# Patient Record
Sex: FEMALE | Race: WHITE | Employment: PART TIME | URBAN - METROPOLITAN AREA
[De-identification: names, ages, dates, MRNs, and addresses within clinical notes are randomized per-mention and may not be internally consistent; named-entity substitution may affect disease eponyms.]

---

## 2017-04-26 ENCOUNTER — HOSPITAL ENCOUNTER (OUTPATIENT)
Dept: MAMMOGRAPHY | Age: 62
Discharge: HOME | End: 2017-04-26
Attending: INTERNAL MEDICINE
Payer: MEDICARE

## 2017-04-26 DIAGNOSIS — Z12.31 VISIT FOR SCREENING MAMMOGRAM: ICD-10-CM

## 2017-04-26 PROCEDURE — 77067 SCR MAMMO BI INCL CAD: CPT

## 2018-05-09 ENCOUNTER — HOSPITAL ENCOUNTER (OUTPATIENT)
Dept: MAMMOGRAPHY | Age: 63
Discharge: HOME | End: 2018-05-09
Attending: INTERNAL MEDICINE
Payer: MEDICARE

## 2018-05-09 DIAGNOSIS — Z12.31 ENCOUNTER FOR SCREENING MAMMOGRAM FOR MALIGNANT NEOPLASM OF BREAST: ICD-10-CM

## 2018-05-09 PROCEDURE — 77067 SCR MAMMO BI INCL CAD: CPT

## 2018-08-15 ENCOUNTER — HOSPITAL ENCOUNTER (OUTPATIENT)
Dept: SURGERY | Age: 63
Discharge: HOME | End: 2018-08-15

## 2018-08-16 VITALS — BODY MASS INDEX: 24.25 KG/M2 | HEIGHT: 68 IN | WEIGHT: 160 LBS

## 2018-08-16 NOTE — PERIOP NOTES
Patient verified name, , and procedure. Type: 1a; abbreviated assessment per anesthesia guidelines  Labs per surgeon: none  Labs per anesthesia: poc glucose    Instructed pt that they will be notified via preop for time of arrival to GI lab. Follow diet and prep instructions per Dr Douglas Shetty instructions as follows: You will be on a clear liquid diet the day before your procedure and you may have any of the following clear liquids:   Water.  Strained fruit juices, without pulp, including apple, orange, white grape, or white cranberry.  Limeade or lemonade.  Coffee or tea (do not use any non-dairy creamer.)   Chicken broth.  Gelatin desserts, without added fruit or toppings (no red or purple gelatin.)  You should NOT:   Do NOT drink any milk products or use any milk products in coffee or tea.  Do NOT drink anything with red or purple dye.  Do NOT drink any alcoholic beverages. Bath or shower the night before and the am of surgery with non-moisturizing soap. No lotions, oils, powders, cologne on skin. No make up, eye make up or jewelry. Wear loose fitting comfortable, clean clothing. Must have adult present in building the entire time and MUST bring someone with you or arrange for someone to drive you home after the test.      You may take medications up to 3 hours prior to your procedure with sips of water only. Medications to take norco prn, synthroid, protonix, patient to hold (ASA). Pt will take 80% (24 units) of PM dose of Lantus night before colonoscopy per anesthesia protocols. Pt states she is a fragile diabetic - given direct number to preop and pt instructed to call AM DOS if any concerns re: glucose.     The following discharge instructions reviewed with patient: medication given during procedure may cause drowsiness for several hours, therefore, do not drive or operate machinery for remainder of the day, no alcohol on the day of your procedure, resume regular diet and activity unless otherwise directed, you may experience abdominal distention for several hours that is relieved by the passage of gas. Contact your physician if you have any of the following: fever or chills, severe abdominal pain or excessive amount of bleeding or a large amount when having a bowel movement.  Occasional specks of blood with bowel movement would not be unusual.

## 2018-08-20 ENCOUNTER — ANESTHESIA EVENT (OUTPATIENT)
Dept: ENDOSCOPY | Age: 63
End: 2018-08-20
Payer: MEDICARE

## 2018-08-20 RX ORDER — SODIUM CHLORIDE, SODIUM LACTATE, POTASSIUM CHLORIDE, CALCIUM CHLORIDE 600; 310; 30; 20 MG/100ML; MG/100ML; MG/100ML; MG/100ML
100 INJECTION, SOLUTION INTRAVENOUS CONTINUOUS
Status: CANCELLED | OUTPATIENT
Start: 2018-08-20

## 2018-08-20 RX ORDER — SODIUM CHLORIDE 0.9 % (FLUSH) 0.9 %
5-10 SYRINGE (ML) INJECTION AS NEEDED
Status: CANCELLED | OUTPATIENT
Start: 2018-08-20

## 2018-08-21 ENCOUNTER — HOSPITAL ENCOUNTER (OUTPATIENT)
Age: 63
Setting detail: OUTPATIENT SURGERY
Discharge: HOME | End: 2018-08-21
Attending: SURGERY | Admitting: SURGERY
Payer: MEDICARE

## 2018-08-21 ENCOUNTER — ANESTHESIA (OUTPATIENT)
Dept: ENDOSCOPY | Age: 63
End: 2018-08-21
Payer: MEDICARE

## 2018-08-21 VITALS
OXYGEN SATURATION: 98 % | HEART RATE: 72 BPM | BODY MASS INDEX: 24.24 KG/M2 | WEIGHT: 159.4 LBS | TEMPERATURE: 98.6 F | SYSTOLIC BLOOD PRESSURE: 133 MMHG | RESPIRATION RATE: 16 BRPM | DIASTOLIC BLOOD PRESSURE: 62 MMHG

## 2018-08-21 LAB
GLUCOSE BLD STRIP.AUTO-MCNC: 179 MG/DL (ref 65–100)
GLUCOSE BLD STRIP.AUTO-MCNC: 233 MG/DL (ref 65–100)

## 2018-08-21 PROCEDURE — 76040000025: Performed by: SURGERY

## 2018-08-21 PROCEDURE — 74011250636 HC RX REV CODE- 250/636

## 2018-08-21 PROCEDURE — 74011250636 HC RX REV CODE- 250/636: Performed by: ANESTHESIOLOGY

## 2018-08-21 PROCEDURE — 82962 GLUCOSE BLOOD TEST: CPT

## 2018-08-21 PROCEDURE — 76060000031 HC ANESTHESIA FIRST 0.5 HR: Performed by: SURGERY

## 2018-08-21 RX ORDER — SODIUM CHLORIDE, SODIUM LACTATE, POTASSIUM CHLORIDE, CALCIUM CHLORIDE 600; 310; 30; 20 MG/100ML; MG/100ML; MG/100ML; MG/100ML
100 INJECTION, SOLUTION INTRAVENOUS CONTINUOUS
Status: DISCONTINUED | OUTPATIENT
Start: 2018-08-21 | End: 2018-08-21 | Stop reason: HOSPADM

## 2018-08-21 RX ORDER — PROPOFOL 10 MG/ML
INJECTION, EMULSION INTRAVENOUS
Status: DISCONTINUED | OUTPATIENT
Start: 2018-08-21 | End: 2018-08-21 | Stop reason: HOSPADM

## 2018-08-21 RX ADMIN — PROPOFOL 160 MCG/KG/MIN: 10 INJECTION, EMULSION INTRAVENOUS at 07:35

## 2018-08-21 RX ADMIN — SODIUM CHLORIDE, SODIUM LACTATE, POTASSIUM CHLORIDE, AND CALCIUM CHLORIDE: 600; 310; 30; 20 INJECTION, SOLUTION INTRAVENOUS at 07:32

## 2018-08-21 NOTE — IP AVS SNAPSHOT
Blain Kehr 
 
 
 Formerly [facility], [facility] [address] 
[phone] Patient: Heather Jaffe MRN: [id number] About your hospitalization You were admitted on:  August 21, 2018 You last received care in the:  [facility] PREOP You were discharged on:  August 21, 2018 Why you were hospitalized Your primary diagnosis was:  Not on File Follow-up Information None Your Scheduled Appointments Tuesday August 21, 2018 COLONOSCOPY with Gabino Jerez, DO  
E ENDOSCOPY ([facility]) Formerly [facility], [facility] [address]  
[phone] Discharge Orders None A check adrienne indicates which time of day the medication should be taken. My Medications ASK your doctor about these medications Instructions Each Dose to Equal  
 Morning Noon Evening Bedtime  
 aliskiren 300 mg tablet Commonly known as:  [employer] Your last dose was: Your next dose is: Take 1 Tab by mouth daily. 300 mg  
    
   
   
   
  
 aspirin delayed-release 81 mg tablet Your last dose was: Your next dose is: Take 81 mg by mouth daily. 81 mg  
    
   
   
   
  
 B.infantis-B.ani-B.long-B.bifi 10-15 mg Tbec Commonly known as:  PROBIOTIC 4X Your last dose was: Your next dose is: Take one tablet daily Blood-Glucose Meter Misc Commonly known as:  RELION PRIME METER Your last dose was: Your next dose is:    
   
   
 DX E10.9  NPI [id number] Checks blood sugars seven times daily and prn CALCIUM 600 + D 600-125 mg-unit Tab Generic drug:  calcium-cholecalciferol (d3) Your last dose was: Your next dose is: Take  by mouth daily. flash glucose scanning reader Misc Your last dose was: Your next dose is:    
   
   
 Replace sensor once every ten days Dx E 10.9, E 10.42  
     
   
   
   
  
 glucose blood VI test strips strip Commonly known as:  RELION PRIME TEST STRIPS Your last dose was: Your next dose is:    
   
   
 Checks blood sugars seven times per day and prn. DX E10.9 NPI [id number]  
     
   
   
   
  
 * HYDROcodone-acetaminophen  mg tablet Commonly known as:  Birda [place] Your last dose was: Your next dose is: Take 1 Tab by mouth every six (6) hours as needed for Pain. Max Daily Amount: 4 Tabs. 1 Tab  
    
   
   
   
  
 * HYDROcodone-acetaminophen  mg tablet Commonly known as:  Birda [place] Start taking on:  8/31/2018 Your last dose was: Your next dose is: Take 1 Tab by mouth every six (6) hours as needed. Max Daily Amount: 4 Tabs. 1 Tab  
    
   
   
   
  
 * HYDROcodone-acetaminophen  mg tablet Commonly known as:  Birda [place] Start taking on:  9/30/2018 Your last dose was: Your next dose is: Take 1 Tab by mouth every six (6) hours as needed for Pain. Max Daily Amount: 4 Tabs. 1 Tab  
    
   
   
   
  
 ibuprofen 400 mg tablet Commonly known as:  MOTRIN Your last dose was: Your next dose is: Take 1 Tab by mouth every six (6) hours as needed for Pain. 400 mg  
    
   
   
   
  
 * Insulin Needles (Disposable) 29 gauge x 1/2\" Ndle Commonly known as:  BD ULTRA-FINE ORIG PEN NEEDLE Your last dose was: Your next dose is:    
   
   
 1 Units by Does Not Apply route nightly. Injects q hs  
 1 Units * Insulin Needles (Disposable) 30 gauge x 1/3\" Commonly known as:  NOVOFINE 30 Your last dose was: Your next dose is:    
   
   
 E10.9 NPI [id number] Inject QID * Insulin Needles (Disposable) 29 gauge x 1/2\" Ndle Your last dose was: Your next dose is:    
   
   
 Injects once daily  DX E10.9  [id number]  
     
   
   
   
  
 lancets 23 gauge Misc Commonly known as:  ACCU-CHEK SAFE-T-PRO PLUS Your last dose was: Your next dose is:    
   
   
 1 Lancet by Does Not Apply route Before meals, after meals and at bedtime. Checks BS seven times daily   DX E10.9  NPI [id number] 1 Lancet LANTUS SOLOSTAR U-100 INSULIN 100 unit/mL (3 mL) Inpn Generic drug:  insulin glargine Your last dose was: Your next dose is:    
   
   
 30 Units by SubCUTAneous route nightly. 30 Units  
    
   
   
   
  
 levothyroxine 100 mcg tablet Commonly known as:  SYNTHROID Your last dose was: Your next dose is: Take 1 Tab by mouth Daily (before breakfast). 100 mcg NovoLOG Flexpen U-100 Insulin 100 unit/mL Inpn Generic drug:  insulin aspart U-100 Your last dose was: Your next dose is:    
   
   
 8 Units by SubCUTAneous route Before breakfast, lunch, and dinner. Sliding scale 8 Units  
    
   
   
   
  
 nystatin powder Commonly known as:  NYSTOP Your last dose was: Your next dose is:    
   
   
 Apply  to affected area four (4) times daily. pantoprazole 40 mg tablet Commonly known as:  PROTONIX Your last dose was: Your next dose is: Take 1 Tab by mouth daily. 40 mg  
    
   
   
   
  
 sodium-potassium-mag sulfate 17.5-3.13-1.6 gram Solr oral solution Commonly known as:  SUPREP BOWEL PREP KIT Your last dose was: Your next dose is: Take 177 mL by mouth See Admin Instructions. 177 mL * Notice: This list has 6 medication(s) that are the same as other medications prescribed for you. Read the directions carefully, and ask your doctor or other care provider to review them with you. Opioid Education Prescription Opioids: What You Need to Know: 
 
Prescription opioids can be used to help relieve moderate-to-severe pain and are often prescribed following a surgery or injury, or for certain health conditions. These medications can be an important part of treatment but also come with serious risks. Opioids are strong pain medicines. Examples include hydrocodone, oxycodone, fentanyl, and morphine. Heroin is an example of an illegal opioid. It is important to work with your health care provider to make sure you are getting the safest, most effective care. WHAT ARE THE RISKS AND SIDE EFFECTS OF OPIOID USE? Prescription opioids carry serious risks of addiction and overdose, especially with prolonged use. An opioid overdose, often marked by slow breathing, can cause sudden death. The use of prescription opioids can have a number of side effects as well, even when taken as directed. · Tolerance-meaning you might need to take more of a medication for the same pain relief · Physical dependence-meaning you have symptoms of withdrawal when the medication is stopped. Withdrawal symptoms can include nausea, sweating, chills, diarrhea, stomach cramps, and muscle aches. Withdrawal can last up to several weeks, depending on which drug you took and how long you took it. · Increased sensitivity to pain · Constipation · Nausea, vomiting, and dry mouth · Sleepiness and dizziness · Confusion · Depression · Low levels of testosterone that can result in lower sex drive, energy, and strength · Itching and sweating RISKS ARE GREATER WITH:      
· History of drug misuse, substance use disorder, or overdose · Mental health conditions (such as depression or anxiety) · Sleep apnea · Older age (72 years or older) · Pregnancy Avoid alcohol while taking prescription opioids. Also, unless specifically advised by your health care provider, medications to avoid include: · Benzodiazepines (such as Xanax or Valium) · Muscle relaxants (such as Soma or Flexeril) · Hypnotics (such as Ambien or Lunesta) · Other prescription opioids KNOW YOUR OPTIONS Talk to your health care provider about ways to manage your pain that don't involve prescription opioids. Some of these options may actually work better and have fewer risks and side effects. Options may include: 
· Pain relievers such as acetaminophen, ibuprofen, and naproxen · Some medications that are also used for depression or seizures · Physical therapy and exercise · Counseling to help patients learn how to cope better with triggers of pain and stress. · Application of heat or cold compress · Massage therapy · Relaxation techniques Be Informed Make sure you know the name of your medication, how much and how often to take it, and its potential risks & side effects. IF YOU ARE PRESCRIBED OPIOIDS FOR PAIN: 
· Never take opioids in greater amounts or more often than prescribed. Remember the goal is not to be pain-free but to manage your pain at a tolerable level. · Follow up with your primary care provider to: · Work together to create a plan on how to manage your pain. · Talk about ways to help manage your pain that don't involve prescription opioids. · Talk about any and all concerns and side effects. · Help prevent misuse and abuse. · Never sell or share prescription opioids · Help prevent misuse and abuse. · Store prescription opioids in a secure place and out of reach of others (this may include visitors, children, friends, and family). · Safely dispose of unused/unwanted prescription opioids: Find your community drug take-back program or your pharmacy mail-back program, or flush them down the toilet, following guidance from the Food and Drug Administration (www.fda.gov/Drugs/ResourcesForYou). · Visit www.cdc.gov/drugoverdose to learn about the risks of opioid abuse and overdose. · If you believe you may be struggling with addiction, tell your health care provider and ask for guidance or call Daphne Flanagan at 8-197-378-HELP. Discharge Instructions Gastrointestinal Colonoscopy/Flexible Sigmoidoscopy - Lower Exam Discharge Instructions 1. Call Dr. Shakeel Qureshi at office for any problems or questions. 2. Contact the doctors office for follow up appointment as directed 3. Medication may cause drowsiness for several hours, therefore, do not drive or operate machinery for remainder of the day. 4. No alcohol today. 5. Ordinarily, you may resume regular diet and activity after exam unless otherwise specified by your physician. 6. Because of air put into your colon during exam, you may experience some abdominal distension, relieved by the passage of gas, for several hours. 7. Contact your physician if you have any of the following: 
a. Excessive amount of bleeding  large amount when having a bowel movement. Occasional specks of blood with bowel movement would not be unusual. 
b. Severe abdominal pain 
c. Fever or Chills 8. Polyp Removal  follow these additional instructions 
a. Clear liquid diet for the next meal (jell-o, broth, clear drinks) b. Soft diet for 24 hours, then resume regular diet  
c. Take Metamucil  1 tablespoon in juice every morning for 3 days 
d. No Aspirin, Advil, Aleve, Nuprin, Ibuprofen, or medications that contain these drugs for 2 weeks. Instructions given to Laure Magana and other family members. ACO Transitions of Care Introducing [employer] [address] Jose offers a voluntary care coordination program to provide high quality service and care to [facility] fee-for-service beneficiaries. Miguel Caldera was designed to help you enhance your health and well-being through the following services: ? Transitions of Care  support for individuals who are transitioning from one care setting to another (example: Hospital to home). ? Chronic and Complex Care Coordination  support for individuals and caregivers of those with serious or chronic illnesses or with more than one chronic (ongoing) condition and those who take a number of different medications. If you meet specific medical criteria, a [facility] may call you directly to coordinate your care with your primary care physician and your other care providers. For questions about the [facility] programs, please, contact your physicians office. For general questions or additional information about Accountable Care Organizations: 
Please visit www.medicare.gov/acos. html or call 1-800-MEDICARE (8-155.574.7558) TTY users should call 4-338.239.4425. Introducing \[facility] [place] [facility]\"" & HEALTH SERVICES! Dear Meenakshi Dominguez: Thank you for requesting a [employer] account. Our records indicate that you already have an active [employer] account. You can access your account anytime at [email or web address] Did you know that you can access your hospital and ER discharge instructions at any time in [employer]? You can also review all of your test results from your hospital stay or ER visit. Additional Information If you have questions, please visit the Frequently Asked Questions section of the [employer] website at [email or web address] Remember, [employer] is NOT to be used for urgent needs. For medical emergencies, dial 911. Now available from your iPhone and Android! Introducing Tristan Dennis As a [employer] patient, I wanted to make you aware of our electronic visit tool called Tristan Dennis. [employer] 24/7 allows you to connect within minutes with a medical provider 24 hours a day, seven days a week via a mobile device or tablet or logging into a secure website from your computer. You can access zoomsquare from anywhere in the [place]. A virtual visit might be right for you when you have a simple condition and feel like you just dont want to get out of bed, or cant get away from work for an appointment, when your regular [address] [place] [address] provider is not available (evenings, weekends or holidays), or when youre out of town and need minor care. Electronic visits cost only $49 and if the [address] [place] [address] 24/7 provider determines a prescription is needed to treat your condition, one can be electronically transmitted to a nearby pharmacy*. Please take a moment to enroll today if you have not already done so. The enrollment process is free and takes just a few minutes. To enroll, please download the [employer] [facility] 24/7 kimberly to your tablet or phone, or visit [email or web address] to enroll on your computer. And, as an [address] [place] [address] patient with a [employer] account, the results of your visits will be scanned into your electronic medical record and your primary care provider will be able to view the scanned results. We urge you to continue to see your regular [address] [place] [address] provider for your ongoing medical care. And while your primary care provider may not be the one available when you seek a [employer] virtual visit, the peace of mind you get from getting a real diagnosis real time can be priceless. For more information on [employer]quare, view our Frequently Asked Questions (FAQs) at [email or web address]. Sincerely, 
 
Mer Kolb MD 
Chief Medical Officer [employer] [facility] Garcia *:  certain medications cannot be prescribed via [employer] Providers Seen During Your Hospitalization Provider Specialty Primary office phone Trino Russell, [address] [facility] [phone] Your Primary Care Physician (PCP) Primary Care Physician Office Phone Office Fax Casey Palma [phone] You are allergic to the following Allergen Reactions Buspar (Buspirone) Palpitations Myrbetriq (Mirabegron) Hives Recent Documentation OB Status Smoking Status Hysterectomy Never Smoker Emergency Contacts Name Discharge Info Relation Home Work Mobile Unique Manning DISCHARGE CAREGIVER [3] Other Relative [6] [phone] Patient Belongings The following personal items are in your possession at time of discharge: 
                             
 
  
  
 Please provide this summary of care documentation to your next provider. Signatures-by signing, you are acknowledging that this After Visit Summary has been reviewed with you and you have received a copy. Patient Signature:  ____________________________________________________________ Date:  ____________________________________________________________  
  
[facility] Provider Signature:  ____________________________________________________________ Date:  ____________________________________________________________

## 2018-08-21 NOTE — ANESTHESIA PREPROCEDURE EVALUATION
Anesthetic History   No history of anesthetic complications            Review of Systems / Medical History  Patient summary reviewed and pertinent labs reviewed    Pulmonary  Within defined limits                 Neuro/Psych   Within defined limits           Cardiovascular    Hypertension: well controlled              Exercise tolerance: >4 METS  Comments: Occasional PAC's. Denies CP, SOB, Syncope, or changes in functional status   GI/Hepatic/Renal     GERD           Endo/Other    Diabetes (Brittle IDDM.  Glucose 189 - prone to drop fast per Pt): well controlled, type 1    Cancer (Breast)     Other Findings   Comments: Gastroparesis    Neuropathy           Physical Exam    Airway  Mallampati: II  TM Distance: 4 - 6 cm  Neck ROM: normal range of motion   Mouth opening: Normal     Cardiovascular    Rhythm: regular  Rate: normal         Dental  No notable dental hx       Pulmonary  Breath sounds clear to auscultation               Abdominal  GI exam deferred       Other Findings            Anesthetic Plan    ASA: 3  Anesthesia type: total IV anesthesia          Induction: Intravenous  Anesthetic plan and risks discussed with: Patient

## 2018-08-21 NOTE — DISCHARGE INSTRUCTIONS
Gastrointestinal Colonoscopy/Flexible Sigmoidoscopy - Lower Exam Discharge Instructions  1. Call Dr. Liset Vanessa at office for any problems or questions. 2. Contact the doctors office for follow up appointment as directed  3. Medication may cause drowsiness for several hours, therefore, do not drive or operate machinery for remainder of the day. 4. No alcohol today. 5. Ordinarily, you may resume regular diet and activity after exam unless otherwise specified by your physician. 6. Because of air put into your colon during exam, you may experience some abdominal distension, relieved by the passage of gas, for several hours. 7. Contact your physician if you have any of the following:  a. Excessive amount of bleeding - large amount when having a bowel movement. Occasional specks of blood with bowel movement would not be unusual.  b. Severe abdominal pain  c. Fever or Chills  8. Polyp Removal - follow these additional instructions  a. Clear liquid diet for the next meal (jell-o, broth, clear drinks)  b. Soft diet for 24 hours, then resume regular diet   c. Take Metamucil - 1 tablespoon in juice every morning for 3 days  d. No Aspirin, Advil, Aleve, Nuprin, Ibuprofen, or medications that contain these drugs for 2 weeks. Instructions given to Nhung Alcocerner and other family members.

## 2018-08-21 NOTE — PROCEDURES
Procedure in Detail:  Informed consent was obtained for the procedure. The patient was placed in the left lateral decubitus position and sedation was induced by anesthesia. The [id number] was inserted into the rectum and advanced under direct vision to the cecum, which was identified by the ileocecal valve and appendiceal orifice. The quality of the colonic preparation was excellent. A careful inspection was made as the colonoscope was withdrawn, including a retroflexed view of the rectum; findings and interventions are described below. Appropriate photodocumentation was obtained. Findings:   ANUS: Anal exam reveals no masses or hemorrhoids, sphincter tone is normal.   RECTUM: Rectal exam reveals no masses or hemorrhoids. SIGMOID COLON: The mucosa is normal with good vascular pattern and without ulcers, diverticula, and polyps. DESCENDING COLON: The mucosa is normal with good vascular pattern and without ulcers, diverticula, and polyps. SPLENIC FLEXURE: The splenic flexure is normal.   TRANSVERSE COLON: The mucosa is normal with good vascular pattern and without ulcers, diverticula, and polyps. HEPATIC FLEXURE: The hepatic flexure is normal.   ASCENDING COLON: The mucosa is normal with good vascular pattern and without ulcers, diverticula, and polyps. CECUM: The appendiceal orifice appears normal. The ileocecal valve appears normal.   TERMINAL ILEUM: The terminal ileum was not entered. Specimens: No specimens were collected. Complications: None; patient tolerated the procedure well. \    EBL - none    Recommendations:   - Repeat colonoscopy in 5 years.      Signed By: Maria Victoria Carr DO                        August 21, 2018

## 2018-08-21 NOTE — ANESTHESIA POSTPROCEDURE EVALUATION
Post-Anesthesia Evaluation and Assessment    Patient: Flavia Houser MRN: [id number]  SSN: [id number]    YOB: 1955  Age: 60 y.o. Sex: female       Cardiovascular Function/Vital Signs  Visit Vitals    /62    Pulse 72    Temp 37 °C (98.6 °F)    Resp 16    Wt 72.3 kg (159 lb 6.4 oz)    SpO2 98%    BMI 24.24 kg/m2       Patient is status post total IV anesthesia anesthesia for Procedure(s):  COLONOSCOPY  BMI 25. Nausea/Vomiting: None    Postoperative hydration reviewed and adequate. Pain:  Pain Scale 1: Visual (08/21/18 0808)  Pain Intensity 1: 0 (08/21/18 7823)   Managed    Neurological Status: At baseline    Mental Status and Level of Consciousness: Alert and oriented     Pulmonary Status:   O2 Device: Room air (08/21/18 0813)   Adequate oxygenation and airway patent    Complications related to anesthesia: None    Post-anesthesia assessment completed.  No concerns    Signed By: Irineo Jackson MD     August 21, 2018

## 2018-08-22 LAB — GLUCOSE BLD STRIP.AUTO-MCNC: 175 MG/DL (ref 65–100)

## 2019-05-16 ENCOUNTER — HOSPITAL ENCOUNTER (OUTPATIENT)
Dept: MAMMOGRAPHY | Age: 64
Discharge: HOME | End: 2019-05-16
Attending: INTERNAL MEDICINE
Payer: MEDICARE

## 2019-05-16 DIAGNOSIS — Z12.31 VISIT FOR SCREENING MAMMOGRAM: ICD-10-CM

## 2019-05-16 PROCEDURE — 77063 BREAST TOMOSYNTHESIS BI: CPT

## 2019-08-02 PROBLEM — E13.319 RETINOPATHY DUE TO SECONDARY DIABETES (HCC): Status: ACTIVE | Noted: 2019-08-02

## 2019-08-02 PROBLEM — Z86.39 HISTORY OF GRAVES' DISEASE: Status: ACTIVE | Noted: 2019-08-02

## 2019-09-24 ENCOUNTER — TELEPHONE (OUTPATIENT)
Dept: DIABETES SERVICES | Age: 64
End: 2019-09-24

## 2019-09-24 NOTE — TELEPHONE ENCOUNTER
Called and left message to schedule Dexcom G6  Continuous Glucose Monitor start. Provided phone number for call back.

## 2019-09-24 NOTE — TELEPHONE ENCOUNTER
Patient called back and wants appointment mid October per patient request for Dexcom G6 Continuous Glucose Monitor start.

## 2019-10-14 ENCOUNTER — HOSPITAL ENCOUNTER (OUTPATIENT)
Dept: DIABETES SERVICES | Age: 64
Discharge: HOME | End: 2019-10-14
Attending: PHYSICIAN ASSISTANT
Payer: MEDICARE

## 2019-10-14 DIAGNOSIS — E10.40 TYPE 1 DIABETES MELLITUS WITH DIABETIC NEUROPATHY (HCC): ICD-10-CM

## 2019-10-14 PROCEDURE — 95249 CONT GLUC MNTR PT PROV EQP: CPT

## 2019-10-14 NOTE — PROGRESS NOTES
Pt seen today for initial insertion of Dexcom G6 system. Pt instructed on overview of continuous glucose monitoring (CGM) device and use. Instructed on sensor, transmitter and .  used. Instructed in charging procedures. Time and date and transmitter ID verified. Instructed on troubleshooting, alerts, alarms, calibration, communication between sensor and , insertion of sensor and transmitter, starting sensor session, start up calibration, ending sensor session, removing sensor pod and transmitter and site rotation. Reviewed CGM guidelines and restrictions on use including no MRI, CT scans, bug spray and sun tanning lotions. All questions and concerns addressed. Provided Dexcom technical support phone number.

## 2020-01-13 ENCOUNTER — HOME HEALTH ADMISSION (OUTPATIENT)
Dept: HOME HEALTH SERVICES | Facility: HOME HEALTH | Age: 65
End: 2020-01-13
Payer: MEDICARE

## 2020-01-13 PROBLEM — E10.42 DIABETIC POLYNEUROPATHY ASSOCIATED WITH TYPE 1 DIABETES MELLITUS (HCC): Status: ACTIVE | Noted: 2020-01-13

## 2020-01-15 ENCOUNTER — HOME CARE VISIT (OUTPATIENT)
Dept: SCHEDULING | Facility: HOME HEALTH | Age: 65
End: 2020-01-15
Payer: MEDICARE

## 2020-01-15 VITALS
HEART RATE: 66 BPM | RESPIRATION RATE: 18 BRPM | TEMPERATURE: 98 F | DIASTOLIC BLOOD PRESSURE: 86 MMHG | SYSTOLIC BLOOD PRESSURE: 162 MMHG

## 2020-01-15 PROCEDURE — 400013 HH SOC

## 2020-01-15 PROCEDURE — 3331090002 HH PPS REVENUE DEBIT

## 2020-01-15 PROCEDURE — G0151 HHCP-SERV OF PT,EA 15 MIN: HCPCS

## 2020-01-15 PROCEDURE — 3331090001 HH PPS REVENUE CREDIT

## 2020-01-16 PROCEDURE — 3331090001 HH PPS REVENUE CREDIT

## 2020-01-16 PROCEDURE — 3331090002 HH PPS REVENUE DEBIT

## 2020-01-17 PROCEDURE — 3331090001 HH PPS REVENUE CREDIT

## 2020-01-17 PROCEDURE — 3331090002 HH PPS REVENUE DEBIT

## 2020-01-18 PROCEDURE — 3331090001 HH PPS REVENUE CREDIT

## 2020-01-18 PROCEDURE — 3331090002 HH PPS REVENUE DEBIT

## 2020-01-19 PROCEDURE — 3331090001 HH PPS REVENUE CREDIT

## 2020-01-19 PROCEDURE — 3331090002 HH PPS REVENUE DEBIT

## 2020-01-20 ENCOUNTER — HOME CARE VISIT (OUTPATIENT)
Dept: HOME HEALTH SERVICES | Facility: HOME HEALTH | Age: 65
End: 2020-01-20
Payer: MEDICARE

## 2020-01-20 ENCOUNTER — HOME CARE VISIT (OUTPATIENT)
Dept: SCHEDULING | Facility: HOME HEALTH | Age: 65
End: 2020-01-20
Payer: MEDICARE

## 2020-01-20 VITALS
SYSTOLIC BLOOD PRESSURE: 120 MMHG | HEART RATE: 72 BPM | TEMPERATURE: 98.1 F | DIASTOLIC BLOOD PRESSURE: 78 MMHG | RESPIRATION RATE: 17 BRPM

## 2020-01-20 PROCEDURE — G0157 HHC PT ASSISTANT EA 15: HCPCS

## 2020-01-20 PROCEDURE — 3331090001 HH PPS REVENUE CREDIT

## 2020-01-20 PROCEDURE — 3331090002 HH PPS REVENUE DEBIT

## 2020-01-21 PROCEDURE — 3331090001 HH PPS REVENUE CREDIT

## 2020-01-21 PROCEDURE — 3331090002 HH PPS REVENUE DEBIT

## 2020-01-22 ENCOUNTER — HOME CARE VISIT (OUTPATIENT)
Dept: SCHEDULING | Facility: HOME HEALTH | Age: 65
End: 2020-01-22
Payer: MEDICARE

## 2020-01-22 PROCEDURE — 3331090002 HH PPS REVENUE DEBIT

## 2020-01-22 PROCEDURE — G0155 HHCP-SVS OF CSW,EA 15 MIN: HCPCS

## 2020-01-22 PROCEDURE — 3331090001 HH PPS REVENUE CREDIT

## 2020-01-23 ENCOUNTER — HOME CARE VISIT (OUTPATIENT)
Dept: SCHEDULING | Facility: HOME HEALTH | Age: 65
End: 2020-01-23
Payer: MEDICARE

## 2020-01-23 VITALS
RESPIRATION RATE: 20 BRPM | SYSTOLIC BLOOD PRESSURE: 112 MMHG | HEART RATE: 68 BPM | DIASTOLIC BLOOD PRESSURE: 68 MMHG | TEMPERATURE: 98.4 F

## 2020-01-23 PROCEDURE — G0157 HHC PT ASSISTANT EA 15: HCPCS

## 2020-01-23 PROCEDURE — 3331090001 HH PPS REVENUE CREDIT

## 2020-01-23 PROCEDURE — 3331090002 HH PPS REVENUE DEBIT

## 2020-01-24 PROCEDURE — 3331090002 HH PPS REVENUE DEBIT

## 2020-01-24 PROCEDURE — 3331090001 HH PPS REVENUE CREDIT

## 2020-01-25 PROCEDURE — 3331090002 HH PPS REVENUE DEBIT

## 2020-01-25 PROCEDURE — 3331090001 HH PPS REVENUE CREDIT

## 2020-01-26 PROCEDURE — 3331090001 HH PPS REVENUE CREDIT

## 2020-01-26 PROCEDURE — 3331090002 HH PPS REVENUE DEBIT

## 2020-01-27 ENCOUNTER — HOME CARE VISIT (OUTPATIENT)
Dept: SCHEDULING | Facility: HOME HEALTH | Age: 65
End: 2020-01-27
Payer: MEDICARE

## 2020-01-27 VITALS
DIASTOLIC BLOOD PRESSURE: 66 MMHG | TEMPERATURE: 97.3 F | HEART RATE: 72 BPM | SYSTOLIC BLOOD PRESSURE: 144 MMHG | RESPIRATION RATE: 20 BRPM

## 2020-01-27 PROCEDURE — 3331090002 HH PPS REVENUE DEBIT

## 2020-01-27 PROCEDURE — G0157 HHC PT ASSISTANT EA 15: HCPCS

## 2020-01-27 PROCEDURE — 3331090001 HH PPS REVENUE CREDIT

## 2020-01-28 PROCEDURE — 3331090002 HH PPS REVENUE DEBIT

## 2020-01-28 PROCEDURE — 3331090001 HH PPS REVENUE CREDIT

## 2020-01-29 PROCEDURE — 3331090002 HH PPS REVENUE DEBIT

## 2020-01-29 PROCEDURE — 3331090001 HH PPS REVENUE CREDIT

## 2020-01-30 ENCOUNTER — HOME CARE VISIT (OUTPATIENT)
Dept: SCHEDULING | Facility: HOME HEALTH | Age: 65
End: 2020-01-30
Payer: MEDICARE

## 2020-01-30 VITALS
SYSTOLIC BLOOD PRESSURE: 130 MMHG | HEART RATE: 88 BPM | TEMPERATURE: 98.4 F | DIASTOLIC BLOOD PRESSURE: 60 MMHG | RESPIRATION RATE: 19 BRPM

## 2020-01-30 PROCEDURE — 3331090001 HH PPS REVENUE CREDIT

## 2020-01-30 PROCEDURE — 3331090002 HH PPS REVENUE DEBIT

## 2020-01-30 PROCEDURE — G0157 HHC PT ASSISTANT EA 15: HCPCS

## 2020-01-31 PROCEDURE — 3331090001 HH PPS REVENUE CREDIT

## 2020-01-31 PROCEDURE — 3331090002 HH PPS REVENUE DEBIT

## 2020-02-01 PROCEDURE — 3331090001 HH PPS REVENUE CREDIT

## 2020-02-01 PROCEDURE — 3331090002 HH PPS REVENUE DEBIT

## 2020-02-02 PROCEDURE — 3331090001 HH PPS REVENUE CREDIT

## 2020-02-02 PROCEDURE — 3331090002 HH PPS REVENUE DEBIT

## 2020-02-03 ENCOUNTER — HOME CARE VISIT (OUTPATIENT)
Dept: SCHEDULING | Facility: HOME HEALTH | Age: 65
End: 2020-02-03
Payer: MEDICARE

## 2020-02-03 VITALS
TEMPERATURE: 98.1 F | HEART RATE: 80 BPM | RESPIRATION RATE: 19 BRPM | DIASTOLIC BLOOD PRESSURE: 60 MMHG | SYSTOLIC BLOOD PRESSURE: 142 MMHG

## 2020-02-03 PROCEDURE — G0157 HHC PT ASSISTANT EA 15: HCPCS

## 2020-02-03 PROCEDURE — 3331090002 HH PPS REVENUE DEBIT

## 2020-02-03 PROCEDURE — 3331090001 HH PPS REVENUE CREDIT

## 2020-02-04 ENCOUNTER — HOME CARE VISIT (OUTPATIENT)
Dept: HOME HEALTH SERVICES | Facility: HOME HEALTH | Age: 65
End: 2020-02-04
Payer: MEDICARE

## 2020-02-04 PROCEDURE — 3331090002 HH PPS REVENUE DEBIT

## 2020-02-04 PROCEDURE — 3331090001 HH PPS REVENUE CREDIT

## 2020-02-05 PROCEDURE — 3331090001 HH PPS REVENUE CREDIT

## 2020-02-05 PROCEDURE — 3331090002 HH PPS REVENUE DEBIT

## 2020-02-06 PROCEDURE — 3331090001 HH PPS REVENUE CREDIT

## 2020-02-06 PROCEDURE — 3331090002 HH PPS REVENUE DEBIT

## 2020-02-07 ENCOUNTER — HOME CARE VISIT (OUTPATIENT)
Dept: SCHEDULING | Facility: HOME HEALTH | Age: 65
End: 2020-02-07
Payer: MEDICARE

## 2020-02-07 VITALS
TEMPERATURE: 98 F | DIASTOLIC BLOOD PRESSURE: 52 MMHG | HEART RATE: 84 BPM | RESPIRATION RATE: 18 BRPM | SYSTOLIC BLOOD PRESSURE: 108 MMHG

## 2020-02-07 PROCEDURE — 3331090002 HH PPS REVENUE DEBIT

## 2020-02-07 PROCEDURE — G0157 HHC PT ASSISTANT EA 15: HCPCS

## 2020-02-07 PROCEDURE — 3331090001 HH PPS REVENUE CREDIT

## 2020-02-08 PROCEDURE — 3331090002 HH PPS REVENUE DEBIT

## 2020-02-08 PROCEDURE — 3331090001 HH PPS REVENUE CREDIT

## 2020-02-09 PROCEDURE — 3331090001 HH PPS REVENUE CREDIT

## 2020-02-09 PROCEDURE — 3331090002 HH PPS REVENUE DEBIT

## 2020-02-10 ENCOUNTER — HOME CARE VISIT (OUTPATIENT)
Dept: SCHEDULING | Facility: HOME HEALTH | Age: 65
End: 2020-02-10
Payer: MEDICARE

## 2020-02-10 VITALS
DIASTOLIC BLOOD PRESSURE: 68 MMHG | HEART RATE: 78 BPM | RESPIRATION RATE: 18 BRPM | SYSTOLIC BLOOD PRESSURE: 110 MMHG | TEMPERATURE: 97.8 F

## 2020-02-10 PROCEDURE — 3331090001 HH PPS REVENUE CREDIT

## 2020-02-10 PROCEDURE — 3331090002 HH PPS REVENUE DEBIT

## 2020-02-10 PROCEDURE — 3331090003 HH PPS REVENUE ADJ

## 2020-02-10 PROCEDURE — G0151 HHCP-SERV OF PT,EA 15 MIN: HCPCS

## 2020-07-14 ENCOUNTER — HOSPITAL ENCOUNTER (OUTPATIENT)
Dept: MAMMOGRAPHY | Age: 65
Discharge: HOME | End: 2020-07-14
Attending: INTERNAL MEDICINE
Payer: MEDICARE

## 2020-07-14 DIAGNOSIS — Z12.31 SCREENING MAMMOGRAM, ENCOUNTER FOR: ICD-10-CM

## 2020-07-14 PROCEDURE — 77063 BREAST TOMOSYNTHESIS BI: CPT

## 2020-10-07 PROBLEM — E11.21 TYPE 2 DIABETES WITH NEPHROPATHY (HCC): Status: ACTIVE | Noted: 2020-10-07

## 2021-05-24 ENCOUNTER — TRANSCRIBE ORDER (OUTPATIENT)
Dept: SCHEDULING | Age: 66
End: 2021-05-24

## 2021-05-24 DIAGNOSIS — Z12.31 VISIT FOR SCREENING MAMMOGRAM: Primary | ICD-10-CM

## 2021-07-15 ENCOUNTER — HOSPITAL ENCOUNTER (OUTPATIENT)
Dept: MAMMOGRAPHY | Age: 66
Discharge: HOME | End: 2021-07-15
Attending: INTERNAL MEDICINE
Payer: MEDICARE

## 2021-07-15 DIAGNOSIS — Z12.31 VISIT FOR SCREENING MAMMOGRAM: ICD-10-CM

## 2021-07-15 PROCEDURE — 77063 BREAST TOMOSYNTHESIS BI: CPT

## 2022-03-01 ENCOUNTER — TRANSCRIBE ORDER (OUTPATIENT)
Dept: SCHEDULING | Age: 67
End: 2022-03-01

## 2022-03-01 DIAGNOSIS — Z12.31 VISIT FOR SCREENING MAMMOGRAM: Primary | ICD-10-CM

## 2022-03-01 DIAGNOSIS — Z12.31 SCREENING MAMMOGRAM FOR HIGH-RISK PATIENT: Primary | ICD-10-CM

## 2022-03-18 PROBLEM — E13.319 RETINOPATHY DUE TO SECONDARY DIABETES (HCC): Status: ACTIVE | Noted: 2019-08-02

## 2022-03-19 PROBLEM — E11.21 TYPE 2 DIABETES WITH NEPHROPATHY (HCC): Status: ACTIVE | Noted: 2020-10-07

## 2022-03-19 PROBLEM — Z86.39 HISTORY OF GRAVES' DISEASE: Status: ACTIVE | Noted: 2019-08-02

## 2022-03-20 PROBLEM — E10.42 DIABETIC POLYNEUROPATHY ASSOCIATED WITH TYPE 1 DIABETES MELLITUS (HCC): Status: ACTIVE | Noted: 2020-01-13

## 2022-06-22 ENCOUNTER — NURSE ONLY (OUTPATIENT)
Dept: INTERNAL MEDICINE CLINIC | Facility: CLINIC | Age: 67
End: 2022-06-22

## 2022-06-22 DIAGNOSIS — I10 ESSENTIAL HYPERTENSION: Primary | ICD-10-CM

## 2022-06-22 DIAGNOSIS — E11.21 TYPE 2 DIABETES WITH NEPHROPATHY (HCC): ICD-10-CM

## 2022-06-22 DIAGNOSIS — K21.9 GASTROESOPHAGEAL REFLUX DISEASE WITHOUT ESOPHAGITIS: ICD-10-CM

## 2022-06-24 LAB
ALBUMIN SERPL-MCNC: 3.5 G/DL (ref 3.2–4.6)
ALBUMIN/GLOB SERPL: 0.9 {RATIO} (ref 1.2–3.5)
ALP SERPL-CCNC: 82 U/L (ref 50–136)
ALT SERPL-CCNC: 21 U/L (ref 12–65)
ANION GAP SERPL CALC-SCNC: 9 MMOL/L (ref 7–16)
AST SERPL-CCNC: 17 U/L (ref 15–37)
BASOPHILS # BLD: 0.1 K/UL (ref 0–0.2)
BASOPHILS NFR BLD: 1 % (ref 0–2)
BILIRUB SERPL-MCNC: 0.2 MG/DL (ref 0.2–1.1)
BUN SERPL-MCNC: 15 MG/DL (ref 8–23)
CALCIUM SERPL-MCNC: 9.4 MG/DL (ref 8.3–10.4)
CHLORIDE SERPL-SCNC: 107 MMOL/L (ref 98–107)
CO2 SERPL-SCNC: 25 MMOL/L (ref 21–32)
CREAT SERPL-MCNC: 1.2 MG/DL (ref 0.6–1)
DIFFERENTIAL METHOD BLD: ABNORMAL
EOSINOPHIL # BLD: 0.2 K/UL (ref 0–0.8)
EOSINOPHIL NFR BLD: 3 % (ref 0.5–7.8)
ERYTHROCYTE [DISTWIDTH] IN BLOOD BY AUTOMATED COUNT: 14.7 % (ref 11.9–14.6)
GLOBULIN SER CALC-MCNC: 3.8 G/DL (ref 2.3–3.5)
GLUCOSE SERPL-MCNC: 92 MG/DL (ref 65–100)
HCT VFR BLD AUTO: 50.5 % (ref 35.8–46.3)
HGB BLD-MCNC: 14.6 G/DL (ref 11.7–15.4)
IMM GRANULOCYTES # BLD AUTO: 0 K/UL (ref 0–0.5)
IMM GRANULOCYTES NFR BLD AUTO: 0 % (ref 0–5)
LYMPHOCYTES # BLD: 3.9 K/UL (ref 0.5–4.6)
LYMPHOCYTES NFR BLD: 46 % (ref 13–44)
MCH RBC QN AUTO: 30.8 PG (ref 26.1–32.9)
MCHC RBC AUTO-ENTMCNC: 28.9 G/DL (ref 31.4–35)
MCV RBC AUTO: 106.5 FL (ref 79.6–97.8)
MONOCYTES # BLD: 0.4 K/UL (ref 0.1–1.3)
MONOCYTES NFR BLD: 5 % (ref 4–12)
NEUTS SEG # BLD: 3.7 K/UL (ref 1.7–8.2)
NEUTS SEG NFR BLD: 45 % (ref 43–78)
NRBC # BLD: 0 K/UL (ref 0–0.2)
PLATELET # BLD AUTO: 338 K/UL (ref 150–450)
PMV BLD AUTO: 10.8 FL (ref 9.4–12.3)
POTASSIUM SERPL-SCNC: 5.8 MMOL/L (ref 3.5–5.1)
PROT SERPL-MCNC: 7.3 G/DL (ref 6.3–8.2)
RBC # BLD AUTO: 4.74 M/UL (ref 4.05–5.2)
SODIUM SERPL-SCNC: 141 MMOL/L (ref 136–145)
TSH, 3RD GENERATION: 4.38 UIU/ML (ref 0.36–3.74)
WBC # BLD AUTO: 8.3 K/UL (ref 4.3–11.1)

## 2022-06-25 LAB
EST. AVERAGE GLUCOSE BLD GHB EST-MCNC: 131 MG/DL
HBA1C MFR BLD: 6.2 % (ref 4.2–6.3)

## 2022-06-29 ENCOUNTER — OFFICE VISIT (OUTPATIENT)
Dept: INTERNAL MEDICINE CLINIC | Facility: CLINIC | Age: 67
End: 2022-06-29
Payer: MEDICARE

## 2022-06-29 VITALS
DIASTOLIC BLOOD PRESSURE: 62 MMHG | HEART RATE: 96 BPM | HEIGHT: 68 IN | OXYGEN SATURATION: 96 % | SYSTOLIC BLOOD PRESSURE: 130 MMHG | WEIGHT: 166 LBS | BODY MASS INDEX: 25.16 KG/M2

## 2022-06-29 DIAGNOSIS — E10.42 DIABETIC POLYNEUROPATHY ASSOCIATED WITH TYPE 1 DIABETES MELLITUS (HCC): ICD-10-CM

## 2022-06-29 DIAGNOSIS — N18.31 STAGE 3A CHRONIC KIDNEY DISEASE (HCC): ICD-10-CM

## 2022-06-29 DIAGNOSIS — R26.89 BALANCE PROBLEM: ICD-10-CM

## 2022-06-29 DIAGNOSIS — R30.0 DYSURIA: Primary | ICD-10-CM

## 2022-06-29 DIAGNOSIS — E10.42 TYPE 1 DIABETES MELLITUS WITH DIABETIC POLYNEUROPATHY (HCC): ICD-10-CM

## 2022-06-29 DIAGNOSIS — Z00.00 MEDICARE ANNUAL WELLNESS VISIT, SUBSEQUENT: ICD-10-CM

## 2022-06-29 DIAGNOSIS — Z91.81 RISK FOR FALLS: ICD-10-CM

## 2022-06-29 PROBLEM — N18.30 CHRONIC RENAL DISEASE, STAGE III (HCC): Status: ACTIVE | Noted: 2022-06-29

## 2022-06-29 LAB
BACTERIA URINE, POC: NEGATIVE
BILIRUBIN, URINE, POC: NEGATIVE
BLOOD URINE, POC: NEGATIVE
CASTS URINE, POC: NORMAL
EPI CELLS URINE, POC: NORMAL
GLUCOSE URINE, POC: NEGATIVE
KETONES, URINE, POC: NEGATIVE
LEUKOCYTE ESTERASE, URINE, POC: NEGATIVE
NITRITE, URINE, POC: NEGATIVE
PH, URINE, POC: 7 (ref 4.6–8)
PROTEIN,URINE, POC: NORMAL
RBC, URINE, POC: NORMAL
SPECIFIC GRAVITY, URINE, POC: 1.01 (ref 1–1.03)
TRICHOMONAS URINE, POC: NORMAL
URINALYSIS CLARITY, POC: CLEAR
URINALYSIS COLOR, POC: YELLOW
UROBILINOGEN, POC: NORMAL
WBC, URINE, POC: NORMAL
YEAST, URINE, POC: NORMAL

## 2022-06-29 PROCEDURE — 99214 OFFICE O/P EST MOD 30 MIN: CPT | Performed by: INTERNAL MEDICINE

## 2022-06-29 PROCEDURE — 1123F ACP DISCUSS/DSCN MKR DOCD: CPT | Performed by: INTERNAL MEDICINE

## 2022-06-29 PROCEDURE — G0439 PPPS, SUBSEQ VISIT: HCPCS | Performed by: INTERNAL MEDICINE

## 2022-06-29 PROCEDURE — 3044F HG A1C LEVEL LT 7.0%: CPT | Performed by: INTERNAL MEDICINE

## 2022-06-29 PROCEDURE — 81000 URINALYSIS NONAUTO W/SCOPE: CPT | Performed by: INTERNAL MEDICINE

## 2022-06-29 RX ORDER — HYDROCODONE BITARTRATE AND ACETAMINOPHEN 10; 325 MG/1; MG/1
TABLET ORAL
Qty: 120 TABLET | Refills: 0 | Status: SHIPPED | OUTPATIENT
Start: 2022-06-29 | End: 2022-07-29

## 2022-06-29 RX ORDER — HYDROCODONE BITARTRATE AND ACETAMINOPHEN 10; 325 MG/1; MG/1
TABLET ORAL
COMMUNITY
Start: 2022-06-06 | End: 2022-06-29 | Stop reason: SDUPTHER

## 2022-06-29 RX ORDER — HYDROCODONE BITARTRATE AND ACETAMINOPHEN 10; 325 MG/1; MG/1
1 TABLET ORAL EVERY 6 HOURS PRN
Qty: 120 TABLET | Refills: 0 | Status: SHIPPED | OUTPATIENT
Start: 2022-08-29 | End: 2022-09-28

## 2022-06-29 RX ORDER — PEN NEEDLE, DIABETIC 31 GX5/16"
NEEDLE, DISPOSABLE MISCELLANEOUS
COMMUNITY
Start: 2022-06-02

## 2022-06-29 RX ORDER — KETOCONAZOLE 20 MG/ML
SHAMPOO TOPICAL
COMMUNITY
Start: 2022-05-12

## 2022-06-29 RX ORDER — OLOPATADINE HCL 0.2 %
DROPS OPHTHALMIC (EYE)
COMMUNITY
Start: 2022-05-10

## 2022-06-29 RX ORDER — PEN NEEDLE, DIABETIC 31 GX5/16"
NEEDLE, DISPOSABLE MISCELLANEOUS
COMMUNITY
Start: 2022-03-29 | End: 2022-06-29 | Stop reason: SDUPTHER

## 2022-06-29 RX ORDER — HYDROCODONE BITARTRATE AND ACETAMINOPHEN 10; 325 MG/1; MG/1
1 TABLET ORAL EVERY 6 HOURS PRN
Qty: 120 TABLET | Refills: 0 | Status: SHIPPED | OUTPATIENT
Start: 2022-07-29 | End: 2022-08-28

## 2022-06-29 ASSESSMENT — PATIENT HEALTH QUESTIONNAIRE - PHQ9
SUM OF ALL RESPONSES TO PHQ QUESTIONS 1-9: 7
2. FEELING DOWN, DEPRESSED OR HOPELESS: 0
9. THOUGHTS THAT YOU WOULD BE BETTER OFF DEAD, OR OF HURTING YOURSELF: 0
6. FEELING BAD ABOUT YOURSELF - OR THAT YOU ARE A FAILURE OR HAVE LET YOURSELF OR YOUR FAMILY DOWN: 0
SUM OF ALL RESPONSES TO PHQ QUESTIONS 1-9: 7
5. POOR APPETITE OR OVEREATING: 0
SUM OF ALL RESPONSES TO PHQ QUESTIONS 1-9: 7
8. MOVING OR SPEAKING SO SLOWLY THAT OTHER PEOPLE COULD HAVE NOTICED. OR THE OPPOSITE, BEING SO FIGETY OR RESTLESS THAT YOU HAVE BEEN MOVING AROUND A LOT MORE THAN USUAL: 0
SUM OF ALL RESPONSES TO PHQ QUESTIONS 1-9: 7
SUM OF ALL RESPONSES TO PHQ9 QUESTIONS 1 & 2: 1
3. TROUBLE FALLING OR STAYING ASLEEP: 3
7. TROUBLE CONCENTRATING ON THINGS, SUCH AS READING THE NEWSPAPER OR WATCHING TELEVISION: 0
4. FEELING TIRED OR HAVING LITTLE ENERGY: 3
1. LITTLE INTEREST OR PLEASURE IN DOING THINGS: 1
10. IF YOU CHECKED OFF ANY PROBLEMS, HOW DIFFICULT HAVE THESE PROBLEMS MADE IT FOR YOU TO DO YOUR WORK, TAKE CARE OF THINGS AT HOME, OR GET ALONG WITH OTHER PEOPLE: 0

## 2022-06-29 ASSESSMENT — LIFESTYLE VARIABLES
HOW OFTEN DO YOU HAVE A DRINK CONTAINING ALCOHOL: MONTHLY OR LESS
HOW MANY STANDARD DRINKS CONTAINING ALCOHOL DO YOU HAVE ON A TYPICAL DAY: 1 OR 2

## 2022-06-29 NOTE — PROGRESS NOTES
Iona Mckeon was seen today for follow-up, diabetes, medicare awv and dysuria. Diagnoses and all orders for this visit:    Dysuria  -     AMB POC URINALYSIS DIP STICK MANUAL W/ MICRO BSSC  -     Culture, Urine    Stage 3a chronic kidney disease ([facility]    Diabetic polyneuropathy associated with type 1 diabetes mellitus (HCC)  -     HYDROcodone-acetaminophen (NORCO)  MG per tablet; TAKE 1 TABLET BY MOUTH EVERY 6 HOURS AS NEEDED FOR PAIN (MAX DAILY AMOUNT 4 TABLETS)  -     HYDROcodone-acetaminophen (NORCO)  MG per tablet; Take 1 tablet by mouth every 6 hours as needed for Pain for up to 30 days. Max amount 4 tabs per day Intended supply: 30 days  -     HYDROcodone-acetaminophen (NORCO)  MG per tablet; Take 1 tablet by mouth every 6 hours as needed for Pain for up to 30 days.  Max 4 tabs per day Intended supply: 30 days    Type 1 diabetes mellitus with diabetic polyneuropathy ([facility] [address]    Balance problem  -     [address] for falls  -     [address] [facility] Medicare annual wellness visit, subsequent        Russell Salguero is a 78 y.o. female    Chief Complaint   Patient presents with    Follow-up     3 month check    Diabetes     DM foot exam for shoes    Medicare AWV    Dysuria     Lab Results   Component Value Date    LABA1C 6.2 06/24/2022    LABA1C 6.6 (H) 12/21/2021    LABA1C 5.7 (H) 01/15/2021     Lab Results   Component Value Date    LDLCALC 149 (H) 12/21/2021    CREATININE 1.20 (H) 06/24/2022       Good control t1 dm  Low abd discomfort 7 d   Feels like uti  Results for orders placed or performed in visit on 06/29/22   Culture, Urine    Specimen: URINE-CLEAN CATCH   Result Value Ref Range    Special Requests NO SPECIAL REQUESTS      Culture >100,000 COLONIES/mL ENTEROCOCCUS FAECALIS GROUP D (A)         Susceptibility    Enterococcus faecalis group d - BACTERIAL SUSCEPTIBILITY PANEL CASEY     Penicillin G 4 Sensitive ug/mL     vancomycin 2 Sensitive ug/mL     ampicillin 1 Sensitive ug/mL     nitrofurantoin <=16 Sensitive ug/mL   AMB POC URINALYSIS DIP STICK MANUAL W/ MICRO BSSC   Result Value Ref Range    Color (UA POC) Yellow     Clarity (UA POC) Clear     Glucose, Urine, POC Negative Negative    Bilirubin, Urine, POC Negative Negative    Ketones, Urine, POC Negative Negative    Specific Gravity, Urine, POC 1.010 1.001 - 1.035    Blood (UA POC) Negative Negative    pH, Urine, POC 7.0 4.6 - 8.0    Protein, Urine, POC Trace Negative    Urobilinogen, POC 0.2 mg/dL     Nitrite, Urine, POC Negative Negative    Leukocyte Esterase, Urine, POC Negative Negative    RBC, Urine, POC      WBC, Urine, POC      Epi Cells Urine, POC      Bacteria Urine, POC Negative Negative    Casts Urine, POC      Yeast, Urine, POC      Trichomonas Urine, POC         Past Medical History:   Diagnosis Date    Benign paroxysmal positional vertigo     Breast cancer ([facility] 12/2010    Cancer ([facility] 2010    lt breast    Cancer ([facility] 1988    colon    Chronic pain     Chronic renal disease, stage III [facility]) [[id number]] 6/29/2022    Diabetic polyneuropathy associated with type 1 diabetes mellitus ([facility] 1/13/2020    Gastroparesis     GERD (gastroesophageal reflux disease)     medicaton controlled    Glaucoma     Hiatal hernia     HLD (hyperlipidemia)     Hypertension     medication controlled    Neuropathy, diabetic (HCC)     Osteoporosis, unspecified     Personal history of malignant neoplasm of breast     Retinopathy due to secondary diabetes ([facility]     Thyroid disease     medication controlled    Type 1 diabetes ([facility] 5years of age    insulin, Hemoglobin A1c 6.0 on 7/25/18.      Unspecified hereditary and idiopathic peripheral neuropathy     Unspecified hypothyroidism        Family History   Problem Relation Age of Onset    Heart Disease Father     Diabetes Father         Type II    Heart Attack Father 48    Atrial Fibrillation Mother     No Known Problems Sister     Thyroid Disease Sister         multinodular goiter    Gout Brother     Colon Cancer Paternal Grandmother     Breast Cancer Mother         Social History     Socioeconomic History    Marital status:      Spouse name: Not on file    Number of children: Not on file    Years of education: Not on file    Highest education level: Not on file   Occupational History    Not on file   Tobacco Use    Smoking status: Never    Smokeless tobacco: Never   Substance and Sexual Activity    Alcohol use: No    Drug use: Yes     Types: OTC, Prescription    Sexual activity: Not on file   Other Topics Concern    Not on file   Social History Narrative    Not on file     Social Determinants of Health     Financial Resource Strain: Not on file   Food Insecurity: Not on file   Transportation Needs: Not on file   Physical Activity: Inactive    Days of Exercise per Week: 0 days    Minutes of Exercise per Session: 0 min   Stress: Not on file   Social Connections: Not on file   Intimate Partner Violence: Not on file   Housing Stability: Not on file         Current Outpatient Medications:     Contact [employer] [facility], Take 1 tablet by mouth 3 times daily, Disp: , Rfl:     HYDROcodone-acetaminophen (NORCO)  MG per tablet, Take 1 tablet by mouth every 6 hours as needed for Pain for up to 30 days. Max 4 tabs per day Intended supply: 30 days, Disp: 120 tablet, Rfl: 0    HUMALOG KWIKPEN 100 UNIT/ML SOPN, Inject into the skin 2 units before meals plus correction scale 2/50>200, max daily dose 30 units, Disp: 30 mL, Rfl: 3    Methylcobalamin (METHYL B-12 PO), Take 1,000 mcg by mouth daily. , Disp: , Rfl:     B-D ULTRAFINE III SHORT PEN 31G X 8 MM MISC, USE 1 PEN NEEDLE TO INJECT INSULIN FIVE TIMES DAILY, Disp: , Rfl:     ketoconazole (NIZORAL) 2 % shampoo, USE SHAMPOO TO WASH SCALP 2-3 TIMES A WEEK, LET SIT 15 MINUTES PRIOR TO RINSING, Disp: , Rfl:     hydrocortisone 2.5 % cream, APPLY TOPICALLY TO FACE TWICE DAILY AS NEEDED FOR ITCHING, Disp: , Rfl:     PATADAY 0.2 % SOLN ophthalmic solution, INSTILL 1 DROP EVERY DAY INTO BOTH EYES, Disp: , Rfl:     TURMERIC PO, Take 700 mg by mouth daily, Disp: , Rfl:     Alpha-Lipoic Acid 600 MG CAPS, Take 1 tablet by mouth 2 times daily, Disp: , Rfl:     Calcium Carbonate-Vitamin D (CALCIUM-VITAMIN D) 600-125 MG-UNIT TABS, Take 1 tablet by mouth 2 times daily, Disp: , Rfl:     Carboxymethylcellulose Sodium 0.25 % SOLN, Apply 1 drop to eye daily as needed (glaucoma), Disp: , Rfl:     Glucagon (Venora Fluke 2-PACK) 1 MG/0.2ML SOAJ, Inject 1 mg into the skin as needed, Disp: , Rfl:     insulin aspart (NOVOLOG FLEXPEN) 100 UNIT/ML injection pen, Inject into the skin 2 units before meals plus correction scale 2/50>200, max daily dose 30 units, Disp: , Rfl:     insulin glargine (LANTUS SOLOSTAR) 100 UNIT/ML injection pen, Inject 30 Units into the skin nightly, Disp: , Rfl:     levothyroxine (SYNTHROID) 112 MCG tablet, Take 112 mcg by mouth every morning (before breakfast), Disp: , Rfl:     loratadine (CLARITIN) 10 MG tablet, Take 10 mg by mouth daily as needed, Disp: , Rfl:     losartan (COZAAR) 100 MG tablet, Take 100 mg by mouth daily, Disp: , Rfl:     Lutein-Zeaxanthin 20-1 MG CAPS, Take 25 mg by mouth daily, Disp: , Rfl:     nystatin (MYCOSTATIN) 165911 UNIT/GM powder, Apply topically 4 times daily, Disp: , Rfl:     ondansetron (ZOFRAN-ODT) 8 MG TBDP disintegrating tablet, Take 8 mg by mouth every 8 hours as needed for Nausea or Vomiting, Disp: , Rfl:     pantoprazole (PROTONIX) 40 MG tablet, Take 40 mg by mouth daily, Disp: , Rfl:     pilocarpine (PILOCAR) 1 % ophthalmic solution, INSTILL 1 DROP INTO THE LEFT EYE TWICE DAILY, Disp: , Rfl:     senna-docusate (PERICOLACE) 8.6-50 MG per tablet, Take 1 tablet by mouth daily as needed, Disp: , Rfl:     Travoprost, BAK Free, (TRAVATAN Z) 0.004 % SOLN ophthalmic solution, Place 1 drop into both eyes nightly, Disp: , Rfl:     Allergies   Allergen Reactions    Lisinopril Other (See Comments)    Mirabegron Hives    Buspirone Palpitations         Review of Systems   Constitutional:  Positive for fatigue. Genitourinary:  Positive for dysuria. Neurological:  Positive for numbness. Vitals:    06/29/22 1052 06/29/22 1124   BP: (!) 144/60 130/62   Pulse: 96    SpO2: 96%    Weight: 166 lb (75.3 kg)    Height: 5' 8\" (1.727 m)            Physical Exam  Cardiovascular:      Pulses:           Dorsalis pedis pulses are 2+ on the right side and 2+ on the left side. Musculoskeletal:        Legs:         Feet:    Feet:      Right foot:      Skin integrity: Callus present. Toenail Condition: Right toenails are abnormally thick. Left foot:      Skin integrity: Callus present. Toenail Condition: Left toenails are abnormally thick. Comments: Pre ulcerative callous deformity         Leslye Cruz was seen today for follow-up, diabetes, medicare awv and dysuria. Diagnoses and all orders for this visit:    Dysuria  -     AMB POC URINALYSIS DIP STICK MANUAL W/ MICRO BSSC  -     Culture, Urine    Stage 3a chronic kidney disease ([facility] [address]    Diabetic polyneuropathy associated with type 1 diabetes mellitus (HCC)  -     HYDROcodone-acetaminophen (NORCO)  MG per tablet; TAKE 1 TABLET BY MOUTH EVERY 6 HOURS AS NEEDED FOR PAIN (MAX DAILY AMOUNT 4 TABLETS)  -     HYDROcodone-acetaminophen (NORCO)  MG per tablet; Take 1 tablet by mouth every 6 hours as needed for Pain for up to 30 days. Max amount 4 tabs per day Intended supply: 30 days  -     HYDROcodone-acetaminophen (NORCO)  MG per tablet; Take 1 tablet by mouth every 6 hours as needed for Pain for up to 30 days.  Max 4 tabs per day Intended supply: 30 days    Type 1 diabetes mellitus with diabetic polyneuropathy ([facility] [address]    Balance problem  -     [address] for falls  -     [facility] annual wellness visit, subsequent               [facility] Carrier, DO    Medicare Annual Wellness Visit    Chaka Glez is here for Follow-up (3 month check), Diabetes (DM foot exam for shoes), Medicare AWV, and Dysuria    Assessment & Plan   Dysuria  -     AMB POC URINALYSIS DIP STICK MANUAL W/ MICRO BSSC  -     Culture, Urine  Stage 3a chronic kidney disease (HCC)  Diabetic polyneuropathy associated with type 1 diabetes mellitus (HCC)  -     HYDROcodone-acetaminophen (NORCO)  MG per tablet; TAKE 1 TABLET BY MOUTH EVERY 6 HOURS AS NEEDED FOR PAIN (MAX DAILY AMOUNT 4 TABLETS), Disp-120 tablet, R-0Normal  -     HYDROcodone-acetaminophen (NORCO)  MG per tablet; Take 1 tablet by mouth every 6 hours as needed for Pain for up to 30 days. Max amount 4 tabs per day Intended supply: 30 days, Disp-120 tablet, R-0Normal  -     HYDROcodone-acetaminophen (NORCO)  MG per tablet; Take 1 tablet by mouth every 6 hours as needed for Pain for up to 30 days. Max 4 tabs per day Intended supply: 30 days, Disp-120 tablet, R-0Normal  Type 1 diabetes mellitus with diabetic polyneuropathy ([facility] [address]  Balance problem  -     [address] for falls  -     [address] [facility] Medicare annual wellness visit, subsequent      Recommendations for Preventive Services Due: see orders and patient instructions/AVS.  Recommended screening schedule for the next 5-10 years is provided to the patient in written form: see Patient Instructions/AVS.     Return in 3 months (on 9/29/2022) for Medicare Annual Wellness Visit in 1 year. Subjective   The following acute and/or chronic problems were also addressed today:  Dysuria diabets newuropathy etc.    Patient's complete Health Risk Assessment and screening values have been reviewed and are found in Flowsheets. The following problems were reviewed today and where indicated follow up appointments were made and/or referrals ordered.     Positive Risk Factor Screenings with Interventions:    Fall Risk:  Do you feel unsteady or are you worried about falling? : (!) yes (walks with cane)  2 or more falls in past year?: no  Fall with injury in past year?: no     Fall Risk Interventions:    home health        Depression Interventions:  Patient advised to follow-up in this office for further evaluation and treatment within 1 week      Drug Use Screening:    DAST-10 Score Interpretation:  1-2: Low level - Monitor, re-assess at a later date; 3-5: Moderate level - Further Investigation; 6-8: Substantial level - Intensive Assessment; 9-10: Severe level - Intensive Assessment    Substance Use - Drug Use Interventions:  none         Opioid Risk: (Low risk score <55) Opioid risk score: 8    Patient is low risk for opioid use disorder or overdose.   Last PDMP [place] as Reviewed:  Review User Review Instant Review Result             General Health and ACP:  General  In general, how would you say your health is?: Fair  In the past 7 days, have you experienced any of the following: New or Increased Pain, New or Increased Fatigue, Loneliness, Social Isolation, Stress or Anger?: (!) Yes  Select all that apply: (!) New or Increased Fatigue, New or Increased Pain  Do you get the social and emotional support that you need?: Yes  Do you have a Living Will?: Yes    Advance Directives       Power of  Living Will ACP-Advance Directive ACP-Power of     Not on File Not on File Not on File Not on File        General Health Risk Interventions:  Pain issues: home exercises provided    Health Habits/Nutrition:  Physical Activity: Inactive    Days of Exercise per Week: 0 days    Minutes of Exercise per Session: 0 min     Have you lost any weight without trying in the past 3 months?: No  Body mass index: (!) 25.24     Health Habits/Nutrition Interventions:  ok     Safety:  Do you have working smoke detectors?: Yes  Do you have any tripping hazards - loose or unsecured carpets or rugs?: No  Do you have any tripping hazards - clutter in doorways, halls, or stairs?: No  Do you have either shower bars, grab bars, non-slip mats or non-slip surfaces in your shower or bathtub?: Yes  Do all of your stairways have a railing or banister?: (!) No  Do you always fasten your seatbelt when you are in a car?: Yes    Safety Interventions:  Home safety tips provided           Objective   Vitals:    06/29/22 1052 06/29/22 1124   BP: (!) 144/60 130/62   Pulse: 96    SpO2: 96%    Weight: 166 lb (75.3 kg)    Height: 5' 8\" (1.727 m)       Body mass index is 25.24 kg/m². General Appearance: alert and oriented to person, place and time, well-developed and well-nourished, in no acute distress       Allergies   Allergen Reactions    Lisinopril Other (See Comments)    Mirabegron Hives    Buspirone Palpitations     Prior to Visit Medications    Medication Sig Taking? Authorizing Provider   Contact [employer] [facility] MISC Take 1 tablet by mouth 3 times daily Yes Historical Provider, MD   HYDROcodone-acetaminophen (NORCO)  MG per tablet Take 1 tablet by mouth every 6 hours as needed for Pain for up to 30 days. Max 4 tabs per day Intended supply: 30 days Yes Douglas Eckert, DO   HUMALOG KWIKPEN 100 UNIT/ML SOPN Inject into the skin 2 units before meals plus correction scale 2/50>200, max daily dose 30 units  Елена Valdez PA-C   Methylcobalamin (METHYL B-12 PO) Take 1,000 mcg by mouth daily.   Historical Provider, MD STEVENS ULTRAFINE III SHORT PEN 31G X 8 MM MISC USE 1 PEN NEEDLE TO INJECT INSULIN FIVE TIMES DAILY  Historical Provider, MD   ketoconazole (NIZORAL) 2 % shampoo USE SHAMPOO TO [address] SCALP 2-3 TIMES A WEEK, LET SIT 15 MINUTES PRIOR TO RINSING  Historical Provider, MD   hydrocortisone 2.5 % cream APPLY TOPICALLY TO FACE TWICE DAILY AS NEEDED FOR ITCHING  Historical Provider, MD   PATADAY 0.2 % SOLN ophthalmic solution INSTILL 1 DROP EVERY DAY INTO BOTH EYES  Historical Provider, MD   TURMERIC PO Take 700 mg by mouth daily  Ar Automatic Reconciliation   Alpha-Lipoic Acid 600 MG CAPS Take 1 tablet by mouth 2 times daily  Ar Automatic Reconciliation   Calcium Carbonate-Vitamin D (CALCIUM-VITAMIN D) 600-125 MG-UNIT TABS Take 1 tablet by mouth 2 times daily  Ar Automatic Reconciliation   Carboxymethylcellulose Sodium 0.25 % SOLN Apply 1 drop to eye daily as needed (glaucoma)  Ar Automatic Reconciliation   Glucagon (GVOKE HYPOPEN 2-PACK) 1 MG/0.2ML SOAJ Inject 1 mg into the skin as needed  Ar Automatic Reconciliation   insulin aspart (NOVOLOG FLEXPEN) 100 UNIT/ML injection pen Inject into the skin 2 units before meals plus correction scale 2/50>200, max daily dose 30 units  Ar Automatic Reconciliation   insulin glargine (LANTUS SOLOSTAR) 100 UNIT/ML injection pen Inject 30 Units into the skin nightly  Ar Automatic Reconciliation   levothyroxine (SYNTHROID) 112 MCG tablet Take 112 mcg by mouth every morning (before breakfast)  Ar Automatic Reconciliation   loratadine (CLARITIN) 10 MG tablet Take 10 mg by mouth daily as needed  Ar Automatic Reconciliation   losartan (COZAAR) 100 MG tablet Take 100 mg by mouth daily  Ar Automatic Reconciliation   Lutein-Zeaxanthin 20-1 MG CAPS Take 25 mg by mouth daily  Ar Automatic Reconciliation   nystatin (MYCOSTATIN) 767068 UNIT/GM powder Apply topically 4 times daily  Ar Automatic Reconciliation   ondansetron (ZOFRAN-ODT) 8 MG TBDP disintegrating tablet Take 8 mg by mouth every 8 hours as needed for Nausea or Vomiting  Ar Automatic Reconciliation   pantoprazole (PROTONIX) 40 MG tablet Take 40 mg by mouth daily  Ar Automatic Reconciliation   pilocarpine (PILOCAR) 1 % ophthalmic solution INSTILL 1 DROP INTO THE LEFT EYE TWICE DAILY  Ar Automatic Reconciliation   senna-docusate (PERICOLACE) 8.6-50 MG per tablet Take 1 tablet by mouth daily as needed  Ar Automatic Reconciliation   Travoprost, BAK Free, (TRAVATAN Z) 0.004 % SOLN ophthalmic solution Place 1 drop into both eyes nightly  Ar Automatic Reconciliation       CareTeam (Including outside providers/suppliers regularly involved in providing care): Patient Care Team:  Wilfred Nichols DO as PCP - General  Wilfred Nichols DO as PCP - [facility] Empaneled Provider  Wilfred Nichols DO as Referring Physician  Yessica Yoo MD as Physician     Reviewed and updated this visit:  Allergies

## 2022-06-29 NOTE — PATIENT INSTRUCTIONS
Personalized Preventive Plan for Phan Brown - 6/29/2022  Medicare offers a range of preventive health benefits. Some of the tests and screenings are paid in full while other may be subject to a deductible, co-insurance, and/or copay. Some of these benefits include a comprehensive review of your medical history including lifestyle, illnesses that may run in your family, and various assessments and screenings as appropriate. After reviewing your medical record and screening and assessments performed today your provider may have ordered immunizations, labs, imaging, and/or referrals for you. A list of these orders (if applicable) as well as your Preventive Care list are included within your After Visit Summary for your review. Other Preventive Recommendations:    · A preventive eye exam performed by an eye specialist is recommended every 1-2 years to screen for glaucoma; cataracts, macular degeneration, and other eye disorders. · A preventive dental visit is recommended every 6 months. · Try to get at least 150 minutes of exercise per week or 10,000 steps per day on a pedometer . · Order or download the FREE \"Exercise & Physical Activity: Your Everyday Guide\" from [employer]. Call 1-532.193.6017 or search [employer] online. · You need 3513-7418 mg of calcium and 6840-9659 IU of vitamin D per day. It is possible to meet your calcium requirement with diet alone, but a vitamin D supplement is usually necessary to meet this goal.  · When exposed to the sun, use a sunscreen that protects against both UVA and UVB radiation with an SPF of 30 or greater. Reapply every 2 to 3 hours or after sweating, drying off with a towel, or swimming. · Always wear a seat belt when traveling in a car. Always wear a helmet when riding a bicycle or motorcycle.

## 2022-06-30 ENCOUNTER — HOME CARE VISIT (OUTPATIENT)
Dept: SCHEDULING | Facility: HOME HEALTH | Age: 67
End: 2022-06-30
Payer: MEDICARE

## 2022-06-30 ENCOUNTER — HOME HEALTH ADMISSION (OUTPATIENT)
Dept: HOME HEALTH SERVICES | Facility: HOME HEALTH | Age: 67
End: 2022-06-30
Payer: MEDICARE

## 2022-06-30 VITALS
OXYGEN SATURATION: 97 % | HEART RATE: 78 BPM | RESPIRATION RATE: 17 BRPM | DIASTOLIC BLOOD PRESSURE: 78 MMHG | TEMPERATURE: 97.3 F | SYSTOLIC BLOOD PRESSURE: 142 MMHG

## 2022-06-30 PROCEDURE — 400013 HH SOC

## 2022-06-30 PROCEDURE — G0151 HHCP-SERV OF PT,EA 15 MIN: HCPCS

## 2022-07-01 ASSESSMENT — ENCOUNTER SYMPTOMS
PAIN LOCATION - PAIN QUALITY: SHARP, STABBING
DYSPNEA ACTIVITY LEVEL: AFTER AMBULATING MORE THAN 20 FT

## 2022-07-02 ENCOUNTER — TELEPHONE (OUTPATIENT)
Dept: INTERNAL MEDICINE CLINIC | Facility: CLINIC | Age: 67
End: 2022-07-02

## 2022-07-02 DIAGNOSIS — B95.2 ENTEROCOCCUS UTI: Primary | ICD-10-CM

## 2022-07-02 DIAGNOSIS — N39.0 ENTEROCOCCUS UTI: Primary | ICD-10-CM

## 2022-07-02 LAB
BACTERIA SPEC CULT: ABNORMAL
SERVICE CMNT-IMP: ABNORMAL

## 2022-07-02 RX ORDER — AMOXICILLIN 500 MG/1
500 CAPSULE ORAL 3 TIMES DAILY
Qty: 15 CAPSULE | Refills: 0 | Status: SHIPPED | OUTPATIENT
Start: 2022-07-02 | End: 2022-07-07 | Stop reason: SDUPTHER

## 2022-07-04 ENCOUNTER — HOME CARE VISIT (OUTPATIENT)
Dept: HOME HEALTH SERVICES | Facility: HOME HEALTH | Age: 67
End: 2022-07-04
Payer: MEDICARE

## 2022-07-05 ENCOUNTER — HOME CARE VISIT (OUTPATIENT)
Dept: SCHEDULING | Facility: HOME HEALTH | Age: 67
End: 2022-07-05
Payer: MEDICARE

## 2022-07-05 VITALS
OXYGEN SATURATION: 97 % | DIASTOLIC BLOOD PRESSURE: 62 MMHG | TEMPERATURE: 98.5 F | RESPIRATION RATE: 17 BRPM | HEART RATE: 78 BPM | SYSTOLIC BLOOD PRESSURE: 126 MMHG

## 2022-07-05 PROCEDURE — G0157 HHC PT ASSISTANT EA 15: HCPCS

## 2022-07-05 PROCEDURE — G0152 HHCP-SERV OF OT,EA 15 MIN: HCPCS

## 2022-07-06 VITALS
HEART RATE: 68 BPM | TEMPERATURE: 97.7 F | SYSTOLIC BLOOD PRESSURE: 138 MMHG | DIASTOLIC BLOOD PRESSURE: 58 MMHG | RESPIRATION RATE: 16 BRPM | OXYGEN SATURATION: 98 %

## 2022-07-07 ENCOUNTER — HOME CARE VISIT (OUTPATIENT)
Dept: SCHEDULING | Facility: HOME HEALTH | Age: 67
End: 2022-07-07
Payer: MEDICARE

## 2022-07-07 ENCOUNTER — TELEPHONE (OUTPATIENT)
Dept: INTERNAL MEDICINE CLINIC | Facility: CLINIC | Age: 67
End: 2022-07-07

## 2022-07-07 VITALS
RESPIRATION RATE: 17 BRPM | OXYGEN SATURATION: 98 % | DIASTOLIC BLOOD PRESSURE: 70 MMHG | TEMPERATURE: 98.1 F | SYSTOLIC BLOOD PRESSURE: 132 MMHG | HEART RATE: 74 BPM

## 2022-07-07 DIAGNOSIS — N39.0 ENTEROCOCCUS UTI: ICD-10-CM

## 2022-07-07 DIAGNOSIS — B95.2 ENTEROCOCCUS UTI: ICD-10-CM

## 2022-07-07 PROCEDURE — G0155 HHCP-SVS OF CSW,EA 15 MIN: HCPCS

## 2022-07-07 PROCEDURE — G0157 HHC PT ASSISTANT EA 15: HCPCS

## 2022-07-07 RX ORDER — AMOXICILLIN 500 MG/1
500 CAPSULE ORAL 3 TIMES DAILY
Qty: 9 CAPSULE | Refills: 0 | Status: SHIPPED | OUTPATIENT
Start: 2022-07-07 | End: 2022-07-10

## 2022-07-12 ENCOUNTER — HOME CARE VISIT (OUTPATIENT)
Dept: SCHEDULING | Facility: HOME HEALTH | Age: 67
End: 2022-07-12
Payer: MEDICARE

## 2022-07-12 VITALS
HEART RATE: 71 BPM | RESPIRATION RATE: 17 BRPM | OXYGEN SATURATION: 98 % | TEMPERATURE: 97.5 F | DIASTOLIC BLOOD PRESSURE: 64 MMHG | SYSTOLIC BLOOD PRESSURE: 132 MMHG

## 2022-07-12 PROCEDURE — G0157 HHC PT ASSISTANT EA 15: HCPCS

## 2022-07-14 ENCOUNTER — HOME CARE VISIT (OUTPATIENT)
Dept: SCHEDULING | Facility: HOME HEALTH | Age: 67
End: 2022-07-14
Payer: MEDICARE

## 2022-07-14 ENCOUNTER — TELEPHONE (OUTPATIENT)
Dept: INTERNAL MEDICINE CLINIC | Facility: CLINIC | Age: 67
End: 2022-07-14

## 2022-07-14 VITALS
RESPIRATION RATE: 17 BRPM | HEART RATE: 78 BPM | TEMPERATURE: 98.2 F | DIASTOLIC BLOOD PRESSURE: 62 MMHG | SYSTOLIC BLOOD PRESSURE: 132 MMHG | OXYGEN SATURATION: 100 %

## 2022-07-14 PROCEDURE — G0157 HHC PT ASSISTANT EA 15: HCPCS

## 2022-07-19 ENCOUNTER — HOME CARE VISIT (OUTPATIENT)
Dept: SCHEDULING | Facility: HOME HEALTH | Age: 67
End: 2022-07-19
Payer: MEDICARE

## 2022-07-19 ENCOUNTER — TELEPHONE (OUTPATIENT)
Dept: ENDOCRINOLOGY | Age: 67
End: 2022-07-19

## 2022-07-19 VITALS
RESPIRATION RATE: 17 BRPM | DIASTOLIC BLOOD PRESSURE: 70 MMHG | HEART RATE: 76 BPM | SYSTOLIC BLOOD PRESSURE: 116 MMHG | OXYGEN SATURATION: 98 % | TEMPERATURE: 98.2 F

## 2022-07-19 PROCEDURE — G0157 HHC PT ASSISTANT EA 15: HCPCS

## 2022-07-19 RX ORDER — INSULIN LISPRO 100 [IU]/ML
INJECTION, SOLUTION INTRAVENOUS; SUBCUTANEOUS
Qty: 30 ML | Refills: 3 | Status: SHIPPED | OUTPATIENT
Start: 2022-07-19

## 2022-07-19 NOTE — TELEPHONE ENCOUNTER
Coreyalog sent to [facility] on file. Please have pt advise if any issues after changing insulin from novolog to humalog and verify this is correct pharmacy for her insulin please.

## 2022-07-19 NOTE — TELEPHONE ENCOUNTER
Called patient and she stated that she uses [facility]. She reached back out to them and they stated that she can get her Novolog she just needs updated paperwork. Patient states that Linda Dietrich will send paperwork to be filled out to renew patient's Lantus and Novolog. Patient advised that we will reach out once paperwork has been filled out and faxed. Patient does have enough medication at this time.

## 2022-07-19 NOTE — TELEPHONE ENCOUNTER
Patient called and stated that her prescription company [employer] will no longer be able to supply her with Novolog. Patient states that they will be able to give her Humalog. Patient wants to know if provider can send in prescription for Humalog. Patient currently on a 2/50>200 correction scale.

## 2022-07-21 ENCOUNTER — HOSPITAL ENCOUNTER (OUTPATIENT)
Dept: MAMMOGRAPHY | Age: 67
Discharge: HOME | End: 2022-07-24
Payer: MEDICARE

## 2022-07-21 DIAGNOSIS — Z12.31 ENCOUNTER FOR SCREENING MAMMOGRAM FOR HIGH-RISK PATIENT: ICD-10-CM

## 2022-07-21 PROCEDURE — 77063 BREAST TOMOSYNTHESIS BI: CPT

## 2022-07-22 ENCOUNTER — HOME CARE VISIT (OUTPATIENT)
Dept: SCHEDULING | Facility: HOME HEALTH | Age: 67
End: 2022-07-22
Payer: MEDICARE

## 2022-07-22 VITALS
HEART RATE: 84 BPM | SYSTOLIC BLOOD PRESSURE: 115 MMHG | OXYGEN SATURATION: 97 % | DIASTOLIC BLOOD PRESSURE: 70 MMHG | RESPIRATION RATE: 17 BRPM | TEMPERATURE: 98.2 F

## 2022-07-22 PROCEDURE — G0157 HHC PT ASSISTANT EA 15: HCPCS

## 2022-07-22 ASSESSMENT — ENCOUNTER SYMPTOMS: PAIN LOCATION - PAIN QUALITY: ACHING

## 2022-07-27 ENCOUNTER — HOME CARE VISIT (OUTPATIENT)
Dept: SCHEDULING | Facility: HOME HEALTH | Age: 67
End: 2022-07-27
Payer: MEDICARE

## 2022-07-27 VITALS
HEART RATE: 72 BPM | TEMPERATURE: 98.4 F | SYSTOLIC BLOOD PRESSURE: 122 MMHG | RESPIRATION RATE: 18 BRPM | OXYGEN SATURATION: 98 % | DIASTOLIC BLOOD PRESSURE: 76 MMHG

## 2022-07-27 PROCEDURE — G0151 HHCP-SERV OF PT,EA 15 MIN: HCPCS

## 2022-07-27 ASSESSMENT — ENCOUNTER SYMPTOMS: PAIN LOCATION - PAIN QUALITY: SORE

## 2022-10-01 ENCOUNTER — APPOINTMENT (OUTPATIENT)
Dept: GENERAL RADIOLOGY | Age: 67
DRG: 494 | End: 2022-10-01
Payer: MEDICARE

## 2022-10-01 ENCOUNTER — APPOINTMENT (OUTPATIENT)
Dept: CT IMAGING | Age: 67
DRG: 494 | End: 2022-10-01
Payer: MEDICARE

## 2022-10-01 ENCOUNTER — HOSPITAL ENCOUNTER (INPATIENT)
Age: 67
LOS: 5 days | Discharge: NURSING, REHAB OR LONG-TERM CARE | DRG: 494 | End: 2022-10-06
Attending: STUDENT IN AN ORGANIZED HEALTH CARE EDUCATION/TRAINING PROGRAM | Admitting: FAMILY MEDICINE
Payer: MEDICARE

## 2022-10-01 DIAGNOSIS — S82.845B: Primary | ICD-10-CM

## 2022-10-01 DIAGNOSIS — S82.842A CLOSED BIMALLEOLAR FRACTURE OF LEFT ANKLE, INITIAL ENCOUNTER: ICD-10-CM

## 2022-10-01 LAB
ALBUMIN SERPL-MCNC: 3.7 G/DL (ref 3.2–4.6)
ALBUMIN/GLOB SERPL: 1.2 {RATIO} (ref 1.2–3.5)
ALP SERPL-CCNC: 65 U/L (ref 50–136)
ALT SERPL-CCNC: 22 U/L (ref 12–65)
AMMONIA PLAS-SCNC: 35 UMOL/L (ref 11–32)
ANION GAP SERPL CALC-SCNC: 4 MMOL/L (ref 4–13)
AST SERPL-CCNC: 27 U/L (ref 15–37)
BASOPHILS # BLD: 0 K/UL (ref 0–0.2)
BASOPHILS NFR BLD: 0 % (ref 0–2)
BILIRUB SERPL-MCNC: 1.1 MG/DL (ref 0.2–1.1)
BUN SERPL-MCNC: 20 MG/DL (ref 8–23)
CALCIUM SERPL-MCNC: 8.9 MG/DL (ref 8.3–10.4)
CHLORIDE SERPL-SCNC: 101 MMOL/L (ref 101–110)
CO2 SERPL-SCNC: 28 MMOL/L (ref 21–32)
CREAT SERPL-MCNC: 0.8 MG/DL (ref 0.6–1)
DIFFERENTIAL METHOD BLD: ABNORMAL
EKG ATRIAL RATE: 86 BPM
EKG DIAGNOSIS: NORMAL
EKG P AXIS: 60 DEGREES
EKG P-R INTERVAL: 142 MS
EKG Q-T INTERVAL: 382 MS
EKG QRS DURATION: 82 MS
EKG QTC CALCULATION (BAZETT): 457 MS
EKG R AXIS: 60 DEGREES
EKG T AXIS: 83 DEGREES
EKG VENTRICULAR RATE: 86 BPM
EOSINOPHIL # BLD: 0 K/UL (ref 0–0.8)
EOSINOPHIL NFR BLD: 0 % (ref 0.5–7.8)
ERYTHROCYTE [DISTWIDTH] IN BLOOD BY AUTOMATED COUNT: 13.2 % (ref 11.9–14.6)
ERYTHROCYTE [SEDIMENTATION RATE] IN BLOOD: 6 MM/HR (ref 0–30)
FOLATE SERPL-MCNC: 10.6 NG/ML (ref 3.1–17.5)
GLOBULIN SER CALC-MCNC: 3 G/DL (ref 2.3–3.5)
GLUCOSE SERPL-MCNC: 149 MG/DL (ref 65–100)
HCT VFR BLD AUTO: 34.5 % (ref 35.8–46.3)
HGB BLD-MCNC: 11.7 G/DL (ref 11.7–15.4)
IMM GRANULOCYTES # BLD AUTO: 0 K/UL (ref 0–0.5)
IMM GRANULOCYTES NFR BLD AUTO: 0 % (ref 0–5)
LYMPHOCYTES # BLD: 0.8 K/UL (ref 0.5–4.6)
LYMPHOCYTES NFR BLD: 9 % (ref 13–44)
MCH RBC QN AUTO: 33.1 PG (ref 26.1–32.9)
MCHC RBC AUTO-ENTMCNC: 33.9 G/DL (ref 31.4–35)
MCV RBC AUTO: 97.5 FL (ref 79.6–97.8)
MONOCYTES # BLD: 1.1 K/UL (ref 0.1–1.3)
MONOCYTES NFR BLD: 11 % (ref 4–12)
NEUTS SEG # BLD: 7.5 K/UL (ref 1.7–8.2)
NEUTS SEG NFR BLD: 80 % (ref 43–78)
NRBC # BLD: 0 K/UL (ref 0–0.2)
PLATELET # BLD AUTO: 306 K/UL (ref 150–450)
PMV BLD AUTO: 9.7 FL (ref 9.4–12.3)
POTASSIUM SERPL-SCNC: 4.1 MMOL/L (ref 3.5–5.1)
PROT SERPL-MCNC: 6.7 G/DL (ref 6.3–8.2)
RBC # BLD AUTO: 3.54 M/UL (ref 4.05–5.2)
SODIUM SERPL-SCNC: 133 MMOL/L (ref 136–145)
TROPONIN I SERPL HS-MCNC: 18 PG/ML (ref 0–14)
VIT B12 SERPL-MCNC: 629 PG/ML (ref 193–986)
WBC # BLD AUTO: 9.4 K/UL (ref 4.3–11.1)

## 2022-10-01 PROCEDURE — 82746 ASSAY OF FOLIC ACID SERUM: CPT

## 2022-10-01 PROCEDURE — 6370000000 HC RX 637 (ALT 250 FOR IP): Performed by: INTERNAL MEDICINE

## 2022-10-01 PROCEDURE — 29515 APPLICATION SHORT LEG SPLINT: CPT

## 2022-10-01 PROCEDURE — 2580000003 HC RX 258: Performed by: INTERNAL MEDICINE

## 2022-10-01 PROCEDURE — 6360000002 HC RX W HCPCS: Performed by: STUDENT IN AN ORGANIZED HEALTH CARE EDUCATION/TRAINING PROGRAM

## 2022-10-01 PROCEDURE — 82140 ASSAY OF AMMONIA: CPT

## 2022-10-01 PROCEDURE — 99285 EMERGENCY DEPT VISIT HI MDM: CPT

## 2022-10-01 PROCEDURE — 80053 COMPREHEN METABOLIC PANEL: CPT

## 2022-10-01 PROCEDURE — 1100000000 HC RM PRIVATE

## 2022-10-01 PROCEDURE — 93005 ELECTROCARDIOGRAM TRACING: CPT | Performed by: INTERNAL MEDICINE

## 2022-10-01 PROCEDURE — 85025 COMPLETE CBC W/AUTO DIFF WBC: CPT

## 2022-10-01 PROCEDURE — 82607 VITAMIN B-12: CPT

## 2022-10-01 PROCEDURE — 96375 TX/PRO/DX INJ NEW DRUG ADDON: CPT

## 2022-10-01 PROCEDURE — 96372 THER/PROPH/DIAG INJ SC/IM: CPT

## 2022-10-01 PROCEDURE — 85652 RBC SED RATE AUTOMATED: CPT

## 2022-10-01 PROCEDURE — 84484 ASSAY OF TROPONIN QUANT: CPT

## 2022-10-01 PROCEDURE — 86592 SYPHILIS TEST NON-TREP QUAL: CPT

## 2022-10-01 PROCEDURE — 96374 THER/PROPH/DIAG INJ IV PUSH: CPT

## 2022-10-01 PROCEDURE — 70450 CT HEAD/BRAIN W/O DYE: CPT

## 2022-10-01 PROCEDURE — 36415 COLL VENOUS BLD VENIPUNCTURE: CPT

## 2022-10-01 PROCEDURE — 73610 X-RAY EXAM OF ANKLE: CPT

## 2022-10-01 PROCEDURE — 2500000003 HC RX 250 WO HCPCS: Performed by: INTERNAL MEDICINE

## 2022-10-01 RX ORDER — ONDANSETRON 4 MG/1
8 TABLET, ORALLY DISINTEGRATING ORAL EVERY 8 HOURS PRN
Status: DISCONTINUED | OUTPATIENT
Start: 2022-10-01 | End: 2022-10-06 | Stop reason: HOSPADM

## 2022-10-01 RX ORDER — KETOTIFEN FUMARATE 0.35 MG/ML
1 SOLUTION/ DROPS OPHTHALMIC 2 TIMES DAILY
Status: DISCONTINUED | OUTPATIENT
Start: 2022-10-01 | End: 2022-10-06 | Stop reason: HOSPADM

## 2022-10-01 RX ORDER — INSULIN LISPRO 100 [IU]/ML
0-4 INJECTION, SOLUTION INTRAVENOUS; SUBCUTANEOUS NIGHTLY
Status: DISCONTINUED | OUTPATIENT
Start: 2022-10-01 | End: 2022-10-06 | Stop reason: HOSPADM

## 2022-10-01 RX ORDER — DEXTROSE MONOHYDRATE 100 MG/ML
INJECTION, SOLUTION INTRAVENOUS CONTINUOUS PRN
Status: DISCONTINUED | OUTPATIENT
Start: 2022-10-01 | End: 2022-10-06 | Stop reason: HOSPADM

## 2022-10-01 RX ORDER — HYDROCODONE BITARTRATE AND ACETAMINOPHEN 7.5; 325 MG/1; MG/1
1 TABLET ORAL EVERY 6 HOURS PRN
Status: DISCONTINUED | OUTPATIENT
Start: 2022-10-01 | End: 2022-10-06 | Stop reason: HOSPADM

## 2022-10-01 RX ORDER — LOSARTAN POTASSIUM 50 MG/1
100 TABLET ORAL DAILY
Status: DISCONTINUED | OUTPATIENT
Start: 2022-10-01 | End: 2022-10-06 | Stop reason: HOSPADM

## 2022-10-01 RX ORDER — CETIRIZINE HYDROCHLORIDE 10 MG/1
10 TABLET ORAL DAILY PRN
Status: DISCONTINUED | OUTPATIENT
Start: 2022-10-01 | End: 2022-10-06 | Stop reason: HOSPADM

## 2022-10-01 RX ORDER — ALPRAZOLAM 0.5 MG/1
0.25 TABLET ORAL 2 TIMES DAILY PRN
Status: DISCONTINUED | OUTPATIENT
Start: 2022-10-01 | End: 2022-10-03 | Stop reason: HOSPADM

## 2022-10-01 RX ORDER — HYDROMORPHONE HYDROCHLORIDE 1 MG/ML
1 INJECTION, SOLUTION INTRAMUSCULAR; INTRAVENOUS; SUBCUTANEOUS
Status: COMPLETED | OUTPATIENT
Start: 2022-10-01 | End: 2022-10-01

## 2022-10-01 RX ORDER — MORPHINE SULFATE 4 MG/ML
4 INJECTION INTRAVENOUS EVERY 4 HOURS PRN
Status: DISCONTINUED | OUTPATIENT
Start: 2022-10-01 | End: 2022-10-03

## 2022-10-01 RX ORDER — ONDANSETRON 2 MG/ML
4 INJECTION INTRAMUSCULAR; INTRAVENOUS
Status: COMPLETED | OUTPATIENT
Start: 2022-10-01 | End: 2022-10-01

## 2022-10-01 RX ORDER — LEVOTHYROXINE SODIUM 112 UG/1
112 TABLET ORAL
Status: DISCONTINUED | OUTPATIENT
Start: 2022-10-02 | End: 2022-10-06 | Stop reason: HOSPADM

## 2022-10-01 RX ORDER — SODIUM CHLORIDE 0.9 % (FLUSH) 0.9 %
5-40 SYRINGE (ML) INJECTION EVERY 12 HOURS SCHEDULED
Status: DISCONTINUED | OUTPATIENT
Start: 2022-10-01 | End: 2022-10-06 | Stop reason: HOSPADM

## 2022-10-01 RX ORDER — ACETAMINOPHEN 650 MG/1
650 SUPPOSITORY RECTAL EVERY 6 HOURS PRN
Status: DISCONTINUED | OUTPATIENT
Start: 2022-10-01 | End: 2022-10-06 | Stop reason: HOSPADM

## 2022-10-01 RX ORDER — PANTOPRAZOLE SODIUM 40 MG/1
40 TABLET, DELAYED RELEASE ORAL DAILY
Status: DISCONTINUED | OUTPATIENT
Start: 2022-10-01 | End: 2022-10-06 | Stop reason: HOSPADM

## 2022-10-01 RX ORDER — PILOCARPINE HYDROCHLORIDE 10 MG/ML
1 SOLUTION/ DROPS OPHTHALMIC 2 TIMES DAILY
Status: DISCONTINUED | OUTPATIENT
Start: 2022-10-01 | End: 2022-10-06 | Stop reason: HOSPADM

## 2022-10-01 RX ORDER — ACETAMINOPHEN 325 MG/1
650 TABLET ORAL EVERY 6 HOURS PRN
Status: DISCONTINUED | OUTPATIENT
Start: 2022-10-01 | End: 2022-10-06 | Stop reason: HOSPADM

## 2022-10-01 RX ORDER — SODIUM CHLORIDE 0.9 % (FLUSH) 0.9 %
5-40 SYRINGE (ML) INJECTION PRN
Status: DISCONTINUED | OUTPATIENT
Start: 2022-10-01 | End: 2022-10-06 | Stop reason: HOSPADM

## 2022-10-01 RX ORDER — PERPHENAZINE 16 MG
1 TABLET ORAL 2 TIMES DAILY
Status: DISCONTINUED | OUTPATIENT
Start: 2022-10-01 | End: 2022-10-01 | Stop reason: RX

## 2022-10-01 RX ORDER — ENOXAPARIN SODIUM 100 MG/ML
40 INJECTION SUBCUTANEOUS DAILY
Status: DISCONTINUED | OUTPATIENT
Start: 2022-10-02 | End: 2022-10-03

## 2022-10-01 RX ORDER — INSULIN LISPRO 100 [IU]/ML
0-8 INJECTION, SOLUTION INTRAVENOUS; SUBCUTANEOUS
Status: DISCONTINUED | OUTPATIENT
Start: 2022-10-02 | End: 2022-10-06 | Stop reason: HOSPADM

## 2022-10-01 RX ORDER — KETOROLAC TROMETHAMINE 30 MG/ML
30 INJECTION, SOLUTION INTRAMUSCULAR; INTRAVENOUS ONCE
Status: COMPLETED | OUTPATIENT
Start: 2022-10-01 | End: 2022-10-01

## 2022-10-01 RX ORDER — INSULIN LISPRO 100 [IU]/ML
2 INJECTION, SOLUTION INTRAVENOUS; SUBCUTANEOUS
Status: DISCONTINUED | OUTPATIENT
Start: 2022-10-01 | End: 2022-10-06 | Stop reason: HOSPADM

## 2022-10-01 RX ORDER — POLYETHYLENE GLYCOL 3350 17 G/17G
17 POWDER, FOR SOLUTION ORAL DAILY PRN
Status: DISCONTINUED | OUTPATIENT
Start: 2022-10-01 | End: 2022-10-06 | Stop reason: HOSPADM

## 2022-10-01 RX ORDER — INSULIN GLARGINE 100 [IU]/ML
30 INJECTION, SOLUTION SUBCUTANEOUS NIGHTLY
Status: DISCONTINUED | OUTPATIENT
Start: 2022-10-01 | End: 2022-10-06 | Stop reason: HOSPADM

## 2022-10-01 RX ORDER — LATANOPROST 50 UG/ML
1 SOLUTION/ DROPS OPHTHALMIC NIGHTLY
Status: DISCONTINUED | OUTPATIENT
Start: 2022-10-01 | End: 2022-10-06 | Stop reason: HOSPADM

## 2022-10-01 RX ORDER — SENNA AND DOCUSATE SODIUM 50; 8.6 MG/1; MG/1
1 TABLET, FILM COATED ORAL DAILY PRN
Status: DISCONTINUED | OUTPATIENT
Start: 2022-10-01 | End: 2022-10-06 | Stop reason: HOSPADM

## 2022-10-01 RX ORDER — LUTEIN 20 MG
25 CAPSULE ORAL DAILY
Status: DISCONTINUED | OUTPATIENT
Start: 2022-10-01 | End: 2022-10-01 | Stop reason: RX

## 2022-10-01 RX ORDER — CARBOXYMETHYLCELLULOSE SODIUM 10 MG/ML
1 GEL OPHTHALMIC DAILY PRN
Status: DISCONTINUED | OUTPATIENT
Start: 2022-10-01 | End: 2022-10-06 | Stop reason: HOSPADM

## 2022-10-01 RX ORDER — SODIUM CHLORIDE 9 MG/ML
INJECTION, SOLUTION INTRAVENOUS PRN
Status: DISCONTINUED | OUTPATIENT
Start: 2022-10-01 | End: 2022-10-06 | Stop reason: HOSPADM

## 2022-10-01 RX ADMIN — ONDANSETRON 4 MG: 2 INJECTION INTRAMUSCULAR; INTRAVENOUS at 15:00

## 2022-10-01 RX ADMIN — HYDROCODONE BITARTRATE AND ACETAMINOPHEN 1 TABLET: 7.5; 325 TABLET ORAL at 20:13

## 2022-10-01 RX ADMIN — KETOROLAC TROMETHAMINE 30 MG: 30 INJECTION, SOLUTION INTRAMUSCULAR at 14:10

## 2022-10-01 RX ADMIN — ONDANSETRON 8 MG: 4 TABLET, ORALLY DISINTEGRATING ORAL at 20:32

## 2022-10-01 RX ADMIN — LOSARTAN POTASSIUM 100 MG: 50 TABLET, FILM COATED ORAL at 20:13

## 2022-10-01 RX ADMIN — SODIUM CHLORIDE, PRESERVATIVE FREE 10 ML: 5 INJECTION INTRAVENOUS at 20:17

## 2022-10-01 RX ADMIN — INSULIN GLARGINE 30 UNITS: 100 INJECTION, SOLUTION SUBCUTANEOUS at 20:11

## 2022-10-01 RX ADMIN — ALPRAZOLAM 0.25 MG: 0.5 TABLET ORAL at 20:09

## 2022-10-01 RX ADMIN — ANTI-FUNGAL POWDER MICONAZOLE NITRATE TALC FREE: 1.42 POWDER TOPICAL at 20:16

## 2022-10-01 RX ADMIN — PANTOPRAZOLE SODIUM 40 MG: 40 TABLET, DELAYED RELEASE ORAL at 20:14

## 2022-10-01 RX ADMIN — Medication 1 TABLET: at 20:14

## 2022-10-01 RX ADMIN — HYDROMORPHONE HYDROCHLORIDE 1 MG: 1 INJECTION, SOLUTION INTRAMUSCULAR; INTRAVENOUS; SUBCUTANEOUS at 15:01

## 2022-10-01 ASSESSMENT — ENCOUNTER SYMPTOMS: COLOR CHANGE: 1

## 2022-10-01 ASSESSMENT — PAIN DESCRIPTION - ORIENTATION
ORIENTATION: LEFT

## 2022-10-01 ASSESSMENT — PAIN SCALES - GENERAL
PAINLEVEL_OUTOF10: 9
PAINLEVEL_OUTOF10: 8
PAINLEVEL_OUTOF10: 4

## 2022-10-01 ASSESSMENT — PAIN DESCRIPTION - DESCRIPTORS
DESCRIPTORS: THROBBING
DESCRIPTORS: THROBBING

## 2022-10-01 ASSESSMENT — PAIN DESCRIPTION - FREQUENCY: FREQUENCY: CONTINUOUS

## 2022-10-01 ASSESSMENT — PAIN DESCRIPTION - LOCATION
LOCATION: ANKLE

## 2022-10-01 ASSESSMENT — PAIN - FUNCTIONAL ASSESSMENT: PAIN_FUNCTIONAL_ASSESSMENT: 0-10

## 2022-10-01 NOTE — PROGRESS NOTES
Patient attempted to self administer insulin after being educated that staff would be administering insulin during hospitalization. Again educated patient on safety concerns, and removed insulin to be stored in fridge.

## 2022-10-01 NOTE — ED TRIAGE NOTES
Pt arrives via EMS coming from home with c/o fall and pain to left ankle. States she fell from standing last night getting in bed but had minimal pain after the fall. States when she woke she went to stand and fell again r/t the pain. She then noticed her left ankle was red, swollen, and painful.  VS: /63, , ,

## 2022-10-01 NOTE — ED PROVIDER NOTES
Emergency Department Provider Note                   PCP:                Diogenes Alamo DO               Age: 78 y.o. Sex: female     No diagnosis found. DISPOSITION          MDM  Number of Diagnoses or Management Options  Closed bimalleolar fracture of left ankle, initial encounter  Diagnosis management comments: Her imaging shows evidence of bimalleolar fracture. Wants to avoid any medication that makes her \"loopy\". We will start with Toradol, she will need posterior short leg splint with stirrup splint, discussed patient case with Ortho who agrees with this plan and outpatient follow-up on Monday. Amount and/or Complexity of Data Reviewed  Tests in the radiology section of CPT®: ordered and reviewed  Tests in the medicine section of CPT®: reviewed  Discuss the patient with other providers: yes  Independent visualization of images, tracings, or specimens: yes    Risk of Complications, Morbidity, and/or Mortality  Presenting problems: moderate  Diagnostic procedures: low  Management options: moderate         ED Course as of 10/01/22 1601   Sat Oct 01, 2022   1548 Patient family now at bedside, voices concern for patient returning home and she was struggling significantly to utilize a walker prior to arrival.  Patient voices the same concern, lives alone currently. Will speak to the hospitalist regarding admission. I will update orthopedic providers as well.  [BR]      ED Course User Index  [BR] Kimberly Sinclair DO        Orders Placed This Encounter   Procedures    APPLY STIRRUP SPLINT    Short Leg Splint    XR ANKLE LEFT (MIN 3 VIEWS)    Ice to affected area        Medications   ketorolac (TORADOL) injection 30 mg (has no administration in time range)       New Prescriptions    No medications on file        Isabel Piña is a 78 y.o. female who presents to the Emergency Department with chief complaint of    Chief Complaint   Patient presents with    Ankle Pain      79-year-old female patient presenting to this department with reports of severe left ankle pain after a fall last evening. Patient states she was ambulating to her bed, had a misstep and subsequently fell to the ground. She reports feeling some discomfort in the ankle but was able to sleep last evening. She attempted to bear weight on the ankle this morning, fell again subsequently. She denies any other injury including head strikes or loss of consciousness. She reports no headache, nausea or vomiting. She denies associated fever or chills. Patient arrives via EMS with ice in place to the affected ankle. Patient reports a history of juvenile diabetes for many years which has resulted in significant peripheral neuropathy, limiting her sensation in the lower extremities. The history is provided by the patient and the EMS personnel. No  was used. Review of Systems   Constitutional:  Negative for fever. Musculoskeletal:  Positive for arthralgias and joint swelling. Skin:  Positive for color change and wound. Neurological:  Negative for dizziness, light-headedness and headaches. All other systems reviewed and are negative.     Past Medical History:   Diagnosis Date    Benign paroxysmal positional vertigo     Breast cancer ([address] 12/2010    Cancer ([address] 2010    lt breast    Cancer ([address] 1988    colon    Chronic pain     Chronic renal disease, stage III [facility]) [519889] 6/29/2022    Diabetic polyneuropathy associated with type 1 diabetes mellitus ([address] 1/13/2020    Gastroparesis     GERD (gastroesophageal reflux disease)     medicaton controlled    Glaucoma     Hiatal hernia     HLD (hyperlipidemia)     Hypertension     medication controlled    Neuropathy, diabetic ([address]     Osteoporosis, unspecified     Personal history of malignant neoplasm of breast     Retinopathy due to secondary diabetes ([address]     Thyroid disease     medication controlled    Type 1 diabetes ([address] 5years of age    insulin, Hemoglobin A1c 6.0 on 7/25/18. Unspecified hereditary and idiopathic peripheral neuropathy     Unspecified hypothyroidism         Past Surgical History:   Procedure Laterality Date    COLONOSCOPY  2009    COLONOSCOPY N/A 8/21/2018    COLONOSCOPY  BMI 25 performed by Oxana Romero DO at [address] Right     eye stent for gluacoma     HYSTERECTOMY, TOTAL ABDOMINAL (CERVIX REMOVED)  1990    vaginal for dysplasia    MASTECTOMY Left 12/02/2010    Radical mestectomy    THYROIDECTOMY  1986    goiter/graves disease    TOTAL COLECTOMY  1988    Colon ca    TUBAL LIGATION  1970\"s    due to diabetes    VITRECTOMY Right 2000    hemmorrhage        Family History   Problem Relation Age of Onset    Heart Disease Father     Diabetes Father         Type II    Heart Attack Father 48    Atrial Fibrillation Mother     No Known Problems Sister     Thyroid Disease Sister         multinodular goiter    Gout Brother     Colon Cancer Paternal Grandmother     Breast Cancer Mother         Social History     Socioeconomic History    Marital status:       Spouse name: None    Number of children: None    Years of education: None    Highest education level: None   Tobacco Use    Smoking status: Never    Smokeless tobacco: Never   Substance and Sexual Activity    Alcohol use: No    Drug use: Yes     Types: OTC, Prescription     Social Determinants of Health     Physical Activity: Inactive    Days of Exercise per Week: 0 days    Minutes of Exercise per Session: 0 min         Lisinopril, Mirabegron, and Buspirone     Previous Medications    ALPHA-LIPOIC ACID 600 MG CAPS    Take 1 tablet by mouth 2 times daily    B-D ULTRAFINE III SHORT PEN 31G X 8 MM MISC    USE 1 PEN NEEDLE TO INJECT INSULIN FIVE TIMES DAILY    CALCIUM CARBONATE-VITAMIN D (CALCIUM-VITAMIN D) 600-125 MG-UNIT TABS    Take 1 tablet by mouth 2 times daily    CARBOXYMETHYLCELLULOSE SODIUM 0.25 % SOLN    Apply 1 drop to eye daily as needed (glaucoma) CONTACT LENS CARE PRODUCTS (THERATEARS) MISC    Take 1 tablet by mouth 3 times daily    GLUCAGON (Almon Robin 2-PACK) 1 MG/0.2ML SOAJ    Inject 1 mg into the skin as needed    HUMALOG KWIKPEN 100 UNIT/ML SOPN    Inject into the skin 2 units before meals plus correction scale 2/50>200, max daily dose 30 units    HYDROCORTISONE 2.5 % CREAM    APPLY TOPICALLY TO FACE TWICE DAILY AS NEEDED FOR ITCHING    INSULIN ASPART (NOVOLOG FLEXPEN) 100 UNIT/ML INJECTION PEN    Inject into the skin 2 units before meals plus correction scale 2/50>200, max daily dose 30 units    INSULIN GLARGINE (LANTUS SOLOSTAR) 100 UNIT/ML INJECTION PEN    Inject 30 Units into the skin nightly    KETOCONAZOLE (NIZORAL) 2 % SHAMPOO    USE SHAMPOO TO WASH SCALP 2-3 TIMES A WEEK, LET SIT 15 MINUTES PRIOR TO RINSING    LEVOTHYROXINE (SYNTHROID) 112 MCG TABLET    Take 112 mcg by mouth every morning (before breakfast)    LORATADINE (CLARITIN) 10 MG TABLET    Take 10 mg by mouth daily as needed    LOSARTAN (COZAAR) 100 MG TABLET    Take 100 mg by mouth daily    LUTEIN-ZEAXANTHIN 20-1 MG CAPS    Take 25 mg by mouth daily    METHYLCOBALAMIN (METHYL B-12 PO)    Take 1,000 mcg by mouth daily. NYSTATIN (MYCOSTATIN) 075472 UNIT/GM POWDER    Apply topically 4 times daily    ONDANSETRON (ZOFRAN-ODT) 8 MG TBDP DISINTEGRATING TABLET    Take 8 mg by mouth every 8 hours as needed for Nausea or Vomiting    PANTOPRAZOLE (PROTONIX) 40 MG TABLET    Take 40 mg by mouth daily    PATADAY 0.2 % SOLN OPHTHALMIC SOLUTION    INSTILL 1 DROP EVERY DAY INTO BOTH EYES    PILOCARPINE (PILOCAR) 1 % OPHTHALMIC SOLUTION    INSTILL 1 DROP INTO THE LEFT EYE TWICE DAILY    SENNA-DOCUSATE (PERICOLACE) 8.6-50 MG PER TABLET    Take 1 tablet by mouth daily as needed    TRAVOPROST, BAK FREE, (TRAVATAN Z) 0.004 % SOLN OPHTHALMIC SOLUTION    Place 1 drop into both eyes nightly    TURMERIC PO    Take 700 mg by mouth daily        Vitals signs and nursing note reviewed.    Patient Vitals for the past 4 hrs:   Temp Pulse Resp BP SpO2   10/01/22 1346 98.2 °F (36.8 °C) (!) 105 18 (!) 188/83 100 %          Physical Exam  Vitals and nursing note reviewed. Constitutional:       General: She is not in acute distress. Appearance: Normal appearance. She is normal weight. She is not ill-appearing or toxic-appearing. Comments: Generally well-appearing, alert and oriented x4. No acute distress, speaks in clear, fluid sentences. HENT:      Head: Normocephalic and atraumatic. Right Ear: External ear normal.      Left Ear: External ear normal.      Nose: Nose normal.      Mouth/Throat:      Mouth: Mucous membranes are moist.   Eyes:      General: No scleral icterus. Right eye: No discharge. Left eye: No discharge. Extraocular Movements: Extraocular movements intact. Cardiovascular:      Rate and Rhythm: Normal rate and regular rhythm. Pulses: Normal pulses. Heart sounds: Normal heart sounds. Pulmonary:      Effort: Pulmonary effort is normal. No tachypnea, bradypnea, accessory muscle usage, prolonged expiration or respiratory distress. Breath sounds: Normal breath sounds and air entry. No stridor. No decreased breath sounds, wheezing, rhonchi or rales. Abdominal:      General: Abdomen is flat. There is no distension. Palpations: There is no mass. Tenderness: There is no abdominal tenderness. There is no right CVA tenderness, left CVA tenderness, guarding or rebound. Negative signs include Roman's sign and McBurney's sign. Hernia: No hernia is present. Musculoskeletal:         General: No swelling, tenderness or deformity. Normal range of motion. Cervical back: Normal range of motion. Comments: Left lower extremity is evaluated on exam and reveals significant swelling to the ankle with tightness and ecchymosis noted on exam.  I cannot palpate dorsalis pedis pulses as well as anterior tibial pulses.   Sensation in the toes is intact and brisk capillary refill is present. Skin:     General: Skin is warm. Capillary Refill: Capillary refill takes less than 2 seconds. Neurological:      General: No focal deficit present. Mental Status: She is alert. Psychiatric:         Mood and Affect: Mood normal.        Ortho Injury    Date/Time: 10/1/2022 4:02 PM  Performed by: [address] [facility]  Authorized by: [address] [facility]   Consent: Verbal consent obtained. Consent given by: patient  Patient understanding: patient states understanding of the procedure being performed  Site marked: the operative site was marked  Imaging studies: imaging studies available  Patient identity confirmed: verbally with patient  Injury location: ankle  Location details: left ankle  Injury type: fracture  Pre-procedure distal perfusion: normal  Pre-procedure neurological function: normal  Pre-procedure range of motion: reduced    Anesthesia:  Local anesthesia used: no    Sedation:  Patient sedated: no    Immobilization: splint  Splint type: short leg (Short leg posterior with stirrup)  Supplies used: Ortho-Glass  Post-procedure distal perfusion: normal  Post-procedure neurological function: normal  Post-procedure range of motion: unchanged  Patient tolerance: patient tolerated the procedure well with no immediate complications        Results for orders placed or performed during the hospital encounter of 10/01/22   XR ANKLE LEFT (MIN 3 VIEWS)    Narrative    History: Fall with left ankle pain and swelling    EXAM: 3 views left ankle    FINDINGS: There are fractures of the medial and lateral malleoli. There is  medial and lateral subcutaneous soft tissue edema. Degenerative change of the  midfoot noted. Impression    Fractures of the medial and lateral malleoli. XR ANKLE LEFT (MIN 3 VIEWS)   Final Result   Fractures of the medial and lateral malleoli. Voice dictation software was used during the making of this note. This software is not perfect and grammatical and other typographical errors may be present. This note has not been completely proofread for errors.         [address] [facility]  10/01/22 4898

## 2022-10-01 NOTE — H&P
Hospitalist History and Physical   Admit Date:  10/1/2022  1:42 PM   Name:  Nadeem Cosby   Age:  78 y.o. Sex:  female  :  1955   MRN:  [id number]   Room:  ER30/30    Presenting Complaint: Ankle Pain     Reason(s) for Admission: Nondisp bimalleol fx l low leg, init for opn fx type I/2 [S82.845B]     History of Present Illness:   Nadeem Cosby is a 78 y.o. female with medical history of HTN, DM Type 1 since 48 + years, GERD, DM Polyneuropathy, CKD stage 3, Glaucoma, Hypothyroidism, Dry eyes and gait imbalance since sometime now. The pt states that she was in her USOH until she went to get out of bed last night to go to the bathroom. The pt states that she lost her balance and fell onto her Left leg. She didn't think much about it and went to sleep. The pt states that she awoke this am and noted an inability to bear weight on her L Leg. He sister who is also present at the [facility] notes that her gait has been problematic with a noted shuffling since the last few months. The pt states that she is not a vegan and has been on B12 replacement for some time. The pt came to the ED where she was seen and had xrays of the LLE which confirmed Bimalleolar fractures. The pt denies any fevers, chills, nausea, vomiting, SOB, cough, CP or HA. The pt denies any prodromal sx and specifically denies any dizziness or LOC. The pt is now seen for further eval at the [facility] request.      Review of Systems:  10 systems reviewed and negative except as noted in HPI. Assessment & Plan:     Principal Problem:    Nondisp bimalleol fx l low leg, init for opn fx type I/2          IMP:    1.) S/P Fall with LLE Bimalleolar Fx - ? Etiol, + wide based gait with ? PN contributing v Severe B12 def. ? CVA v Cerebellar path. Ortho eval. Ck met panel with B12 and folate. 2.) Gait ataxia - See above, ? Cerebellar v PN v B12 def. Ck CT head, met panel.  Ortho eval. ? MRI    3.) DM1 - SSI, Lantus, ck A1C, pt with Dexcom    4.) HTN - ck Orthostat, 5.) Hypothyroidism - ck TSH, FT4, resume T4 replacement    6.) Glaucoma/Xerophthalmia - resume gtts    7.) GERD - resume PPI    8.) Severe Macrocytosis - ? B12 def, repeat CBC with B12 and folate. 9.) CKD Stage 3    10.) DM PN    11.) Anxiety/?Depression    12.) Debility      Plan:    Admit/See orders  Ck stat labs as none were ordered from ED  Ck A1C  Start SSI, resume lantus  Ck CT head  Ck Orthostat  Replace T4  Ck Met panel  Orthopaedic consult  Neuro eval in am.    Case d/w pt and sister at the [facility]        Anticipated discharge needs:     STR? Diet: No diet orders on file  VTE ppx: LMWH  Code status: Prior    Hospital Problems:  Principal Problem:    Nondisp bimalleol fx l low leg, init for opn fx type I/2  Resolved Problems:    * No resolved hospital problems. *       Past History:     Past Medical History:   Diagnosis Date    Benign paroxysmal positional vertigo     Breast cancer ([facility] 12/2010    Cancer ([facility] 2010    lt breast    Cancer ([address] 1988    colon    Chronic pain     Chronic renal disease, stage III [facility]) [139136] 6/29/2022    Diabetic polyneuropathy associated with type 1 diabetes mellitus ([address] 1/13/2020    Gastroparesis     GERD (gastroesophageal reflux disease)     medicaton controlled    Glaucoma     Hiatal hernia     HLD (hyperlipidemia)     Hypertension     medication controlled    Neuropathy, diabetic ([address]     Osteoporosis, unspecified     Personal history of malignant neoplasm of breast     Retinopathy due to secondary diabetes ([address]     Thyroid disease     medication controlled    Type 1 diabetes ([address] 5years of age    insulin, Hemoglobin A1c 6.0 on 7/25/18.      Unspecified hereditary and idiopathic peripheral neuropathy     Unspecified hypothyroidism        Past Surgical History:   Procedure Laterality Date    COLONOSCOPY  2009    COLONOSCOPY N/A 8/21/2018    COLONOSCOPY  BMI 25 performed by Roxy Quintero DO at [address] [place] [address] Right     eye stent for gluacoma HYSTERECTOMY, TOTAL ABDOMINAL (CERVIX REMOVED)  1990    vaginal for dysplasia    MASTECTOMY Left 12/02/2010    Radical mestectomy    THYROIDECTOMY  1986    goiter/graves disease    TOTAL COLECTOMY  1988    Colon ca    TUBAL LIGATION  1970\"s    due to diabetes    VITRECTOMY Right 2000    hemmorrhage        Social History     Tobacco Use    Smoking status: Never    Smokeless tobacco: Never   Substance Use Topics    Alcohol use: No      Social History     Substance and Sexual Activity   Drug Use Yes    Types: OTC, Prescription     Family History   Problem Relation Age of Onset    Heart Disease Father     Diabetes Father         Type II    Heart Attack Father 48    Atrial Fibrillation Mother     No Known Problems Sister     Thyroid Disease Sister         multinodular goiter    Gout Brother     Colon Cancer Paternal Grandmother     Breast Cancer Mother         Immunization History   Administered Date(s) Administered    COVID-19, PFIZER PURPLE top, DILUTE for use, (age 15 y+), 30mcg/0.3mL 01/28/2021, 02/16/2021, 11/16/2021    Influenza Virus Vaccine 09/30/2013, 10/01/2015, 10/01/2017, 10/03/2018    Influenza, FLUAD, (age 72 y+), Adjuvanted, 0.5mL 10/07/2020, 09/28/2021    Influenza, FLUCELVAX, (age 10 mo+), MDCK, PF, 0.5mL 10/06/2019    Pneumococcal Polysaccharide ([id number]) 11/09/2020    Pneumococcal Vaccine 11/19/2013    Tdap (Boostrix, Adacel) 11/07/2016     Allergies   Allergen Reactions    Lisinopril Other (See Comments)    Mirabegron Hives    Buspirone Palpitations     Prior to Admit Medications:  Current Outpatient Medications   Medication Instructions    Alpha-Lipoic Acid 600 MG CAPS 1 tablet, Oral, 2 TIMES DAILY    B-D ULTRAFINE III SHORT PEN 31G X 8 MM MISC USE 1 PEN NEEDLE TO INJECT INSULIN FIVE TIMES DAILY    Calcium Carbonate-Vitamin D (CALCIUM-VITAMIN D) 600-125 MG-UNIT TABS 1 tablet, Oral, 2 TIMES DAILY    Carboxymethylcellulose Sodium 0.25 % SOLN 1 drop, Ophthalmic, DAILY PRN    Contact Lens Care Products (THERATEARS) MISC 1 tablet, Oral, 3 TIMES DAILY    Gvoke HypoPen 2-Pack 1 mg, SubCUTAneous, PRN    HUMALOG KWIKPEN 100 UNIT/ML SOPN Inject into the skin 2 units before meals plus correction scale 2/50>200, max daily dose 30 units    hydrocortisone 2.5 % cream APPLY TOPICALLY TO FACE TWICE DAILY AS NEEDED FOR ITCHING    insulin aspart (NOVOLOG FLEXPEN) 100 UNIT/ML injection pen SubCUTAneous, 2 units before meals plus correction scale 2/50>200, max daily dose 30 units    ketoconazole (NIZORAL) 2 % shampoo USE SHAMPOO TO WASH SCALP 2-3 TIMES A WEEK, LET SIT 15 MINUTES PRIOR TO RINSING    Lantus SoloStar 30 Units, SubCUTAneous, NIGHTLY    levothyroxine (SYNTHROID) 112 mcg, Oral, DAILY BEFORE BREAKFAST    loratadine (CLARITIN) 10 mg, Oral, DAILY PRN    losartan (COZAAR) 100 mg, Oral, DAILY    Lutein-Zeaxanthin 20-1 MG CAPS 25 mg, Oral, DAILY    Methylcobalamin (METHYL B-12 PO) 1,000 mcg, Oral, DAILY    nystatin (MYCOSTATIN) 863156 UNIT/GM powder Topical, 4 TIMES DAILY    ondansetron (ZOFRAN-ODT) 8 mg, Oral, EVERY 8 HOURS PRN    pantoprazole (PROTONIX) 40 mg, Oral, DAILY    PATADAY 0.2 % SOLN ophthalmic solution INSTILL 1 DROP EVERY DAY INTO BOTH EYES    pilocarpine (PILOCAR) 1 % ophthalmic solution INSTILL 1 DROP INTO THE LEFT EYE TWICE DAILY    senna-docusate (PERICOLACE) 8.6-50 MG per tablet 1 tablet, Oral, DAILY PRN    Travoprost, BAK Free, (TRAVATAN Z) 0.004 % SOLN ophthalmic solution 1 drop, Both Eyes, NIGHTLY    TURMERIC  mg, Oral, DAILY         Objective:   Patient Vitals for the past 24 hrs:   Temp Pulse Resp BP SpO2   10/01/22 1500 -- -- -- (!) 168/72 98 %   10/01/22 1430 -- -- -- (!) 164/69 --   10/01/22 1400 -- -- -- (!) 156/56 --   10/01/22 1353 -- -- -- -- 99 %   10/01/22 1346 98.2 °F (36.8 °C) (!) 105 18 (!) 188/83 100 %       Oxygen Therapy  SpO2: 98 %  Pulse Oximeter Device Mode: Continuous  O2 Device: None (Room air)    Estimated body mass index is 24.78 kg/m² as calculated from the following:    Height as of this encounter: 5' 8\" (1.727 m). Weight as of this encounter: 163 lb (73.9 kg). No intake or output data in the 24 hours ending 10/01/22 1650      Physical Exam:  Pt seen and examined 10/1/2022    Blood pressure (!) 168/72, pulse (!) 105, temperature 98.2 °F (36.8 °C), temperature source Oral, resp. rate 18, height 5' 8\" (1.727 m), weight 163 lb (73.9 kg), SpO2 98 %. General:    Ill appearing  Head:  Normocephalic, atraumatic  Eyes:  Sclerae appear normal.  Pupils equally round. ENT:  Nares appear normal, no drainage. Dry oral mucosa  Neck:  No restricted ROM. Trachea midline   CV:   Nml S1, S2, RRR. No m/r/g. No jugular venous distension. Lungs:   Diminished bases. No wheezing, rhonchi, or rales. Symmetric expansion. Abdomen: Bowel sounds present. Soft, obese, nontender, nondistended. Extremities: No cyanosis or clubbing. Dec ROM with casting LLE  Skin:     No rashes and normal coloration. Warm and dry. Neuro:  CN II-XII grossly intact. Sensation dec  A&Ox3  Psych:  Anxious     I have personally reviewed labs and tests showing:  Recent Labs:  No results found for this or any previous visit (from the past 24 hour(s)). I have personally reviewed imaging studies showing:  XR ANKLE LEFT (MIN 3 VIEWS)    Result Date: 10/1/2022  History: Fall with left ankle pain and swelling EXAM: 3 views left ankle FINDINGS: There are fractures of the medial and lateral malleoli. There is medial and lateral subcutaneous soft tissue edema. Degenerative change of the midfoot noted. Fractures of the medial and lateral malleoli. Echocardiogram:  No results found for this or any previous visit.         Orders Placed This Encounter   Medications    ketorolac (TORADOL) injection 30 mg    HYDROmorphone HCl PF (DILAUDID) injection 1 mg    ondansetron (ZOFRAN) injection 4 mg         Signed:    Rodney Nicholson MD, [address]  Internal Medicine - Hospitalist    Part of this note may have been written by using a voice dictation software. The note has been proof read but may still contain some grammatical/other typographical errors.

## 2022-10-01 NOTE — ED NOTES
TRANSFER - OUT REPORT:    Verbal report given to Vicki Ricketts RN on Ranjeet Luther  being transferred to  [phone] for routine progression of patient care       Report consisted of patient's Situation, Background, Assessment and   Recommendations(SBAR). Information from the following report(s) ED Encounter Summary, ED SBAR, Intake/Output, MAR, and Recent Results was reviewed with the receiving nurse. Lines:   Peripheral IV 10/01/22 Right;Ventral Forearm (Active)        Opportunity for questions and clarification was provided.       Patient transported with:  Lyssa Johnson RN  10/01/22 4075

## 2022-10-02 ENCOUNTER — APPOINTMENT (OUTPATIENT)
Dept: GENERAL RADIOLOGY | Age: 67
DRG: 494 | End: 2022-10-02
Payer: MEDICARE

## 2022-10-02 PROBLEM — E11.21 TYPE 2 DIABETES WITH NEPHROPATHY (HCC): Status: ACTIVE | Noted: 2020-10-07

## 2022-10-02 LAB
ALBUMIN SERPL-MCNC: 3.2 G/DL (ref 3.2–4.6)
ALBUMIN/GLOB SERPL: 1.1 {RATIO} (ref 1.2–3.5)
ALP SERPL-CCNC: 62 U/L (ref 50–136)
ALT SERPL-CCNC: 20 U/L (ref 12–65)
ANION GAP SERPL CALC-SCNC: 4 MMOL/L (ref 4–13)
APTT PPP: 29.7 SEC (ref 24.1–35.1)
AST SERPL-CCNC: 22 U/L (ref 15–37)
BASOPHILS # BLD: 0 K/UL (ref 0–0.2)
BASOPHILS NFR BLD: 0 % (ref 0–2)
BILIRUB DIRECT SERPL-MCNC: 0.3 MG/DL
BILIRUB SERPL-MCNC: 1 MG/DL (ref 0.2–1.1)
BUN SERPL-MCNC: 22 MG/DL (ref 8–23)
CALCIUM SERPL-MCNC: 8.7 MG/DL (ref 8.3–10.4)
CHLORIDE SERPL-SCNC: 100 MMOL/L (ref 101–110)
CHOLEST SERPL-MCNC: 237 MG/DL
CO2 SERPL-SCNC: 30 MMOL/L (ref 21–32)
CREAT SERPL-MCNC: 0.8 MG/DL (ref 0.6–1)
DIFFERENTIAL METHOD BLD: ABNORMAL
EOSINOPHIL # BLD: 0 K/UL (ref 0–0.8)
EOSINOPHIL NFR BLD: 0 % (ref 0.5–7.8)
ERYTHROCYTE [DISTWIDTH] IN BLOOD BY AUTOMATED COUNT: 13.2 % (ref 11.9–14.6)
GLOBULIN SER CALC-MCNC: 3 G/DL (ref 2.3–3.5)
GLUCOSE BLD STRIP.AUTO-MCNC: 194 MG/DL (ref 65–100)
GLUCOSE SERPL-MCNC: 163 MG/DL (ref 65–100)
HCT VFR BLD AUTO: 34.3 % (ref 35.8–46.3)
HDLC SERPL-MCNC: 98 MG/DL (ref 40–60)
HDLC SERPL: 2.4 {RATIO}
HGB BLD-MCNC: 11.3 G/DL (ref 11.7–15.4)
IMM GRANULOCYTES # BLD AUTO: 0 K/UL (ref 0–0.5)
IMM GRANULOCYTES NFR BLD AUTO: 0 % (ref 0–5)
INR PPP: 0.9
LDLC SERPL CALC-MCNC: 118.8 MG/DL
LYMPHOCYTES # BLD: 1 K/UL (ref 0.5–4.6)
LYMPHOCYTES NFR BLD: 14 % (ref 13–44)
MCH RBC QN AUTO: 32.7 PG (ref 26.1–32.9)
MCHC RBC AUTO-ENTMCNC: 32.9 G/DL (ref 31.4–35)
MCV RBC AUTO: 99.1 FL (ref 79.6–97.8)
MONOCYTES # BLD: 0.8 K/UL (ref 0.1–1.3)
MONOCYTES NFR BLD: 11 % (ref 4–12)
NEUTS SEG # BLD: 5.2 K/UL (ref 1.7–8.2)
NEUTS SEG NFR BLD: 75 % (ref 43–78)
NRBC # BLD: 0 K/UL (ref 0–0.2)
PLATELET # BLD AUTO: 265 K/UL (ref 150–450)
PMV BLD AUTO: 9.6 FL (ref 9.4–12.3)
POTASSIUM SERPL-SCNC: 4 MMOL/L (ref 3.5–5.1)
PROT SERPL-MCNC: 6.2 G/DL (ref 6.3–8.2)
PROTHROMBIN TIME: 12.7 SEC (ref 12.6–14.5)
RBC # BLD AUTO: 3.46 M/UL (ref 4.05–5.2)
SERVICE CMNT-IMP: ABNORMAL
SODIUM SERPL-SCNC: 134 MMOL/L (ref 136–145)
TRIGL SERPL-MCNC: 101 MG/DL (ref 35–150)
TROPONIN I SERPL HS-MCNC: 12.6 PG/ML (ref 0–14)
TROPONIN I SERPL HS-MCNC: 13.9 PG/ML (ref 0–14)
VLDLC SERPL CALC-MCNC: 20.2 MG/DL (ref 6–23)
WBC # BLD AUTO: 7 K/UL (ref 4.3–11.1)

## 2022-10-02 PROCEDURE — 85730 THROMBOPLASTIN TIME PARTIAL: CPT

## 2022-10-02 PROCEDURE — 85610 PROTHROMBIN TIME: CPT

## 2022-10-02 PROCEDURE — 1100000003 HC PRIVATE W/ TELEMETRY

## 2022-10-02 PROCEDURE — 2500000003 HC RX 250 WO HCPCS: Performed by: INTERNAL MEDICINE

## 2022-10-02 PROCEDURE — 2500000003 HC RX 250 WO HCPCS: Performed by: NURSE PRACTITIONER

## 2022-10-02 PROCEDURE — 82248 BILIRUBIN DIRECT: CPT

## 2022-10-02 PROCEDURE — 2580000003 HC RX 258: Performed by: INTERNAL MEDICINE

## 2022-10-02 PROCEDURE — 80053 COMPREHEN METABOLIC PANEL: CPT

## 2022-10-02 PROCEDURE — 82962 GLUCOSE BLOOD TEST: CPT

## 2022-10-02 PROCEDURE — 36415 COLL VENOUS BLD VENIPUNCTURE: CPT

## 2022-10-02 PROCEDURE — 99222 1ST HOSP IP/OBS MODERATE 55: CPT | Performed by: PSYCHIATRY & NEUROLOGY

## 2022-10-02 PROCEDURE — 71045 X-RAY EXAM CHEST 1 VIEW: CPT

## 2022-10-02 PROCEDURE — 85025 COMPLETE CBC W/AUTO DIFF WBC: CPT

## 2022-10-02 PROCEDURE — 84484 ASSAY OF TROPONIN QUANT: CPT

## 2022-10-02 PROCEDURE — 6370000000 HC RX 637 (ALT 250 FOR IP): Performed by: INTERNAL MEDICINE

## 2022-10-02 PROCEDURE — 80061 LIPID PANEL: CPT

## 2022-10-02 PROCEDURE — 6360000002 HC RX W HCPCS: Performed by: INTERNAL MEDICINE

## 2022-10-02 RX ADMIN — PANTOPRAZOLE SODIUM 40 MG: 40 TABLET, DELAYED RELEASE ORAL at 09:17

## 2022-10-02 RX ADMIN — INSULIN LISPRO 2 UNITS: 100 INJECTION, SOLUTION INTRAVENOUS; SUBCUTANEOUS at 21:59

## 2022-10-02 RX ADMIN — ANTI-FUNGAL POWDER MICONAZOLE NITRATE TALC FREE: 1.42 POWDER TOPICAL at 20:26

## 2022-10-02 RX ADMIN — ENOXAPARIN SODIUM 40 MG: 100 INJECTION SUBCUTANEOUS at 09:17

## 2022-10-02 RX ADMIN — INSULIN LISPRO 2 UNITS: 100 INJECTION, SOLUTION INTRAVENOUS; SUBCUTANEOUS at 16:03

## 2022-10-02 RX ADMIN — HYDROCODONE BITARTRATE AND ACETAMINOPHEN 1 TABLET: 7.5; 325 TABLET ORAL at 02:21

## 2022-10-02 RX ADMIN — ANTI-FUNGAL POWDER MICONAZOLE NITRATE TALC FREE: 1.42 POWDER TOPICAL at 09:19

## 2022-10-02 RX ADMIN — Medication 1 TABLET: at 09:19

## 2022-10-02 RX ADMIN — INSULIN LISPRO 2 UNITS: 100 INJECTION, SOLUTION INTRAVENOUS; SUBCUTANEOUS at 11:38

## 2022-10-02 RX ADMIN — INSULIN GLARGINE 30 UNITS: 100 INJECTION, SOLUTION SUBCUTANEOUS at 20:23

## 2022-10-02 RX ADMIN — SODIUM CHLORIDE, PRESERVATIVE FREE 5 ML: 5 INJECTION INTRAVENOUS at 09:19

## 2022-10-02 RX ADMIN — HYDROCODONE BITARTRATE AND ACETAMINOPHEN 1 TABLET: 7.5; 325 TABLET ORAL at 09:40

## 2022-10-02 RX ADMIN — ONDANSETRON 8 MG: 4 TABLET, ORALLY DISINTEGRATING ORAL at 20:25

## 2022-10-02 RX ADMIN — SODIUM CHLORIDE, PRESERVATIVE FREE 10 ML: 5 INJECTION INTRAVENOUS at 20:26

## 2022-10-02 RX ADMIN — Medication 1 TABLET: at 20:25

## 2022-10-02 RX ADMIN — LEVOTHYROXINE SODIUM 112 MCG: 0.11 TABLET ORAL at 07:04

## 2022-10-02 RX ADMIN — LOSARTAN POTASSIUM 100 MG: 50 TABLET, FILM COATED ORAL at 09:17

## 2022-10-02 RX ADMIN — LATANOPROST 1 DROP: 50 SOLUTION OPHTHALMIC at 20:26

## 2022-10-02 RX ADMIN — INSULIN LISPRO 2 UNITS: 100 INJECTION, SOLUTION INTRAVENOUS; SUBCUTANEOUS at 07:02

## 2022-10-02 RX ADMIN — ONDANSETRON 8 MG: 4 TABLET, ORALLY DISINTEGRATING ORAL at 11:10

## 2022-10-02 RX ADMIN — MORPHINE SULFATE 4 MG: 4 INJECTION INTRAVENOUS at 20:25

## 2022-10-02 RX ADMIN — ALPRAZOLAM 0.25 MG: 0.5 TABLET ORAL at 11:10

## 2022-10-02 ASSESSMENT — PAIN DESCRIPTION - ORIENTATION
ORIENTATION: LEFT

## 2022-10-02 ASSESSMENT — PAIN SCALES - GENERAL
PAINLEVEL_OUTOF10: 0
PAINLEVEL_OUTOF10: 6
PAINLEVEL_OUTOF10: 8
PAINLEVEL_OUTOF10: 10
PAINLEVEL_OUTOF10: 0

## 2022-10-02 ASSESSMENT — PAIN DESCRIPTION - DESCRIPTORS
DESCRIPTORS: ACHING

## 2022-10-02 ASSESSMENT — PAIN DESCRIPTION - PAIN TYPE
TYPE: ACUTE PAIN

## 2022-10-02 ASSESSMENT — PAIN DESCRIPTION - FREQUENCY
FREQUENCY: INTERMITTENT

## 2022-10-02 ASSESSMENT — PAIN - FUNCTIONAL ASSESSMENT
PAIN_FUNCTIONAL_ASSESSMENT: ACTIVITIES ARE NOT PREVENTED
PAIN_FUNCTIONAL_ASSESSMENT: PREVENTS OR INTERFERES SOME ACTIVE ACTIVITIES AND ADLS
PAIN_FUNCTIONAL_ASSESSMENT: PREVENTS OR INTERFERES SOME ACTIVE ACTIVITIES AND ADLS

## 2022-10-02 ASSESSMENT — PAIN DESCRIPTION - LOCATION
LOCATION: LEG
LOCATION: FOOT;LEG
LOCATION: LEG;FOOT

## 2022-10-02 ASSESSMENT — PAIN DESCRIPTION - ONSET
ONSET: GRADUAL
ONSET: ON-GOING
ONSET: GRADUAL

## 2022-10-02 NOTE — PROGRESS NOTES
Hospitalist Progress Note   Admit Date:  10/1/2022  1:42 PM   Name:  Ranjeet Luther   Age:  78 y.o. Sex:  female  :  1955   MRN:  [id number]   Room:  65    Presenting Complaint: Ankle Pain     Reason(s) for Admission: Closed bimalleolar fracture of left ankle, initial encounter [S20.095U]  Nondisp bimalleol fx l low leg, init for opn fx type I/2 [facility]     Hospital Course:    78 y.o. female with medical history of HTN, DM Type 1 since 50 + years, GERD, DM Polyneuropathy, CKD stage 3, Glaucoma, Hypothyroidism, Dry eyes and gait imbalance admitted 10/1 with LLE bimalleolar fx. Also concerns of ongoing shuffle gait, seen by neurology with recs for daily baby ASA and extensive PT/OT. Ortho following, waiting on Dr. Micky Garrett for further recs regarding surgery. Subjective & 24hr Events (10/02/22): A&O x3, good sensation. Color to left foot. Splint intact. Assessment & Plan:     Principal Problem:    Nondisp bimalleol fx l low leg, init for opn fx type I/2  Plan: per ortho, likely  need surgical intervention  Splint intact  Circulatory checks per unit standards  **per neuro recs, ASA 81 mg to be initiated post surgery for stroke prevention    Active Problems:    Chronic renal disease, stage III ([facility] [352695]  Plan: Cr stable at 0.80  Continue to trend      Post-surgical hypothyroidism  Plan: Synthroid home dose resumed      Gastroparesis  Plan: noted  Observe      Essential hypertension  Plan: controlled with ARB      Type 1 diabetes mellitus ([address]  Plan: Lantus 30 units nightly  SSI      HLD (hyperlipidemia)  Plan: lipid panel pending  Not on statin currently, if lipid abnormal start statin therapy    Polyneuropathy:  PT/OT eval  PPD ordered      Anticipated discharge needs:      Pending PT/OT eval.     Diet:  ADULT DIET;  Regular; 5 carb choices (75 gm/meal)  DVT PPx: Lovenox SQ  Code status: Full Code    Hospital Problems:  Principal Problem:    Nondisp bimalleol fx l low leg, init for opn fx type I/2  Active Problems:    Chronic renal disease, stage III ([facility] [address] [523519]    Post-surgical hypothyroidism    Gastroparesis    Essential hypertension    Type 1 diabetes mellitus ([facility]    HLD (hyperlipidemia)  Resolved Problems:    * No resolved hospital problems. *      Objective:   Patient Vitals for the past 24 hrs:   Temp Pulse Resp BP SpO2   10/02/22 1129 97.5 °F (36.4 °C) 65 18 (!) 123/58 96 %   10/02/22 0940 -- -- 20 -- --   10/02/22 0738 97.7 °F (36.5 °C) 82 16 131/61 95 %   10/02/22 0414 97.3 °F (36.3 °C) 59 16 (!) 132/57 97 %   10/02/22 0221 -- -- 16 -- --   10/02/22 0052 97.5 °F (36.4 °C) 79 18 135/63 98 %   10/01/22 1920 98.8 °F (37.1 °C) 92 18 (!) 154/69 95 %   10/01/22 1715 -- 82 16 (!) 146/60 93 %   10/01/22 1700 -- 81 20 (!) 146/60 95 %   10/01/22 1645 -- 83 21 (!) 137/59 94 %   10/01/22 1630 -- 82 14 (!) 141/57 91 %   10/01/22 1625 -- 85 21 -- 94 %   10/01/22 1616 -- 87 15 -- 95 %   10/01/22 1615 -- 93 -- (!) 152/67 95 %   10/01/22 1600 -- 92 23 (!) 134/59 93 %   10/01/22 1545 -- 96 24 (!) 155/63 94 %   10/01/22 1530 -- 93 -- (!) 152/63 95 %   10/01/22 1515 -- 91 -- (!) 147/54 --   10/01/22 1514 -- 89 -- -- 94 %   10/01/22 1500 -- -- -- (!) 168/72 98 %   10/01/22 1430 -- -- -- (!) 164/69 --   10/01/22 1400 -- -- -- (!) 156/56 --   10/01/22 1353 -- -- -- -- 99 %   10/01/22 1346 98.2 °F (36.8 °C) (!) 105 18 (!) 188/83 100 %       Oxygen Therapy  SpO2: 96 %  Pulse Oximeter Device Mode: Continuous  O2 Device: None (Room air)    Estimated body mass index is 24.78 kg/m² as calculated from the following:    Height as of this encounter: 5' 8\" (1.727 m). Weight as of this encounter: 163 lb (73.9 kg). Intake/Output Summary (Last 24 hours) at 10/2/2022 1140  Last data filed at 10/2/2022 1025  Gross per 24 hour   Intake --   Output 300 ml   Net -300 ml         Physical Exam:   Blood pressure (!) 123/58, pulse 65, temperature 97.5 °F (36.4 °C), temperature source Oral, resp.  rate 18, height 5' mmol/L    Glucose 149 (H) 65 - 100 mg/dL    BUN 20 8 - 23 MG/DL    Creatinine 0.80 0.6 - 1.0 MG/DL    GFR African American >60 >60 ml/min/1.73m2    GFR Non- >60 >60 ml/min/1.73m2    Calcium 8.9 8.3 - 10.4 MG/DL    Total Bilirubin 1.1 0.2 - 1.1 MG/DL    ALT 22 12 - 65 U/L    AST 27 15 - 37 U/L    Alk Phosphatase 65 50 - 136 U/L    Total Protein 6.7 6.3 - 8.2 g/dL    Albumin 3.7 3.2 - 4.6 g/dL    Globulin 3.0 2.3 - 3.5 g/dL    Albumin/Globulin Ratio 1.2 1.2 - 3.5     EKG 12 lead    Collection Time: 10/01/22  6:44 PM   Result Value Ref Range    Ventricular Rate 86 BPM    Atrial Rate 86 BPM    P-R Interval 142 ms    QRS Duration 82 ms    Q-T Interval 382 ms    QTc Calculation (Bazett) 457 ms    P Axis 60 degrees    R Axis 60 degrees    T Axis 83 degrees    Diagnosis Normal sinus rhythm    Troponin    Collection Time: 10/01/22  9:20 PM   Result Value Ref Range    Troponin, High Sensitivity 18.0 (H) 0 - 14 pg/mL   Sedimentation Rate    Collection Time: 10/01/22  9:20 PM   Result Value Ref Range    Sed Rate, Automated 6 0 - 30 mm/hr   Ammonia    Collection Time: 10/01/22  9:20 PM   Result Value Ref Range    Ammonia 35 (H) 11 - 32 UMOL/L   Vitamin B12    Collection Time: 10/01/22  9:20 PM   Result Value Ref Range    Vitamin B-12 629 193 - 986 pg/mL   Folate    Collection Time: 10/01/22  9:20 PM   Result Value Ref Range    Folate 10.6 3.1 - 17.5 ng/mL   Troponin    Collection Time: 10/02/22  1:27 AM   Result Value Ref Range    Troponin, High Sensitivity 13.9 0 - 14 pg/mL   Comprehensive Metabolic Panel w/ Reflex to MG    Collection Time: 10/02/22  4:45 AM   Result Value Ref Range    Sodium 134 (L) 136 - 145 mmol/L    Potassium 4.0 3.5 - 5.1 mmol/L    Chloride 100 (L) 101 - 110 mmol/L    CO2 30 21 - 32 mmol/L    Anion Gap 4 4 - 13 mmol/L    Glucose 163 (H) 65 - 100 mg/dL    BUN 22 8 - 23 MG/DL    Creatinine 0.80 0.6 - 1.0 MG/DL    GFR African American >60 >60 ml/min/1.73m2    GFR Non- >60 >60 ml/min/1.73m2    Calcium 8.7 8.3 - 10.4 MG/DL    Total Bilirubin 1.0 0.2 - 1.1 MG/DL    ALT 20 12 - 65 U/L    AST 22 15 - 37 U/L    Alk Phosphatase 62 50 - 136 U/L    Total Protein 6.2 (L) 6.3 - 8.2 g/dL    Albumin 3.2 3.2 - 4.6 g/dL    Globulin 3.0 2.3 - 3.5 g/dL    Albumin/Globulin Ratio 1.1 (L) 1.2 - 3.5     CBC with Auto Differential    Collection Time: 10/02/22  4:45 AM   Result Value Ref Range    WBC 7.0 4.3 - 11.1 K/uL    RBC 3.46 (L) 4.05 - 5.2 M/uL    Hemoglobin 11.3 (L) 11.7 - 15.4 g/dL    Hematocrit 34.3 (L) 35.8 - 46.3 %    MCV 99.1 (H) 79.6 - 97.8 FL    MCH 32.7 26.1 - 32.9 PG    MCHC 32.9 31.4 - 35.0 g/dL    RDW 13.2 11.9 - 14.6 %    Platelets 017 082 - 813 K/uL    MPV 9.6 9.4 - 12.3 FL    nRBC 0.00 0.0 - 0.2 K/uL    Differential Type AUTOMATED      Seg Neutrophils 75 43 - 78 %    Lymphocytes 14 13 - 44 %    Monocytes 11 4.0 - 12.0 %    Eosinophils % 0 (L) 0.5 - 7.8 %    Basophils 0 0.0 - 2.0 %    Immature Granulocytes 0 0.0 - 5.0 %    Segs Absolute 5.2 1.7 - 8.2 K/UL    Absolute Lymph # 1.0 0.5 - 4.6 K/UL    Absolute Mono # 0.8 0.1 - 1.3 K/UL    Absolute Eos # 0.0 0.0 - 0.8 K/UL    Basophils Absolute 0.0 0.0 - 0.2 K/UL    Absolute Immature Granulocyte 0.0 0.0 - 0.5 K/UL   Protime-INR    Collection Time: 10/02/22  4:45 AM   Result Value Ref Range    Protime 12.7 12.6 - 14.5 sec    INR 0.9     APTT    Collection Time: 10/02/22  4:45 AM   Result Value Ref Range    PTT 29.7 24.1 - 35.1 SEC   Bilirubin, Direct    Collection Time: 10/02/22  4:45 AM   Result Value Ref Range    Bilirubin, Direct 0.3 <0.4 MG/DL   Troponin    Collection Time: 10/02/22  4:45 AM   Result Value Ref Range    Troponin, High Sensitivity 12.6 0 - 14 pg/mL   Lipid Panel    Collection Time: 10/02/22  4:45 AM   Result Value Ref Range    Cholesterol, Total 237 (H) <200 MG/DL    Triglycerides 101 35 - 150 MG/DL    HDL 98 (H) 40 - 60 MG/DL    LDL Calculated 118.8 (H) <100 MG/DL    VLDL Cholesterol Calculated 20.2 6.0 - 23.0 MG/DL Chol/HDL Ratio 2.4         I have personally reviewed imaging studies showing: Other Studies:  XR CHEST PORTABLE   Final Result   No evidence of an acute intrathoracic process. Mild left basilar atelectasis. CT HEAD WO CONTRAST   Final Result   Chronic appearing changes without acute intracranial abnormality. XR ANKLE LEFT (MIN 3 VIEWS)   Final Result   Fractures of the medial and lateral malleoli.           Current Meds:  Current Facility-Administered Medications   Medication Dose Route Frequency    tuberculin injection 5 Units  5 Units IntraDERmal Once    oyster shell calcium w/D 500-200 MG-UNIT tablet 1 tablet  1 tablet Oral BID    carboxymethylcellulose (THERATEARS) 1 % ophthalmic gel 1 drop  1 drop Ophthalmic Daily PRN    insulin lispro (HUMALOG) injection vial 2 Units  2 Units SubCUTAneous 4x Daily AC & HS    insulin glargine (LANTUS) injection vial 30 Units  30 Units SubCUTAneous Nightly    levothyroxine (SYNTHROID) tablet 112 mcg  112 mcg Oral QAM AC    cetirizine (ZYRTEC) tablet 10 mg  10 mg Oral Daily PRN    losartan (COZAAR) tablet 100 mg  100 mg Oral Daily    miconazole (MICOTIN) 2 % powder   Topical BID    ondansetron (ZOFRAN-ODT) disintegrating tablet 8 mg  8 mg Oral Q8H PRN    pantoprazole (PROTONIX) tablet 40 mg  40 mg Oral Daily    ketotifen (ZADITOR) 0.025 % ophthalmic solution 1 drop  1 drop Both Eyes BID    pilocarpine (PILOCAR) 1 % ophthalmic solution 1 drop  1 drop Left Eye BID    sennosides-docusate sodium (SENOKOT-S) 8.6-50 MG tablet 1 tablet  1 tablet Oral Daily PRN    latanoprost (XALATAN) 0.005 % ophthalmic solution 1 drop  1 drop Both Eyes Nightly    sodium chloride flush 0.9 % injection 5-40 mL  5-40 mL IntraVENous 2 times per day    sodium chloride flush 0.9 % injection 5-40 mL  5-40 mL IntraVENous PRN    0.9 % sodium chloride infusion   IntraVENous PRN    enoxaparin (LOVENOX) injection 40 mg  40 mg SubCUTAneous Daily    polyethylene glycol (GLYCOLAX) packet 17 g  17 g Oral Daily PRN    acetaminophen (TYLENOL) tablet 650 mg  650 mg Oral Q6H PRN    Or    acetaminophen (TYLENOL) suppository 650 mg  650 mg Rectal Q6H PRN    ALPRAZolam (XANAX) tablet 0.25 mg  0.25 mg Oral BID PRN    glucose chewable tablet 16 g  4 tablet Oral PRN    dextrose bolus 10% 125 mL  125 mL IntraVENous PRN    Or    dextrose bolus 10% 250 mL  250 mL IntraVENous PRN    glucagon (rDNA) injection 1 mg  1 mg SubCUTAneous PRN    dextrose 10 % infusion   IntraVENous Continuous PRN    morphine injection 4 mg  4 mg IntraVENous Q4H PRN    HYDROcodone-acetaminophen (NORCO) 7.5-325 MG per tablet 1 tablet  1 tablet Oral Q6H PRN    insulin lispro (HUMALOG) injection vial 0-8 Units  0-8 Units SubCUTAneous TID WC    insulin lispro (HUMALOG) injection vial 0-4 Units  0-4 Units SubCUTAneous Nightly       Signed:  Jose L Ren, APRN - CNP

## 2022-10-02 NOTE — CONSULTS
Patient ID:  Lupe Somers  [id number]  89 y.o.  1955    Date of Consultation:  October 2, 2022  Referring Physician:  Dr. Ivania Coats    Subjective: Pt complains of left ankle pain after a fall 3 days ago. She noted pain but did not present to the ER until yesterday after subsequent fall while using her walker. X-rays in the ER confirmed displaced bimalleolar fracture. Posterior short leg splint with stirrup applied and patient admitted for further observation and concerns regarding Type 1 DM. Past Medical History Includes:   Past Medical History:   Diagnosis Date    Benign paroxysmal positional vertigo     Breast cancer ([address] 12/2010    Cancer ([address] 2010    lt breast    Cancer ([address] 1988    colon    Chronic pain     Chronic renal disease, stage III [facility]) [[id number]] 6/29/2022    Diabetic polyneuropathy associated with type 1 diabetes mellitus ([address] 1/13/2020    Gastroparesis     GERD (gastroesophageal reflux disease)     medicaton controlled    Glaucoma     Hiatal hernia     HLD (hyperlipidemia)     Hypertension     medication controlled    Neuropathy, diabetic ([address]     Osteoporosis, unspecified     Personal history of malignant neoplasm of breast     Retinopathy due to secondary diabetes ([address]     Thyroid disease     medication controlled    Type 1 diabetes ([address] 5years of age    insulin, Hemoglobin A1c 6.0 on 7/25/18.      Unspecified hereditary and idiopathic peripheral neuropathy     Unspecified hypothyroidism    ,   Past Surgical History:   Procedure Laterality Date    COLONOSCOPY  2009    COLONOSCOPY N/A 8/21/2018    COLONOSCOPY  BMI 25 performed by Liang Johansen DO at [address] [place] [address] Right     eye stent for gluacoma     HYSTERECTOMY, TOTAL ABDOMINAL (CERVIX REMOVED)  1990    vaginal for dysplasia    MASTECTOMY Left 12/02/2010    Radical mestectomy    THYROIDECTOMY  1986    goiter/graves disease    TOTAL COLECTOMY  1988    Colon ca    TUBAL LIGATION  1970\"s    due to diabetes VITRECTOMY Right 2000    hemmorrhage     Family History:   Family History   Problem Relation Age of Onset    Heart Disease Father     Diabetes Father         Type II    Heart Attack Father 48    Atrial Fibrillation Mother     No Known Problems Sister     Thyroid Disease Sister         multinodular goiter    Gout Brother     Colon Cancer Paternal Grandmother     Breast Cancer Mother       Social History:   Social History     Tobacco Use    Smoking status: Never    Smokeless tobacco: Never   Substance Use Topics    Alcohol use: No       ALLERGIES:   Allergies   Allergen Reactions    Lisinopril Other (See Comments)    Mirabegron Hives    Buspirone Palpitations        Patient Medications    Current Facility-Administered Medications   Medication Dose Route Frequency    tuberculin injection 5 Units  5 Units IntraDERmal Once    oyster shell calcium w/D 500-200 MG-UNIT tablet 1 tablet  1 tablet Oral BID    carboxymethylcellulose (THERATEARS) 1 % ophthalmic gel 1 drop  1 drop Ophthalmic Daily PRN    insulin lispro (HUMALOG) injection vial 2 Units  2 Units SubCUTAneous 4x Daily AC & HS    insulin glargine (LANTUS) injection vial 30 Units  30 Units SubCUTAneous Nightly    levothyroxine (SYNTHROID) tablet 112 mcg  112 mcg Oral QAM AC    cetirizine (ZYRTEC) tablet 10 mg  10 mg Oral Daily PRN    losartan (COZAAR) tablet 100 mg  100 mg Oral Daily    miconazole (MICOTIN) 2 % powder   Topical BID    ondansetron (ZOFRAN-ODT) disintegrating tablet 8 mg  8 mg Oral Q8H PRN    pantoprazole (PROTONIX) tablet 40 mg  40 mg Oral Daily    ketotifen (ZADITOR) 0.025 % ophthalmic solution 1 drop  1 drop Both Eyes BID    pilocarpine (PILOCAR) 1 % ophthalmic solution 1 drop  1 drop Left Eye BID    sennosides-docusate sodium (SENOKOT-S) 8.6-50 MG tablet 1 tablet  1 tablet Oral Daily PRN    latanoprost (XALATAN) 0.005 % ophthalmic solution 1 drop  1 drop Both Eyes Nightly    sodium chloride flush 0.9 % injection 5-40 mL  5-40 mL IntraVENous 2 times per day    sodium chloride flush 0.9 % injection 5-40 mL  5-40 mL IntraVENous PRN    0.9 % sodium chloride infusion   IntraVENous PRN    enoxaparin (LOVENOX) injection 40 mg  40 mg SubCUTAneous Daily    polyethylene glycol (GLYCOLAX) packet 17 g  17 g Oral Daily PRN    acetaminophen (TYLENOL) tablet 650 mg  650 mg Oral Q6H PRN    Or    acetaminophen (TYLENOL) suppository 650 mg  650 mg Rectal Q6H PRN    ALPRAZolam (XANAX) tablet 0.25 mg  0.25 mg Oral BID PRN    glucose chewable tablet 16 g  4 tablet Oral PRN    dextrose bolus 10% 125 mL  125 mL IntraVENous PRN    Or    dextrose bolus 10% 250 mL  250 mL IntraVENous PRN    glucagon (rDNA) injection 1 mg  1 mg SubCUTAneous PRN    dextrose 10 % infusion   IntraVENous Continuous PRN    morphine injection 4 mg  4 mg IntraVENous Q4H PRN    HYDROcodone-acetaminophen (NORCO) 7.5-325 MG per tablet 1 tablet  1 tablet Oral Q6H PRN    insulin lispro (HUMALOG) injection vial 0-8 Units  0-8 Units SubCUTAneous TID WC    insulin lispro (HUMALOG) injection vial 0-4 Units  0-4 Units SubCUTAneous Nightly         Review of Systems:  Pertinent items are noted in HPI. Objective:VITALS: Patient Vitals for the past 8 hrs:   BP Temp Temp src Pulse Resp SpO2   10/02/22 0940 -- -- -- -- 20 --   10/02/22 0738 131/61 97.7 °F (36.5 °C) Oral 82 16 95 %   10/02/22 0414 (!) 132/57 97.3 °F (36.3 °C) Oral 59 16 97 %    , Temp (24hrs), Av.9 °F (36.6 °C), Min:97.3 °F (36.3 °C), Max:98.8 °F (37.1 °C)      Examination shows left ankle in posterior short leg splint. NV intact. Sensation in toes diminished to light touch. Patient can wiggle toes. Cap refill is brisk. Mohsen Reeves: History: Fall with left ankle pain and swelling     EXAM: 3 views left ankle     FINDINGS: There are fractures of the medial and lateral malleoli. There is  medial and lateral subcutaneous soft tissue edema. Degenerative change of the  midfoot noted.        Impression   Fractures of the medial and lateral ivory. Assessment/Plan:   Will likely need surgical intervention sometime next week with Dr. Estiven Stiles. Continue to elevate and ice.   Pain control               ARIS BARRIENTOS, LON - CNP Negative Screen

## 2022-10-02 NOTE — CONSULTS
Consult    Patient: Mouna Nelson MRN: [id number]     YOB: 1955  Age: 78 y.o. Sex: female      Subjective:      Mouna Nelson is a 78 y.o. female who is being seen for falls. The patient has a 50+ year history of type 1 diabetes and as a complication, she has diabetic polyneuropathy. She states that her balance and gait have been worsening over a period of 6 years, at least.  In addition, she has vision loss/retinopathy secondary to diabetes. Unfortunately, the patient fell and has a fracture which will require surgery. Otherwise, no focal weakness, sensory changes that are new, or changes in language or speech. Past Medical History:   Diagnosis Date    Benign paroxysmal positional vertigo     Breast cancer ([address] 12/2010    Cancer ([address] 2010    lt breast    Cancer ([address] 1988    colon    Chronic pain     Chronic renal disease, stage III [facility]) [[id number]] 6/29/2022    Diabetic polyneuropathy associated with type 1 diabetes mellitus ([address] 1/13/2020    Gastroparesis     GERD (gastroesophageal reflux disease)     medicaton controlled    Glaucoma     Hiatal hernia     HLD (hyperlipidemia)     Hypertension     medication controlled    Neuropathy, diabetic ([address]     Osteoporosis, unspecified     Personal history of malignant neoplasm of breast     Retinopathy due to secondary diabetes ([address]     Thyroid disease     medication controlled    Type 1 diabetes ([address] 5years of age    insulin, Hemoglobin A1c 6.0 on 7/25/18.      Unspecified hereditary and idiopathic peripheral neuropathy     Unspecified hypothyroidism      Past Surgical History:   Procedure Laterality Date    COLONOSCOPY  2009    COLONOSCOPY N/A 8/21/2018    COLONOSCOPY  BMI 25 performed by Barry Meter DO at [address] [facility] Right     eye stent for gluacoma     HYSTERECTOMY, TOTAL ABDOMINAL (CERVIX REMOVED)  1990    vaginal for dysplasia    MASTECTOMY Left 12/02/2010    Radical mestectomy    THYROIDECTOMY  1986    goiter/graves disease    TOTAL COLECTOMY  1988    Colon ca    TUBAL LIGATION  1970\"s    due to diabetes    VITRECTOMY Right 2000    hemmorrhage      Family History   Problem Relation Age of Onset    Heart Disease Father     Diabetes Father         Type II    Heart Attack Father 48    Atrial Fibrillation Mother     No Known Problems Sister     Thyroid Disease Sister         multinodular goiter    Gout Brother     Colon Cancer Paternal Grandmother     Breast Cancer Mother      Social History     Tobacco Use    Smoking status: Never    Smokeless tobacco: Never   Substance Use Topics    Alcohol use: No      Current Facility-Administered Medications   Medication Dose Route Frequency Provider Last Rate Last Admin    tuberculin injection 5 Units  5 Units IntraDERmal Once [place] Cea, APRN - CNP        oyster shell calcium w/D 500-200 MG-UNIT tablet 1 tablet  1 tablet Oral BID Pilar Pinto MD   1 tablet at 10/02/22 0919    carboxymethylcellulose (THERATEARS) 1 % ophthalmic gel 1 drop  1 drop Ophthalmic Daily PRN Pilar Pinto MD        insulin lispro (HUMALOG) injection vial 2 Units  2 Units SubCUTAneous 4x Daily AC & HS Pilar Pinto MD   2 Units at 10/02/22 9368    insulin glargine (LANTUS) injection vial 30 Units  30 Units SubCUTAneous Nightly Pilar Pinto MD   30 Units at 10/01/22 2011    levothyroxine (SYNTHROID) tablet 112 mcg  112 mcg Oral QAM AC Pilar Pinto MD   112 mcg at 10/02/22 0704    cetirizine (ZYRTEC) tablet 10 mg  10 mg Oral Daily PRN Pilar Pinto MD        losartan (COZAAR) tablet 100 mg  100 mg Oral Daily Pilar Pinto MD   100 mg at 10/02/22 0917    miconazole (MICOTIN) 2 % powder   Topical BID Pilar Pinto MD   Given at 10/02/22 0919    ondansetron (ZOFRAN-ODT) disintegrating tablet 8 mg  8 mg Oral Q8H PRN Pilar Pinto MD   8 mg at 10/01/22 2032    pantoprazole (PROTONIX) tablet 40 mg  40 mg Oral Daily Pilar Pinto MD   40 mg at 10/02/22 0917    ketotifen (ZADITOR) 0.025 % ophthalmic solution 1 drop  1 drop Both Eyes BID Ana Lilia Chambers MD        pilocarpine [facility]) 1 % ophthalmic solution 1 drop  1 drop Left Eye BID Ana Lilia Chambers MD        sennosides-docusate sodium (SENOKOT-S) 8.6-50 MG tablet 1 tablet  1 tablet Oral Daily PRN Ana Lilia Chambers MD        latanoprost (XALATAN) 0.005 % ophthalmic solution 1 drop  1 drop Both Eyes Nightly Ana Lilia Chambers MD        sodium chloride flush 0.9 % injection 5-40 mL  5-40 mL IntraVENous 2 times per day Ana Lilia Chambers MD   5 mL at 10/02/22 0919    sodium chloride flush 0.9 % injection 5-40 mL  5-40 mL IntraVENous PRN Ana Lilia Chambers MD        0.9 % sodium chloride infusion   IntraVENous PRN Ana Lilia Chambers MD        enoxaparin (LOVENOX) injection 40 mg  40 mg SubCUTAneous Daily Ana Lilia Chambers MD   40 mg at 10/02/22 0917    polyethylene glycol (GLYCOLAX) packet 17 g  17 g Oral Daily PRN Ana Lilia Chambers MD        acetaminophen (TYLENOL) tablet 650 mg  650 mg Oral Q6H MEDHATN Ana Lilia Chambers MD        Or    acetaminophen (TYLENOL) suppository 650 mg  650 mg Rectal Q6H PRN Ana Lilia Chambers MD        ALPRAZolam [facility]) tablet 0.25 mg  0.25 mg Oral BID PRN Ana Lilia Chambers MD        glucose chewable tablet 16 g  4 tablet Oral PRN Ana Lilia Chambers MD        dextrose bolus 10% 125 mL  125 mL IntraVENous TOMMIE Chambers MD        Or    dextrose bolus 10% 250 mL  250 mL IntraVENous PRN Ana Lilia Chambers MD        glucagon (rDNA) injection 1 mg  1 mg SubCUTAneous PRN Ana Lilia Chambers MD        dextrose 10 % infusion   IntraVENous Continuous PRRACHEAL Chambers MD        morphine injection 4 mg  4 mg IntraVENous Q4H PRRACHEAL Chambers MD        HYDROcodone-acetaminophen [facility]) 7.5-325 MG per tablet 1 tablet  1 tablet Oral Q6H TOMMIE Chambers MD   1 tablet at 10/02/22 0940    insulin lispro (HUMALOG) injection vial 0-8 Units  0-8 Units SubCUTAneous TID WC Ana Lilia Chambers MD        insulin lispro (HUMALOG) injection vial 0-4 Units 0-4 Units SubCUTAneous Nightly Rae Waldron MD            Allergies   Allergen Reactions    Lisinopril Other (See Comments)    Mirabegron Hives    Buspirone Palpitations       Review of Systems:  CONSTITUTIONAL: No weight loss, fever, chills, but positive fatigue  HEENT: Eyes: Some vision loss in the right eye. Ears, Nose, Throat: No hearing loss, sneezing, congestion, runny nose or sore throat. SKIN: No rash or itching. CARDIOVASCULAR: No chest pain, chest pressure or chest discomfort. No palpitations or edema. RESPIRATORY: No shortness of breath, cough or sputum. GASTROINTESTINAL: No anorexia, nausea, vomiting or diarrhea. No abdominal pain or blood. GENITOURINARY: no burning with urination. NEUROLOGICAL: No headache, dizziness, syncope, paralysis, ataxia, numbness or tingling in the extremities. No change in bowel or bladder control. MUSCULOSKELETAL: Some pain in the left leg in the area of the hip HEMATOLOGIC: No anemia, bleeding or bruising. LYMPHATICS: No enlarged nodes. No history of splenectomy. PSYCHIATRIC: She is currently somewhat anxious and tearful  ENDOCRINOLOGIC: No reports of sweating, cold or heat intolerance. No polyuria or polydipsia. ALLERGIES: No history of asthma, hives, eczema or rhinitis. Objective:     Vitals:    10/02/22 0221 10/02/22 0414 10/02/22 0738 10/02/22 0940   BP:  (!) 132/57 131/61    Pulse:  59 82    Resp: 16 16 16 20   Temp:  97.3 °F (36.3 °C) 97.7 °F (36.5 °C)    TempSrc:  Oral Oral    SpO2:  97% 95%    Weight:       Height:            Physical Exam:  General - Well developed, well nourished, in no apparent distress. Pleasant and conversant. At times, tearful  HEENT - Normocephalic, atraumatic. Conjunctiva, tympanic membranes, and oropharynx are clear. Neck - Supple without masses, no bruits   Cardiovascular - Regular rate and rhythm. Normal S1, S2 without murmurs, rubs, or gallops. Lungs - Clear to auscultation.   Abdomen - Soft, nontender with normal bowel sounds. Extremities - Peripheral pulses intact. No edema and no rashes. Neurological examination - Comprehension, attention , memory and reasoning are intact. Language and speech are normal. On cranial nerve examination pupils are equal round and reactive to light. Fundoscopic examination is normal. Visual acuity is adequate. Visual fields are full to finger confrontation. Extraocular motility is normal. Face is symmetric and sensation is intact to light touch. Hearing is intact to finger rustle bilaterally. Motor examination - There is normal muscle tone and bulk. Power is full throughout. Muscle stretch reflexes are absent in the bilateral lower limbs. Muscle stretch reflexes are 3+ at the brachioradialis, bilaterally. Toe is downgoing on the right but may be upgoing on the left which is difficult to discern secondary to casting of the left foot and ankle. Sensation is diminished to light touch, pinprick, vibration, and proprioception in the bilateral distal lower limbs. Cerebellar examination is normal.  Gait and stance not assessed due to orthopedic injuries and fall risk         Lab Results   Component Value Date/Time    CHOL 247 12/21/2021 09:40 AM    HDL 84 12/21/2021 09:40 AM    VLDL 14 12/21/2021 09:40 AM        Lab Results   Component Value Date/Time    YKG0YUDB 5.7 05/03/2022 03:46 PM        CT Results (most recent): Personally Reviewed   Results for orders placed during the hospital encounter of 10/01/22    CT HEAD WO CONTRAST    Narrative  History: S/P Fall with wide based gait    Exam: CT head without contrast    Technique: Thin section axial CT images were obtained from the skullbase through  the vertex. Radiation dose reduction techniques were used for this study. Our  CT scanners use one or all of the following: Automated exposure control,  adjustment of the mA and/or kV according to patient size, use of iterative  reconstruction. Findings:  There is generalized cerebral atrophy with chronic appearing white  matter change in the corona radiata and centrum semiovale, confluent. No acute  intracranial hemorrhage. No extra-axial fluid collection is present. There is no  mass or mass-effect. The basilar cisterns are patent. The paranasal sinuses and  mastoid air cells are clear. Impression  Chronic appearing changes without acute intracranial abnormality. Assessment and Plan    70-year-old woman with progressive diabetic polyneuropathy in the setting of a 50+ year history of type 1 diabetes. Her neurological examination shows a moderate to severe peripheral polyneuropathy. In addition, the patient has diabetic retinopathy. For these reasons, she is prone to falls. She may have a positive Babinski on the left and has hyperreflexia in the bilateral upper extremities which may represent central nervous system pathology such as cervical stenosis or chronic lacunar infarcts. She is resistant to MRI secondary to claustrophobia. Given her chronic microangiopathic changes, I recommend aspirin 81 mg daily for stroke prevention. This can be started after the patient's surgery. She will need extensive physical therapy and occupational therapy. Last, I will check a lipid panel. If the patient has hyperlipidemia then I recommend starting a high intensity statin.   No further recommendations neurology will sign off

## 2022-10-02 NOTE — PROGRESS NOTES
Patient is a 41-year-old female with diabetic neuropathy that fell from standing yesterday evening. She was found to have a displaced bimalleolar left ankle fracture. She will need operative fixation however with her diabetes this is a much more tenuous issue fraught with complications. We will continue with splinting and elevation. We will discuss with Dr. Annetta Shepard for operative intervention this week.

## 2022-10-02 NOTE — CARE COORDINATION
Pt chart reviewed for discharge planning. LMSW met with pt. She is admitted with fx and anticipates she will need surgery. Pt has been dealing with juvenile DM since age 7. 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 Her sister lives nearby and is supportive as needed. She has some vision loss and no longer drives. No services prior to this admission but had [facility] in the past with [place] and requests a referral back to [facility] at discharge. She has a RW and BSC for home use but feels she will need a W/C now due to weight bearing status. CM will follow pt plan of care and assist with supportive care referrals pending pt clinical progress. Please consult case management if specific needs arise. 10/02/22 2521   Service Assessment   Patient Orientation Alert and Oriented   Cognition Alert   History Provided By Patient   Primary Caregiver Self   Support Systems Family Members  (sister)   [address] [facility] is: Legal Next of [address]   PCP Verified by CM Yes   Last Visit to PCP Within last 3 months   Prior Functional Level Independent in ADLs/IADLs   Current Functional Level Assistance with the following:;Mobility   Can patient return to prior living arrangement Yes   Ability to make needs known: Good   Family able to assist with home care needs: Yes   Would you like for me to discuss the discharge plan with any other family members/significant others, and if so, who?  No   Financial Resources Medicare  ([employer] Medicare Advantage plan)   Social/Functional History   Lives With Alone   Type of [address] One level   Home Equipment Walker, [address] Help From Family   ADL Assistance Independent   Homemaking Assistance Independent   Ambulation Assistance Independent   Transfer Assistance Independent   Active  No   Patient's  Info sister   Mode of Transportation Car   Occupation On disability   Type of Occupation nurse   Discharge Planning   Type of Residence House   Living Arrangements Alone   Current Services Prior To Admission None   Potential Assistance Needed Home Care;Durable Medical Equipment   Potential DME Needed Wheelchair   Potential Assistance Purchasing Medications No   Type of Home Care Services None   Patient expects to be discharged to: House   One/Two [address] One [address]   History of falls? 1   Services At/After Discharge   Transition of Care Consult (CM Consult) Home Health;DME/Supply Assistance   Internal Home Health No   Reason Outside Agency Chosen Patient already serviced by other home care/hospice agency;Script used patient chose alternate agency  (requests referral back to [facility] due to previous positive encounter)   [address] Discharge Home Health;DME   [address] Provided? No   Mode of Transport at Discharge Other (see comment)  (family)   Confirm Follow Up Transport Family   Condition of Participation: Discharge Planning   The Plan for Transition of Care is related to the following treatment goals: Pt will need home care/rehab services to return to her functional baseline. The Patient and/or Patient Representative was provided with a Choice of Provider? Patient   The Patient and/Or Patient Representative agree with the Discharge Plan? Yes   Freedom of Choice list was provided with basic dialogue that supports the patient's individualized plan of care/goals, treatment preferences, and shares the quality data associated with the providers?   Yes

## 2022-10-03 ENCOUNTER — ANESTHESIA EVENT (OUTPATIENT)
Dept: SURGERY | Age: 67
DRG: 494 | End: 2022-10-03
Payer: MEDICARE

## 2022-10-03 ENCOUNTER — ANESTHESIA (OUTPATIENT)
Dept: SURGERY | Age: 67
DRG: 494 | End: 2022-10-03
Payer: MEDICARE

## 2022-10-03 ENCOUNTER — APPOINTMENT (OUTPATIENT)
Dept: GENERAL RADIOLOGY | Age: 67
DRG: 494 | End: 2022-10-03
Payer: MEDICARE

## 2022-10-03 LAB
ABO + RH BLD: NORMAL
ANION GAP SERPL CALC-SCNC: 6 MMOL/L (ref 4–13)
BASOPHILS # BLD: 0 K/UL (ref 0–0.2)
BASOPHILS NFR BLD: 0 % (ref 0–2)
BLOOD GROUP ANTIBODIES SERPL: NORMAL
BUN SERPL-MCNC: 15 MG/DL (ref 8–23)
CALCIUM SERPL-MCNC: 9.1 MG/DL (ref 8.3–10.4)
CHLORIDE SERPL-SCNC: 100 MMOL/L (ref 101–110)
CO2 SERPL-SCNC: 28 MMOL/L (ref 21–32)
CREAT SERPL-MCNC: 0.6 MG/DL (ref 0.6–1)
DIFFERENTIAL METHOD BLD: ABNORMAL
EOSINOPHIL # BLD: 0.2 K/UL (ref 0–0.8)
EOSINOPHIL NFR BLD: 2 % (ref 0.5–7.8)
ERYTHROCYTE [DISTWIDTH] IN BLOOD BY AUTOMATED COUNT: 13 % (ref 11.9–14.6)
GLUCOSE BLD STRIP.AUTO-MCNC: 106 MG/DL (ref 65–100)
GLUCOSE BLD STRIP.AUTO-MCNC: 108 MG/DL (ref 65–100)
GLUCOSE BLD STRIP.AUTO-MCNC: 111 MG/DL (ref 65–100)
GLUCOSE BLD STRIP.AUTO-MCNC: 114 MG/DL (ref 65–100)
GLUCOSE SERPL-MCNC: 111 MG/DL (ref 65–100)
HCT VFR BLD AUTO: 34.8 % (ref 35.8–46.3)
HGB BLD-MCNC: 11.6 G/DL (ref 11.7–15.4)
IMM GRANULOCYTES # BLD AUTO: 0 K/UL (ref 0–0.5)
IMM GRANULOCYTES NFR BLD AUTO: 0 % (ref 0–5)
LYMPHOCYTES # BLD: 0.9 K/UL (ref 0.5–4.6)
LYMPHOCYTES NFR BLD: 12 % (ref 13–44)
MCH RBC QN AUTO: 33.5 PG (ref 26.1–32.9)
MCHC RBC AUTO-ENTMCNC: 33.3 G/DL (ref 31.4–35)
MCV RBC AUTO: 100.6 FL (ref 79.6–97.8)
MONOCYTES # BLD: 0.8 K/UL (ref 0.1–1.3)
MONOCYTES NFR BLD: 11 % (ref 4–12)
NEUTS SEG # BLD: 5.5 K/UL (ref 1.7–8.2)
NEUTS SEG NFR BLD: 75 % (ref 43–78)
NRBC # BLD: 0 K/UL (ref 0–0.2)
PLATELET # BLD AUTO: 285 K/UL (ref 150–450)
PMV BLD AUTO: 9.5 FL (ref 9.4–12.3)
POTASSIUM SERPL-SCNC: 3.9 MMOL/L (ref 3.5–5.1)
RBC # BLD AUTO: 3.46 M/UL (ref 4.05–5.2)
RPR SER QL: NONREACTIVE
SERVICE CMNT-IMP: ABNORMAL
SODIUM SERPL-SCNC: 134 MMOL/L (ref 136–145)
SPECIMEN EXP DATE BLD: NORMAL
WBC # BLD AUTO: 7.3 K/UL (ref 4.3–11.1)

## 2022-10-03 PROCEDURE — 2580000003 HC RX 258: Performed by: ANESTHESIOLOGY

## 2022-10-03 PROCEDURE — 1100000003 HC PRIVATE W/ TELEMETRY

## 2022-10-03 PROCEDURE — 7100000001 HC PACU RECOVERY - ADDTL 15 MIN: Performed by: ORTHOPAEDIC SURGERY

## 2022-10-03 PROCEDURE — C1713 ANCHOR/SCREW BN/BN,TIS/BN: HCPCS | Performed by: ORTHOPAEDIC SURGERY

## 2022-10-03 PROCEDURE — 0QSH04Z REPOSITION LEFT TIBIA WITH INTERNAL FIXATION DEVICE, OPEN APPROACH: ICD-10-PCS | Performed by: ORTHOPAEDIC SURGERY

## 2022-10-03 PROCEDURE — 0QSK04Z REPOSITION LEFT FIBULA WITH INTERNAL FIXATION DEVICE, OPEN APPROACH: ICD-10-PCS | Performed by: ORTHOPAEDIC SURGERY

## 2022-10-03 PROCEDURE — 6360000002 HC RX W HCPCS: Performed by: INTERNAL MEDICINE

## 2022-10-03 PROCEDURE — C1769 GUIDE WIRE: HCPCS | Performed by: ORTHOPAEDIC SURGERY

## 2022-10-03 PROCEDURE — 36415 COLL VENOUS BLD VENIPUNCTURE: CPT

## 2022-10-03 PROCEDURE — 86901 BLOOD TYPING SEROLOGIC RH(D): CPT

## 2022-10-03 PROCEDURE — 6370000000 HC RX 637 (ALT 250 FOR IP): Performed by: INTERNAL MEDICINE

## 2022-10-03 PROCEDURE — 6360000002 HC RX W HCPCS: Performed by: REGISTERED NURSE

## 2022-10-03 PROCEDURE — 2580000003 HC RX 258: Performed by: ORTHOPAEDIC SURGERY

## 2022-10-03 PROCEDURE — 82962 GLUCOSE BLOOD TEST: CPT

## 2022-10-03 PROCEDURE — 3700000000 HC ANESTHESIA ATTENDED CARE: Performed by: ORTHOPAEDIC SURGERY

## 2022-10-03 PROCEDURE — 2720000010 HC SURG SUPPLY STERILE: Performed by: ORTHOPAEDIC SURGERY

## 2022-10-03 PROCEDURE — 3700000001 HC ADD 15 MINUTES (ANESTHESIA): Performed by: ORTHOPAEDIC SURGERY

## 2022-10-03 PROCEDURE — 80048 BASIC METABOLIC PNL TOTAL CA: CPT

## 2022-10-03 PROCEDURE — 3600000004 HC SURGERY LEVEL 4 BASE: Performed by: ORTHOPAEDIC SURGERY

## 2022-10-03 PROCEDURE — 6370000000 HC RX 637 (ALT 250 FOR IP): Performed by: ANESTHESIOLOGY

## 2022-10-03 PROCEDURE — 2500000003 HC RX 250 WO HCPCS: Performed by: REGISTERED NURSE

## 2022-10-03 PROCEDURE — 6360000002 HC RX W HCPCS: Performed by: ORTHOPAEDIC SURGERY

## 2022-10-03 PROCEDURE — 6370000000 HC RX 637 (ALT 250 FOR IP): Performed by: ORTHOPAEDIC SURGERY

## 2022-10-03 PROCEDURE — 85025 COMPLETE CBC W/AUTO DIFF WBC: CPT

## 2022-10-03 PROCEDURE — 3600000014 HC SURGERY LEVEL 4 ADDTL 15MIN: Performed by: ORTHOPAEDIC SURGERY

## 2022-10-03 PROCEDURE — 6360000002 HC RX W HCPCS: Performed by: ANESTHESIOLOGY

## 2022-10-03 PROCEDURE — 2580000003 HC RX 258: Performed by: INTERNAL MEDICINE

## 2022-10-03 PROCEDURE — 73610 X-RAY EXAM OF ANKLE: CPT

## 2022-10-03 PROCEDURE — 2709999900 HC NON-CHARGEABLE SUPPLY: Performed by: ORTHOPAEDIC SURGERY

## 2022-10-03 PROCEDURE — 27814 TREATMENT OF ANKLE FRACTURE: CPT | Performed by: ORTHOPAEDIC SURGERY

## 2022-10-03 PROCEDURE — 7100000000 HC PACU RECOVERY - FIRST 15 MIN: Performed by: ORTHOPAEDIC SURGERY

## 2022-10-03 DEVICE — SCREW BNE L40MM DIA4MM S STL CANN SHT 1/3 THRD SM HEX SOCK: Type: IMPLANTABLE DEVICE | Site: HIP | Status: FUNCTIONAL

## 2022-10-03 DEVICE — SCREW BNE L42MM DIA4MM S STL CANN SHT 1/3 THRD SM HEX SOCK: Type: IMPLANTABLE DEVICE | Site: HIP | Status: FUNCTIONAL

## 2022-10-03 DEVICE — SCREW BNE L110MM DIA3.5MM HD DIA6MM CORT S STL ST W/ SM HEX: Type: IMPLANTABLE DEVICE | Site: HIP | Status: FUNCTIONAL

## 2022-10-03 RX ORDER — OXYCODONE HYDROCHLORIDE 5 MG/1
5 TABLET ORAL
Status: DISCONTINUED | OUTPATIENT
Start: 2022-10-03 | End: 2022-10-03 | Stop reason: HOSPADM

## 2022-10-03 RX ORDER — MORPHINE SULFATE 4 MG/ML
4 INJECTION INTRAVENOUS
Status: DISCONTINUED | OUTPATIENT
Start: 2022-10-03 | End: 2022-10-06 | Stop reason: HOSPADM

## 2022-10-03 RX ORDER — LIDOCAINE HYDROCHLORIDE 20 MG/ML
INJECTION, SOLUTION EPIDURAL; INFILTRATION; INTRACAUDAL; PERINEURAL PRN
Status: DISCONTINUED | OUTPATIENT
Start: 2022-10-03 | End: 2022-10-03 | Stop reason: SDUPTHER

## 2022-10-03 RX ORDER — ONDANSETRON 2 MG/ML
INJECTION INTRAMUSCULAR; INTRAVENOUS PRN
Status: DISCONTINUED | OUTPATIENT
Start: 2022-10-03 | End: 2022-10-03 | Stop reason: SDUPTHER

## 2022-10-03 RX ORDER — HYDROMORPHONE HYDROCHLORIDE 2 MG/ML
0.5 INJECTION, SOLUTION INTRAMUSCULAR; INTRAVENOUS; SUBCUTANEOUS EVERY 5 MIN PRN
Status: DISCONTINUED | OUTPATIENT
Start: 2022-10-03 | End: 2022-10-03 | Stop reason: HOSPADM

## 2022-10-03 RX ORDER — SODIUM CHLORIDE 0.9 % (FLUSH) 0.9 %
5-40 SYRINGE (ML) INJECTION PRN
Status: DISCONTINUED | OUTPATIENT
Start: 2022-10-03 | End: 2022-10-03 | Stop reason: HOSPADM

## 2022-10-03 RX ORDER — SODIUM CHLORIDE 9 MG/ML
INJECTION, SOLUTION INTRAVENOUS PRN
Status: DISCONTINUED | OUTPATIENT
Start: 2022-10-03 | End: 2022-10-03 | Stop reason: HOSPADM

## 2022-10-03 RX ORDER — ATORVASTATIN CALCIUM 40 MG/1
40 TABLET, FILM COATED ORAL NIGHTLY
Status: DISCONTINUED | OUTPATIENT
Start: 2022-10-03 | End: 2022-10-06 | Stop reason: HOSPADM

## 2022-10-03 RX ORDER — MIDAZOLAM HYDROCHLORIDE 2 MG/2ML
2 INJECTION, SOLUTION INTRAMUSCULAR; INTRAVENOUS
Status: COMPLETED | OUTPATIENT
Start: 2022-10-03 | End: 2022-10-03

## 2022-10-03 RX ORDER — SODIUM CHLORIDE 0.9 % (FLUSH) 0.9 %
5-40 SYRINGE (ML) INJECTION EVERY 12 HOURS SCHEDULED
Status: DISCONTINUED | OUTPATIENT
Start: 2022-10-03 | End: 2022-10-03 | Stop reason: HOSPADM

## 2022-10-03 RX ORDER — FENTANYL CITRATE 50 UG/ML
100 INJECTION, SOLUTION INTRAMUSCULAR; INTRAVENOUS
Status: DISCONTINUED | OUTPATIENT
Start: 2022-10-03 | End: 2022-10-03 | Stop reason: HOSPADM

## 2022-10-03 RX ORDER — SODIUM CHLORIDE, SODIUM LACTATE, POTASSIUM CHLORIDE, CALCIUM CHLORIDE 600; 310; 30; 20 MG/100ML; MG/100ML; MG/100ML; MG/100ML
INJECTION, SOLUTION INTRAVENOUS CONTINUOUS
Status: DISCONTINUED | OUTPATIENT
Start: 2022-10-03 | End: 2022-10-03 | Stop reason: HOSPADM

## 2022-10-03 RX ORDER — PROPOFOL 10 MG/ML
INJECTION, EMULSION INTRAVENOUS PRN
Status: DISCONTINUED | OUTPATIENT
Start: 2022-10-03 | End: 2022-10-03 | Stop reason: SDUPTHER

## 2022-10-03 RX ORDER — ONDANSETRON 2 MG/ML
4 INJECTION INTRAMUSCULAR; INTRAVENOUS
Status: DISCONTINUED | OUTPATIENT
Start: 2022-10-03 | End: 2022-10-03 | Stop reason: HOSPADM

## 2022-10-03 RX ORDER — ACETAMINOPHEN 500 MG
1000 TABLET ORAL ONCE
Status: COMPLETED | OUTPATIENT
Start: 2022-10-03 | End: 2022-10-03

## 2022-10-03 RX ORDER — ROCURONIUM BROMIDE 10 MG/ML
INJECTION, SOLUTION INTRAVENOUS PRN
Status: DISCONTINUED | OUTPATIENT
Start: 2022-10-03 | End: 2022-10-03 | Stop reason: SDUPTHER

## 2022-10-03 RX ORDER — LIDOCAINE HYDROCHLORIDE 10 MG/ML
1 INJECTION, SOLUTION INFILTRATION; PERINEURAL
Status: DISCONTINUED | OUTPATIENT
Start: 2022-10-03 | End: 2022-10-03 | Stop reason: HOSPADM

## 2022-10-03 RX ORDER — PROCHLORPERAZINE EDISYLATE 5 MG/ML
5 INJECTION INTRAMUSCULAR; INTRAVENOUS
Status: DISCONTINUED | OUTPATIENT
Start: 2022-10-03 | End: 2022-10-03 | Stop reason: HOSPADM

## 2022-10-03 RX ORDER — MEPERIDINE HYDROCHLORIDE 25 MG/ML
12.5 INJECTION INTRAMUSCULAR; INTRAVENOUS; SUBCUTANEOUS EVERY 5 MIN PRN
Status: DISCONTINUED | OUTPATIENT
Start: 2022-10-03 | End: 2022-10-03 | Stop reason: HOSPADM

## 2022-10-03 RX ADMIN — Medication 1 TABLET: at 20:24

## 2022-10-03 RX ADMIN — SODIUM CHLORIDE, PRESERVATIVE FREE 5 ML: 5 INJECTION INTRAVENOUS at 07:46

## 2022-10-03 RX ADMIN — PROPOFOL 150 MG: 10 INJECTION, EMULSION INTRAVENOUS at 16:11

## 2022-10-03 RX ADMIN — LEVOTHYROXINE SODIUM 112 MCG: 0.11 TABLET ORAL at 05:34

## 2022-10-03 RX ADMIN — INSULIN GLARGINE 30 UNITS: 100 INJECTION, SOLUTION SUBCUTANEOUS at 20:37

## 2022-10-03 RX ADMIN — SODIUM CHLORIDE, PRESERVATIVE FREE 10 ML: 5 INJECTION INTRAVENOUS at 20:38

## 2022-10-03 RX ADMIN — MORPHINE SULFATE 4 MG: 4 INJECTION INTRAVENOUS at 08:51

## 2022-10-03 RX ADMIN — INSULIN LISPRO 2 UNITS: 100 INJECTION, SOLUTION INTRAVENOUS; SUBCUTANEOUS at 07:29

## 2022-10-03 RX ADMIN — PANTOPRAZOLE SODIUM 40 MG: 40 TABLET, DELAYED RELEASE ORAL at 08:48

## 2022-10-03 RX ADMIN — ONDANSETRON 8 MG: 4 TABLET, ORALLY DISINTEGRATING ORAL at 20:27

## 2022-10-03 RX ADMIN — ANTI-FUNGAL POWDER MICONAZOLE NITRATE TALC FREE: 1.42 POWDER TOPICAL at 08:53

## 2022-10-03 RX ADMIN — MORPHINE SULFATE 4 MG: 4 INJECTION INTRAVENOUS at 02:26

## 2022-10-03 RX ADMIN — MIDAZOLAM HYDROCHLORIDE 2 MG: 1 INJECTION, SOLUTION INTRAMUSCULAR; INTRAVENOUS at 15:33

## 2022-10-03 RX ADMIN — Medication 1 TABLET: at 08:55

## 2022-10-03 RX ADMIN — ROCURONIUM BROMIDE 35 MG: 50 INJECTION, SOLUTION INTRAVENOUS at 16:11

## 2022-10-03 RX ADMIN — LIDOCAINE HYDROCHLORIDE 100 MG: 20 INJECTION, SOLUTION EPIDURAL; INFILTRATION; INTRACAUDAL; PERINEURAL at 16:11

## 2022-10-03 RX ADMIN — ONDANSETRON 4 MG: 2 INJECTION INTRAMUSCULAR; INTRAVENOUS at 16:19

## 2022-10-03 RX ADMIN — SODIUM CHLORIDE, SODIUM LACTATE, POTASSIUM CHLORIDE, AND CALCIUM CHLORIDE: 600; 310; 30; 20 INJECTION, SOLUTION INTRAVENOUS at 15:15

## 2022-10-03 RX ADMIN — Medication 2 G: at 16:17

## 2022-10-03 RX ADMIN — SUGAMMADEX 400 MG: 100 INJECTION, SOLUTION INTRAVENOUS at 16:47

## 2022-10-03 RX ADMIN — MORPHINE SULFATE 4 MG: 4 INJECTION INTRAVENOUS at 21:35

## 2022-10-03 RX ADMIN — SENNOSIDES AND DOCUSATE SODIUM 1 TABLET: 8.6; 5 TABLET ORAL at 22:42

## 2022-10-03 RX ADMIN — ACETAMINOPHEN 500 MG: 500 TABLET, FILM COATED ORAL at 15:11

## 2022-10-03 RX ADMIN — ONDANSETRON 8 MG: 4 TABLET, ORALLY DISINTEGRATING ORAL at 08:48

## 2022-10-03 ASSESSMENT — PAIN DESCRIPTION - LOCATION
LOCATION: FOOT
LOCATION: ANKLE
LOCATION: FOOT;ANKLE

## 2022-10-03 ASSESSMENT — PAIN - FUNCTIONAL ASSESSMENT
PAIN_FUNCTIONAL_ASSESSMENT: 0-10
PAIN_FUNCTIONAL_ASSESSMENT: PREVENTS OR INTERFERES SOME ACTIVE ACTIVITIES AND ADLS
PAIN_FUNCTIONAL_ASSESSMENT: PREVENTS OR INTERFERES SOME ACTIVE ACTIVITIES AND ADLS
PAIN_FUNCTIONAL_ASSESSMENT: 0-10
PAIN_FUNCTIONAL_ASSESSMENT: PREVENTS OR INTERFERES SOME ACTIVE ACTIVITIES AND ADLS
PAIN_FUNCTIONAL_ASSESSMENT: ACTIVITIES ARE NOT PREVENTED
PAIN_FUNCTIONAL_ASSESSMENT: 0-10

## 2022-10-03 ASSESSMENT — PAIN SCALES - GENERAL
PAINLEVEL_OUTOF10: 7
PAINLEVEL_OUTOF10: 0
PAINLEVEL_OUTOF10: 10
PAINLEVEL_OUTOF10: 10
PAINLEVEL_OUTOF10: 0

## 2022-10-03 ASSESSMENT — PAIN DESCRIPTION - ORIENTATION
ORIENTATION: LEFT

## 2022-10-03 ASSESSMENT — PAIN DESCRIPTION - FREQUENCY
FREQUENCY: INTERMITTENT
FREQUENCY: CONTINUOUS

## 2022-10-03 ASSESSMENT — PAIN DESCRIPTION - PAIN TYPE
TYPE: ACUTE PAIN

## 2022-10-03 ASSESSMENT — PAIN DESCRIPTION - DESCRIPTORS
DESCRIPTORS: ACHING;DISCOMFORT;NUMBNESS
DESCRIPTORS: ACHING
DESCRIPTORS: SHARP
DESCRIPTORS: SHARP

## 2022-10-03 ASSESSMENT — PAIN DESCRIPTION - ONSET
ONSET: GRADUAL
ONSET: GRADUAL

## 2022-10-03 NOTE — PERIOP NOTE
TRANSFER - IN REPORT:    Verbal report received from ESTER Quiroz on Isabella David  being received from  for ordered procedure      Report consisted of patient's Situation, Background, Assessment and   Recommendations(SBAR). Information from the following report(s) Nurse Handoff Report, Adult Overview, Intake/Output, MAR, Recent Results, Pre Procedure Checklist, Procedure Verification, and Quality Measures was reviewed with the receiving nurse. Opportunity for questions and clarification was provided. Assessment to be completed upon patient's arrival to unit and care will be assumed.

## 2022-10-03 NOTE — PROGRESS NOTES
Physical Therapy Note:    Attempted to see patient this AM for physical therapy evaluation session. Patient awaiting surgical plan for repair of displaced fracture. Will follow and re-attempt as schedule permits/patient available.  Thank you,    Corey Ruiz

## 2022-10-03 NOTE — PROGRESS NOTES
Pt refusing [facility] stating no one else has done them and does not want them done due to having her own pump. Charge RN and primary RN made aware of this. Education provided to patient regarding lack of record in [facility].

## 2022-10-03 NOTE — PROGRESS NOTES
TRANSFER - OUT REPORT:    Verbal report given to [facility] RN(name) on Dorsie Day  being transferred to PreOp(unit) for ordered procedure       Report consisted of patients Situation, Background, Assessment and   Recommendations(SBAR). Information from the following report(s) Nurse Handoff Report was reviewed with the receiving nurse. Opportunity for questions and clarification was provided.       Patient transported with:   [employer]

## 2022-10-03 NOTE — PROGRESS NOTES
Hospitalist Progress Note   Admit Date:  10/1/2022  1:42 PM   Name:  Nahomi Canales   Age:  78 y.o. Sex:  female  :  1955   MRN:  [id number]   Room:  [facility]    Presenting Complaint: Ankle Pain     Reason(s) for Admission: Closed bimalleolar fracture of left ankle, initial encounter [R89.472Z]  Nondisp bimalleol fx l low leg, init for opn fx type [address] Course:   Ms. Dennise Iyer is a 78 y.o. CF with a PMH of HTN, DM Type 1 since 48 + years, GERD, DM Polyneuropathy, CKD stage 3, Glaucoma, Hypothyroidism, Dry eyes and gait imbalance who was admitted on 10/1 with LLE bimalleolar fx. Also concerns of ongoing shuffle gait, seen by neurology with recs for daily baby ASA and extensive PT/OT. Plans for surgery on Monday. Subjective & 24hr Events (10/03/22): The patient c/o nausea during morning encounter. She also c/o LLE pain. She denies any other acute complaint. 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 VS, labs ok this morning. PT/OT tomorrow. Continue close monitoring.     Assessment & Plan:     Principal Problem:  Displaced bimalleolar L ankle fracture  To OR today  Splint intact  Circulatory checks per unit standards  **per neuro recs, ASA 81 mg to be initiated post surgery for stroke prevention  PRN analgesia  PT/OT/PPD     Active Problems:  Chronic renal disease, stage III (HCC) [504224]  sCr stable  Continue to trend     Post-surgical hypothyroidism  Synthroid home dose resumed     Gastroparesis  Aware     Essential hypertension  Continue ARB     Type 1 diabetes mellitus (HCC)  Lantus 30 units nightly  Continue medium correction scale; this matches her home scale     HLD (hyperlipidemia)  Start atorvastatin 40 mg daily     Polyneuropathy  PT/OT eval  PPD ordered      Anticipated discharge needs: Pending      Diet:  Diet NPO  DVT PPx: per Ortho  Code status: Full Code    Hospital Problems:  Principal Problem:    Nondisp bimalleol fx l low leg, init for opn fx type I/2  Active Problems:    Chronic renal disease, stage III ([facility] [994475]    Post-surgical hypothyroidism    Gastroparesis    Essential hypertension    Type 1 diabetes mellitus (HCC)    HLD (hyperlipidemia)  Resolved Problems:    * No resolved hospital problems. *      Objective:   Patient Vitals for the past 24 hrs:   Temp Pulse Resp BP SpO2   10/03/22 0851 -- -- 20 -- --   10/03/22 0718 97.7 °F (36.5 °C) 81 18 134/66 93 %   10/03/22 0432 98 °F (36.7 °C) 76 16 (!) 140/56 94 %   10/03/22 0007 98.1 °F (36.7 °C) 81 16 (!) 141/63 100 %   10/02/22 1914 99.3 °F (37.4 °C) 83 18 (!) 172/98 97 %   10/02/22 1601 98.2 °F (36.8 °C) 80 20 (!) 152/69 97 %   10/02/22 1129 97.5 °F (36.4 °C) 65 18 (!) 123/58 96 %       Oxygen Therapy  SpO2: 93 %  Pulse Oximeter Device Mode: Continuous  O2 Device: None (Room air)    Estimated body mass index is 24.78 kg/m² as calculated from the following:    Height as of this encounter: 5' 8\" (1.727 m). Weight as of this encounter: 163 lb (73.9 kg). Intake/Output Summary (Last 24 hours) at 10/3/2022 1031  Last data filed at 10/3/2022 0237  Gross per 24 hour   Intake --   Output 1700 ml   Net -1700 ml         Physical Exam:   Blood pressure 134/66, pulse 81, temperature 97.7 °F (36.5 °C), temperature source Oral, resp. rate 20, height 5' 8\" (1.727 m), weight 163 lb (73.9 kg), SpO2 93 %. General:    No acute distress  Head:  Normocephalic, atraumatic  Eyes:  Sclerae appear normal.  Pupils equally round. ENT:  Nares appear normal, no drainage. Moist oral mucosa  Neck:  No restricted ROM. Trachea midline   CV:   RRR. murmur  Lungs:   CTAB. No wheezing, rhonchi, or rales. No tachypnea. Abdomen:   Soft, nondistended. Extremities: No cyanosis or clubbing. Skin:     No rashes and normal coloration. Warm and dry. Neuro:  CN II-XII grossly intact. A&Ox3  Psych:  Normal mood and affect.       I have personally reviewed labs and tests showing:  Recent Labs:  Recent Results (from the past 48 hour(s))   CBC with Auto Differential    Collection Time: 10/01/22  4:48 PM   Result Value Ref Range    WBC 9.4 4.3 - 11.1 K/uL    RBC 3.54 (L) 4.05 - 5.2 M/uL    Hemoglobin 11.7 11.7 - 15.4 g/dL    Hematocrit 34.5 (L) 35.8 - 46.3 %    MCV 97.5 79.6 - 97.8 FL    MCH 33.1 (H) 26.1 - 32.9 PG    MCHC 33.9 31.4 - 35.0 g/dL    RDW 13.2 11.9 - 14.6 %    Platelets 147 609 - 536 K/uL    MPV 9.7 9.4 - 12.3 FL    nRBC 0.00 0.0 - 0.2 K/uL    Differential Type AUTOMATED      Seg Neutrophils 80 (H) 43 - 78 %    Lymphocytes 9 (L) 13 - 44 %    Monocytes 11 4.0 - 12.0 %    Eosinophils % 0 (L) 0.5 - 7.8 %    Basophils 0 0.0 - 2.0 %    Immature Granulocytes 0 0.0 - 5.0 %    Segs Absolute 7.5 1.7 - 8.2 K/UL    Absolute Lymph # 0.8 0.5 - 4.6 K/UL    Absolute Mono # 1.1 0.1 - 1.3 K/UL    Absolute Eos # 0.0 0.0 - 0.8 K/UL    Basophils Absolute 0.0 0.0 - 0.2 K/UL    Absolute Immature Granulocyte 0.0 0.0 - 0.5 K/UL   Comprehensive Metabolic Panel    Collection Time: 10/01/22  4:48 PM   Result Value Ref Range    Sodium 133 (L) 136 - 145 mmol/L    Potassium 4.1 3.5 - 5.1 mmol/L    Chloride 101 101 - 110 mmol/L    CO2 28 21 - 32 mmol/L    Anion Gap 4 4 - 13 mmol/L    Glucose 149 (H) 65 - 100 mg/dL    BUN 20 8 - 23 MG/DL    Creatinine 0.80 0.6 - 1.0 MG/DL    GFR African American >60 >60 ml/min/1.73m2    GFR Non- >60 >60 ml/min/1.73m2    Calcium 8.9 8.3 - 10.4 MG/DL    Total Bilirubin 1.1 0.2 - 1.1 MG/DL    ALT 22 12 - 65 U/L    AST 27 15 - 37 U/L    Alk Phosphatase 65 50 - 136 U/L    Total Protein 6.7 6.3 - 8.2 g/dL    Albumin 3.7 3.2 - 4.6 g/dL    Globulin 3.0 2.3 - 3.5 g/dL    Albumin/Globulin Ratio 1.2 1.2 - 3.5     EKG 12 lead    Collection Time: 10/01/22  6:44 PM   Result Value Ref Range    Ventricular Rate 86 BPM    Atrial Rate 86 BPM    P-R Interval 142 ms    QRS Duration 82 ms    Q-T Interval 382 ms    QTc Calculation (Bazett) 457 ms    P Axis 60 degrees    R Axis 60 degrees    T Axis 83 degrees    Diagnosis Normal sinus rhythm    Troponin    Collection Time: 10/01/22  9:20 PM   Result Value Ref Range    Troponin, High Sensitivity 18.0 (H) 0 - 14 pg/mL   Sedimentation Rate    Collection Time: 10/01/22  9:20 PM   Result Value Ref Range    Sed Rate, Automated 6 0 - 30 mm/hr   Ammonia    Collection Time: 10/01/22  9:20 PM   Result Value Ref Range    Ammonia 35 (H) 11 - 32 UMOL/L   Vitamin B12    Collection Time: 10/01/22  9:20 PM   Result Value Ref Range    Vitamin B-12 629 193 - 986 pg/mL   Folate    Collection Time: 10/01/22  9:20 PM   Result Value Ref Range    Folate 10.6 3.1 - 17.5 ng/mL   Troponin    Collection Time: 10/02/22  1:27 AM   Result Value Ref Range    Troponin, High Sensitivity 13.9 0 - 14 pg/mL   Comprehensive Metabolic Panel w/ Reflex to MG    Collection Time: 10/02/22  4:45 AM   Result Value Ref Range    Sodium 134 (L) 136 - 145 mmol/L    Potassium 4.0 3.5 - 5.1 mmol/L    Chloride 100 (L) 101 - 110 mmol/L    CO2 30 21 - 32 mmol/L    Anion Gap 4 4 - 13 mmol/L    Glucose 163 (H) 65 - 100 mg/dL    BUN 22 8 - 23 MG/DL    Creatinine 0.80 0.6 - 1.0 MG/DL    GFR African American >60 >60 ml/min/1.73m2    GFR Non- >60 >60 ml/min/1.73m2    Calcium 8.7 8.3 - 10.4 MG/DL    Total Bilirubin 1.0 0.2 - 1.1 MG/DL    ALT 20 12 - 65 U/L    AST 22 15 - 37 U/L    Alk Phosphatase 62 50 - 136 U/L    Total Protein 6.2 (L) 6.3 - 8.2 g/dL    Albumin 3.2 3.2 - 4.6 g/dL    Globulin 3.0 2.3 - 3.5 g/dL    Albumin/Globulin Ratio 1.1 (L) 1.2 - 3.5     CBC with Auto Differential    Collection Time: 10/02/22  4:45 AM   Result Value Ref Range    WBC 7.0 4.3 - 11.1 K/uL    RBC 3.46 (L) 4.05 - 5.2 M/uL    Hemoglobin 11.3 (L) 11.7 - 15.4 g/dL    Hematocrit 34.3 (L) 35.8 - 46.3 %    MCV 99.1 (H) 79.6 - 97.8 FL    MCH 32.7 26.1 - 32.9 PG    MCHC 32.9 31.4 - 35.0 g/dL    RDW 13.2 11.9 - 14.6 %    Platelets 931 902 - 285 K/uL    MPV 9.6 9.4 - 12.3 FL    nRBC 0.00 0.0 - 0.2 K/uL    Differential Type AUTOMATED Seg Neutrophils 75 43 - 78 %    Lymphocytes 14 13 - 44 %    Monocytes 11 4.0 - 12.0 %    Eosinophils % 0 (L) 0.5 - 7.8 %    Basophils 0 0.0 - 2.0 %    Immature Granulocytes 0 0.0 - 5.0 %    Segs Absolute 5.2 1.7 - 8.2 K/UL    Absolute Lymph # 1.0 0.5 - 4.6 K/UL    Absolute Mono # 0.8 0.1 - 1.3 K/UL    Absolute Eos # 0.0 0.0 - 0.8 K/UL    Basophils Absolute 0.0 0.0 - 0.2 K/UL    Absolute Immature Granulocyte 0.0 0.0 - 0.5 K/UL   Protime-INR    Collection Time: 10/02/22  4:45 AM   Result Value Ref Range    Protime 12.7 12.6 - 14.5 sec    INR 0.9     APTT    Collection Time: 10/02/22  4:45 AM   Result Value Ref Range    PTT 29.7 24.1 - 35.1 SEC   Bilirubin, Direct    Collection Time: 10/02/22  4:45 AM   Result Value Ref Range    Bilirubin, Direct 0.3 <0.4 MG/DL   Troponin    Collection Time: 10/02/22  4:45 AM   Result Value Ref Range    Troponin, High Sensitivity 12.6 0 - 14 pg/mL   Lipid Panel    Collection Time: 10/02/22  4:45 AM   Result Value Ref Range    Cholesterol, Total 237 (H) <200 MG/DL    Triglycerides 101 35 - 150 MG/DL    HDL 98 (H) 40 - 60 MG/DL    LDL Calculated 118.8 (H) <100 MG/DL    VLDL Cholesterol Calculated 20.2 6.0 - 23.0 MG/DL    Chol/HDL Ratio 2.4     POCT Glucose    Collection Time: 10/02/22  3:58 PM   Result Value Ref Range    POC Glucose 194 (H) 65 - 100 mg/dL    Performed by: CarolannRIAN    CBC with Auto Differential    Collection Time: 10/03/22  4:56 AM   Result Value Ref Range    WBC 7.3 4.3 - 11.1 K/uL    RBC 3.46 (L) 4.05 - 5.2 M/uL    Hemoglobin 11.6 (L) 11.7 - 15.4 g/dL    Hematocrit 34.8 (L) 35.8 - 46.3 %    .6 (H) 79.6 - 97.8 FL    MCH 33.5 (H) 26.1 - 32.9 PG    MCHC 33.3 31.4 - 35.0 g/dL    RDW 13.0 11.9 - 14.6 %    Platelets 507 230 - 885 K/uL    MPV 9.5 9.4 - 12.3 FL    nRBC 0.00 0.0 - 0.2 K/uL    Differential Type AUTOMATED      Seg Neutrophils 75 43 - 78 %    Lymphocytes 12 (L) 13 - 44 %    Monocytes 11 4.0 - 12.0 %    Eosinophils % 2 0.5 - 7.8 %    Basophils 0 0.0 - 2.0 %    Immature Granulocytes 0 0.0 - 5.0 %    Segs Absolute 5.5 1.7 - 8.2 K/UL    Absolute Lymph # 0.9 0.5 - 4.6 K/UL    Absolute Mono # 0.8 0.1 - 1.3 K/UL    Absolute Eos # 0.2 0.0 - 0.8 K/UL    Basophils Absolute 0.0 0.0 - 0.2 K/UL    Absolute Immature Granulocyte 0.0 0.0 - 0.5 K/UL   Basic Metabolic Panel w/ Reflex to MG    Collection Time: 10/03/22  4:56 AM   Result Value Ref Range    Sodium 134 (L) 136 - 145 mmol/L    Potassium 3.9 3.5 - 5.1 mmol/L    Chloride 100 (L) 101 - 110 mmol/L    CO2 28 21 - 32 mmol/L    Anion Gap 6 4 - 13 mmol/L    Glucose 111 (H) 65 - 100 mg/dL    BUN 15 8 - 23 MG/DL    Creatinine 0.60 0.6 - 1.0 MG/DL    GFR African American >60 >60 ml/min/1.73m2    GFR Non- >60 >60 ml/min/1.73m2    Calcium 9.1 8.3 - 10.4 MG/DL   POCT Glucose    Collection Time: 10/03/22  8:29 AM   Result Value Ref Range    POC Glucose 114 (H) 65 - 100 mg/dL    Performed by: Jeannette Glover        I have personally reviewed imaging studies showing: Other Studies:  XR CHEST PORTABLE   Final Result   No evidence of an acute intrathoracic process. Mild left basilar atelectasis. CT HEAD WO CONTRAST   Final Result   Chronic appearing changes without acute intracranial abnormality. XR ANKLE LEFT (MIN 3 VIEWS)   Final Result   Fractures of the medial and lateral malleoli.           Current Meds:  Current Facility-Administered Medications   Medication Dose Route Frequency    atorvastatin (LIPITOR) tablet 40 mg  40 mg Oral Nightly    tuberculin injection 5 Units  5 Units IntraDERmal Once    oyster shell calcium w/D 500-200 MG-UNIT tablet 1 tablet  1 tablet Oral BID    carboxymethylcellulose (THERATEARS) 1 % ophthalmic gel 1 drop  1 drop Ophthalmic Daily PRN    insulin lispro (HUMALOG) injection vial 2 Units  2 Units SubCUTAneous 4x Daily AC & HS    insulin glargine (LANTUS) injection vial 30 Units  30 Units SubCUTAneous Nightly    levothyroxine (SYNTHROID) tablet 112 mcg  112 mcg Oral QAM AC    cetirizine (ZYRTEC) tablet 10 mg  10 mg Oral Daily PRN    losartan (COZAAR) tablet 100 mg  100 mg Oral Daily    miconazole (MICOTIN) 2 % powder   Topical BID    ondansetron (ZOFRAN-ODT) disintegrating tablet 8 mg  8 mg Oral Q8H PRN    pantoprazole (PROTONIX) tablet 40 mg  40 mg Oral Daily    ketotifen (ZADITOR) 0.025 % ophthalmic solution 1 drop  1 drop Both Eyes BID    pilocarpine (PILOCAR) 1 % ophthalmic solution 1 drop  1 drop Left Eye BID    sennosides-docusate sodium (SENOKOT-S) 8.6-50 MG tablet 1 tablet  1 tablet Oral Daily PRN    latanoprost (XALATAN) 0.005 % ophthalmic solution 1 drop  1 drop Both Eyes Nightly    sodium chloride flush 0.9 % injection 5-40 mL  5-40 mL IntraVENous 2 times per day    sodium chloride flush 0.9 % injection 5-40 mL  5-40 mL IntraVENous PRN    0.9 % sodium chloride infusion   IntraVENous PRN    polyethylene glycol (GLYCOLAX) packet 17 g  17 g Oral Daily PRN    acetaminophen (TYLENOL) tablet 650 mg  650 mg Oral Q6H PRN    Or    acetaminophen (TYLENOL) suppository 650 mg  650 mg Rectal Q6H PRN    ALPRAZolam (XANAX) tablet 0.25 mg  0.25 mg Oral BID PRN    glucose chewable tablet 16 g  4 tablet Oral PRN    dextrose bolus 10% 125 mL  125 mL IntraVENous PRN    Or    dextrose bolus 10% 250 mL  250 mL IntraVENous PRN    glucagon (rDNA) injection 1 mg  1 mg SubCUTAneous PRN    dextrose 10 % infusion   IntraVENous Continuous PRN    morphine injection 4 mg  4 mg IntraVENous Q4H PRN    HYDROcodone-acetaminophen (NORCO) 7.5-325 MG per tablet 1 tablet  1 tablet Oral Q6H PRN    insulin lispro (HUMALOG) injection vial 0-8 Units  0-8 Units SubCUTAneous TID WC    insulin lispro (HUMALOG) injection vial 0-4 Units  0-4 Units SubCUTAneous Nightly       Signed:  Zach Jensen, APRN - CNP    Part of this note may have been written by using a voice dictation software. The note has been proof read but may still contain some grammatical/other typographical errors.

## 2022-10-03 NOTE — PROGRESS NOTES
TRANSFER - IN REPORT:    Verbal report received from gumaro(name) on Maria Dolores Men  being received from OR(unit) for routine post-op      Report consisted of patients Situation, Background, Assessment and   Recommendations(SBAR). Information from the following report(s) Nurse Handoff Report was reviewed with the receiving nurse. Opportunity for questions and clarification was provided.       Assessment completed upon patients arrival to unit and care assume

## 2022-10-03 NOTE — PROGRESS NOTES
Patient awaiting surgical plan to address displaced bimalleolar ankle fracture. HOLD OT and await further orders from ortho post-op.    Crystal Mccarty, OT

## 2022-10-03 NOTE — ANESTHESIA PRE PROCEDURE
Department of Anesthesiology  Preprocedure Note       Name:  Isabella David   Age:  78 y.o.  :  1955                                          MRN:  [id number]         Date:  10/3/2022      Surgeon: Dell Gavin):  Allison Macias MD    Procedure: Procedure(s):  LEFT ANKLE OPEN REDUCTION INTERNAL FIXATION    Medications prior to admission:   Prior to Admission medications    Medication Sig Start Date End Date Taking? Authorizing Provider   HUMALOG KWIKPEN 100 UNIT/ML SOPN Inject into the skin 2 units before meals plus correction scale 2/50>200, max daily dose 30 units 22   Chin Valdez PA-C   Methylcobalamin (METHYL B-12 PO) Take 1,000 mcg by mouth daily.     Historical Provider, MD STEVENS ULTRAFINE III SHORT PEN 31G X 8 MM MISC USE 1 PEN NEEDLE TO INJECT INSULIN FIVE TIMES DAILY 22   Historical Provider, MD   ketoconazole (NIZORAL) 2 % shampoo USE SHAMPOO TO [address] SCALP 2-3 TIMES A WEEK, LET SIT 15 MINUTES PRIOR TO RINSING 22   Historical Provider, MD   Contact Lens Care Products [facility] MISC Take 1 tablet by mouth 3 times daily    Historical Provider, MD   hydrocortisone 2.5 % cream APPLY TOPICALLY TO FACE TWICE DAILY AS NEEDED FOR ITCHING 6/10/22   Historical Provider, MD   PATADAY 0.2 % SOLN ophthalmic solution INSTILL 1 DROP EVERY DAY INTO BOTH EYES 5/10/22   Historical Provider, MD   TURMERIC PO Take 700 mg by mouth daily    Ar Automatic Reconciliation   Alpha-Lipoic Acid 600 MG CAPS Take 1 tablet by mouth 2 times daily    Ar Automatic Reconciliation   Calcium Carbonate-Vitamin D (CALCIUM-VITAMIN D) 600-125 MG-UNIT TABS Take 1 tablet by mouth 2 times daily    Ar Automatic Reconciliation   Carboxymethylcellulose Sodium 0.25 % SOLN Apply 1 drop to eye daily as needed (glaucoma)    Ar Automatic Reconciliation   Glucagon (GVOKE HYPOPEN 2-PACK) 1 MG/0.2ML SOAJ Inject 1 mg into the skin as needed 10/5/21   Ar Automatic Reconciliation   insulin aspart (NOVOLOG FLEXPEN) 100 UNIT/ML injection pen Inject into the skin 2 units before meals plus correction scale 2/50>200, max daily dose 30 units 9/18/20   Ar Automatic Reconciliation   insulin glargine (LANTUS SOLOSTAR) 100 UNIT/ML injection pen Inject 30 Units into the skin nightly 10/5/21   Ar Automatic Reconciliation   levothyroxine (SYNTHROID) 112 MCG tablet Take 112 mcg by mouth every morning (before breakfast) 12/28/21   Ar Automatic Reconciliation   loratadine (CLARITIN) 10 MG tablet Take 10 mg by mouth daily as needed    Ar Automatic Reconciliation   losartan (COZAAR) 100 MG tablet Take 100 mg by mouth daily 12/28/21   Ar Automatic Reconciliation   Lutein-Zeaxanthin 20-1 MG CAPS Take 25 mg by mouth daily    Ar Automatic Reconciliation   nystatin (MYCOSTATIN) 014962 UNIT/GM powder Apply topically 4 times daily 7/24/19   Ar Automatic Reconciliation   ondansetron (ZOFRAN-ODT) 8 MG TBDP disintegrating tablet Take 8 mg by mouth every 8 hours as needed for Nausea or Vomiting 10/7/20   Ar Automatic Reconciliation   pantoprazole (PROTONIX) 40 MG tablet Take 40 mg by mouth daily 7/31/18   Ar Automatic Reconciliation   pilocarpine (PILOCAR) 1 % ophthalmic solution INSTILL 1 DROP INTO THE LEFT EYE TWICE DAILY 8/31/21   Ar Automatic Reconciliation   senna-docusate (PERICOLACE) 8.6-50 MG per tablet Take 1 tablet by mouth daily as needed    Ar Automatic Reconciliation   Travoprost, BAK Free, (TRAVATAN Z) 0.004 % SOLN ophthalmic solution Place 1 drop into both eyes nightly    Ar Automatic Reconciliation       Current medications:    Current Facility-Administered Medications   Medication Dose Route Frequency Provider Last Rate Last Admin    atorvastatin (LIPITOR) tablet 40 mg  40 mg Oral Nightly Daneeen Sydni, APRN - CNP        lidocaine 1 % injection 1 mL  1 mL IntraDERmal Once PRN Emily Singh MD        fentaNYL (SUBLIMAZE) injection 100 mcg  100 mcg IntraVENous Once PRN Emily Singh MD        lactated ringers infusion   IntraVENous Continuous Emily Singh MD 100 mL/hr at 10/03/22 1515 New Bag at 10/03/22 1515    sodium chloride flush 0.9 % injection 5-40 mL  5-40 mL IntraVENous 2 times per day Ortiz Sims MD        sodium chloride flush 0.9 % injection 5-40 mL  5-40 mL IntraVENous PRN Ortiz Sims MD        0.9 % sodium chloride infusion   IntraVENous PRN Ortiz Sims MD        ceFAZolin (ANCEF) 2000 mg in sterile water 20 mL IV syringe  2,000 mg IntraVENous On Call to 1100 Gopi Jaramillo MD        tuberculin injection 5 Units  5 Units IntraDERmal Once Perctuan Owen, APRN - CNP        oyster shell calcium w/D 500-200 MG-UNIT tablet 1 tablet  1 tablet Oral BID Jerri Greenwood MD   1 tablet at 10/03/22 0855    carboxymethylcellulose (THERATEARS) 1 % ophthalmic gel 1 drop  1 drop Ophthalmic Daily PRN Jerri Greenwood MD        insulin lispro (HUMALOG) injection vial 2 Units  2 Units SubCUTAneous 4x Daily AC & HS Jerri Greenwood MD   2 Units at 10/03/22 0729    insulin glargine (LANTUS) injection vial 30 Units  30 Units SubCUTAneous Nightly Jerri Greenwood MD   30 Units at 10/02/22 2023    levothyroxine (SYNTHROID) tablet 112 mcg  112 mcg Oral QAM AC Jerri Greenwood MD   112 mcg at 10/03/22 0534    cetirizine (ZYRTEC) tablet 10 mg  10 mg Oral Daily PRN Jerri Greenwood MD        losartan (COZAAR) tablet 100 mg  100 mg Oral Daily Jerri Greenwood MD   100 mg at 10/02/22 0917    miconazole (MICOTIN) 2 % powder   Topical BID Jerri Greenwood MD   Given at 10/03/22 0853    ondansetron (ZOFRAN-ODT) disintegrating tablet 8 mg  8 mg Oral Q8H PRN Jerri Greenwood MD   8 mg at 10/03/22 0848    pantoprazole (PROTONIX) tablet 40 mg  40 mg Oral Daily Jerri Greenwood MD   40 mg at 10/03/22 0848    ketotifen (ZADITOR) 0.025 % ophthalmic solution 1 drop  1 drop Both Eyes BID Jerri Greenwood MD        pilocarpine [facility]) 1 % ophthalmic solution 1 drop  1 drop Left Eye BID Jerri Greenwood MD        sennosides-docusate sodium (SENOKOT-S) 8.6-50 MG tablet 1 tablet 1 tablet Oral Daily PRN Cayla Morales MD        latanoprost (XALATAN) 0.005 % ophthalmic solution 1 drop  1 drop Both Eyes Nightly Cayla Morales MD   1 drop at 10/02/22 2026    sodium chloride flush 0.9 % injection 5-40 mL  5-40 mL IntraVENous 2 times per day Cayla Morales MD   5 mL at 10/03/22 0746    sodium chloride flush 0.9 % injection 5-40 mL  5-40 mL IntraVENous PRN Cayla Morales MD        0.9 % sodium chloride infusion   IntraVENous PRN Cayla Morales MD        polyethylene glycol [facility]) packet 17 g  17 g Oral Daily PRN Cayla Morlaes MD        acetaminophen (TYLENOL) tablet 650 mg  650 mg Oral Q6H PRN Cayla Morales MD        Or    acetaminophen (TYLENOL) suppository 650 mg  650 mg Rectal Q6H PRN Cayla Morales MD        ALPRAZolam [place]) tablet 0.25 mg  0.25 mg Oral BID PRN Cayla Morales MD   0.25 mg at 10/02/22 1110    glucose chewable tablet 16 g  4 tablet Oral PRN Cayla Morales MD        dextrose bolus 10% 125 mL  125 mL IntraVENous PRN Cayla Morales MD        Or    dextrose bolus 10% 250 mL  250 mL IntraVENous PRN Cayla Morales MD        glucagon (rDNA) injection 1 mg  1 mg SubCUTAneous PRN Cayla Morales MD        dextrose 10 % infusion   IntraVENous Continuous PRN Cayla Morales MD        morphine injection 4 mg  4 mg IntraVENous Q4H PRN Cayla Morales MD   4 mg at 10/03/22 0851    HYDROcodone-acetaminophen (Tate Barthel) 7.5-325 MG per tablet 1 tablet  1 tablet Oral Q6H PRN Cayla Morales MD   1 tablet at 10/02/22 0940    insulin lispro (HUMALOG) injection vial 0-8 Units  0-8 Units SubCUTAneous TID WC Cayla Morales MD        insulin lispro (HUMALOG) injection vial 0-4 Units  0-4 Units SubCUTAneous Nightly Cayla Morales MD           Allergies:     Allergies   Allergen Reactions    Lisinopril Other (See Comments)    Mirabegron Hives    Buspirone Palpitations       Problem List:    Patient Active Problem List   Diagnosis Code    Retinopathy due to secondary diabetes ([facility] E13.319    Post-surgical hypothyroidism E89.0    Vaginal atrophy N95.2    Screening for breast cancer Z12.39    History of Graves' disease Z86.39    Chronic pain G89.29    Osteoporosis M81.0    Gastroparesis K31.84    Essential hypertension I10    GERD (gastroesophageal reflux disease) K21.9    Urethral caruncle N36.2    Hx of colon cancer, stage I Z85.038    Benign paroxysmal positional vertigo H81.10    Type 2 diabetes with nephropathy (HCC) E11.21    Diabetic polyneuropathy associated with type 1 diabetes mellitus (HCC) E10.42    Personal history of malignant neoplasm of breast Z85.3    Osteopenia M85.80    Type 1 diabetes mellitus (HCC) E10.9    HLD (hyperlipidemia) E78.5    Mixed incontinence N39.46    Chronic renal disease, stage III (HCC) [796057] N18.30    Nondisp bimalleol fx l low leg, init for opn fx type I/2 S82.845B       Past Medical History:        Diagnosis Date    Benign paroxysmal positional vertigo     Breast cancer ([facility] 12/2010    Cancer ([facility] 2010    lt breast    Cancer ([facility] 1988    colon    Chronic pain     Chronic renal disease, stage III [facility]) [990375] 6/29/2022    Diabetic polyneuropathy associated with type 1 diabetes mellitus ([address] 1/13/2020    Gastroparesis     GERD (gastroesophageal reflux disease)     medicaton controlled    Glaucoma     Hiatal hernia     HLD (hyperlipidemia)     Hypertension     medication controlled    Neuropathy, diabetic ([address]     Osteoporosis, unspecified     Personal history of malignant neoplasm of breast     Retinopathy due to secondary diabetes ([address]     Thyroid disease     medication controlled    Type 1 diabetes ([address] 5years of age    insulin, Hemoglobin A1c 6.0 on 7/25/18.      Unspecified hereditary and idiopathic peripheral neuropathy     Unspecified hypothyroidism        Past Surgical History:        Procedure Laterality Date    COLONOSCOPY  2009    COLONOSCOPY N/A 8/21/2018 COLONOSCOPY  BMI 25 performed by Rayshawn Calixto DO at [address] [facility] Right     eye stent for gluacoma     HYSTERECTOMY, TOTAL ABDOMINAL (CERVIX REMOVED)  1990    vaginal for dysplasia    MASTECTOMY Left 12/02/2010    Radical mestectomy    THYROIDECTOMY  1986    goiter/graves disease    TOTAL COLECTOMY  1988    Colon ca    TUBAL LIGATION  1970\"s    due to diabetes    VITRECTOMY Right 2000    hemmorrhage       Social History:    Social History     Tobacco Use    Smoking status: Never    Smokeless tobacco: Never   Substance Use Topics    Alcohol use: No                                Counseling given: Not Answered      Vital Signs (Current):   Vitals:    10/03/22 0718 10/03/22 0851 10/03/22 1101 10/03/22 1452   BP: 134/66  (!) 151/75 (!) 194/79   Pulse: 81  81 86   Resp: 18 20 20 20   Temp: 97.7 °F (36.5 °C)  98.3 °F (36.8 °C) 98.3 °F (36.8 °C)   TempSrc: Oral  Oral Oral   SpO2: 93%  98% 96%   Weight:    163 lb (73.9 kg)   Height:    5' 8\" (1.727 m)                                              BP Readings from Last 3 Encounters:   10/03/22 (!) 194/79   07/27/22 122/76   07/22/22 115/70       NPO Status: Time of last liquid consumption: 0900 (small sip with meds)                        Time of last solid consumption: 2100                        Date of last liquid consumption: 10/03/22                        Date of last solid food consumption: 10/02/22    BMI:   Wt Readings from Last 3 Encounters:   10/03/22 163 lb (73.9 kg)   06/29/22 166 lb (75.3 kg)   05/03/22 166 lb (75.3 kg)     Body mass index is 24.78 kg/m².     CBC:   Lab Results   Component Value Date/Time    WBC 7.3 10/03/2022 04:56 AM    RBC 3.46 10/03/2022 04:56 AM    HGB 11.6 10/03/2022 04:56 AM    HCT 34.8 10/03/2022 04:56 AM    .6 10/03/2022 04:56 AM    RDW 13.0 10/03/2022 04:56 AM     10/03/2022 04:56 AM       CMP:   Lab Results   Component Value Date/Time     10/03/2022 04:56 AM    K 3.9 10/03/2022 04:56 AM  10/03/2022 04:56 AM    CO2 28 10/03/2022 04:56 AM    BUN 15 10/03/2022 04:56 AM    CREATININE 0.60 10/03/2022 04:56 AM    GFRAA >60 10/03/2022 04:56 AM    AGRATIO 1.8 12/21/2021 09:40 AM    LABGLOM >60 10/03/2022 04:56 AM    GLUCOSE 111 10/03/2022 04:56 AM    PROT 6.2 10/02/2022 04:45 AM    CALCIUM 9.1 10/03/2022 04:56 AM    BILITOT 1.0 10/02/2022 04:45 AM    ALKPHOS 62 10/02/2022 04:45 AM    ALKPHOS 62 12/21/2021 09:40 AM    AST 22 10/02/2022 04:45 AM    ALT 20 10/02/2022 04:45 AM       POC Tests:   Recent Labs     10/03/22  1133   POCGLU 111*       Coags:   Lab Results   Component Value Date/Time    PROTIME 12.7 10/02/2022 04:45 AM    INR 0.9 10/02/2022 04:45 AM    APTT 29.7 10/02/2022 04:45 AM       HCG (If Applicable): No results found for: PREGTESTUR, PREGSERUM, HCG, HCGQUANT     ABGs: No results found for: PHART, PO2ART, GIX1YYN, JKI0OPS, BEART, H7JWBQLW     Type & Screen (If Applicable):  No results found for: LABABO, LABRH    Drug/Infectious Status (If Applicable):  No results found for: HIV, HEPCAB    COVID-19 Screening (If Applicable): No results found for: COVID19        Anesthesia Evaluation  Patient summary reviewed and Nursing notes reviewed  Airway: Mallampati: II  TM distance: >3 FB   Neck ROM: full  Mouth opening: > = 3 FB   Dental: normal exam         Pulmonary:normal exam  breath sounds clear to auscultation                             Cardiovascular:  Exercise tolerance: good (>4 METS),   (+) hypertension:,         Rhythm: regular  Rate: normal                    Neuro/Psych:   (+) neuromuscular disease (Neuropathy ):,             GI/Hepatic/Renal:   (+) hiatal hernia, GERD: well controlled,           Endo/Other:    (+) Diabetesusing insulin, hypothyroidism::., .                 Abdominal:             Vascular: Other Findings:           Anesthesia Plan      general     ASA 3     (Ga with Ett  Refuses PNB)  Induction: intravenous.       Anesthetic plan and risks discussed with patient.                         Maksim Garcia MD   10/3/2022

## 2022-10-03 NOTE — ANESTHESIA POSTPROCEDURE EVALUATION
Department of Anesthesiology  Postprocedure Note    Patient: Hannah Rodriguez  MRN: [id number]  YOB: 1955  Date of evaluation: 10/3/2022      Procedure Summary     Date: 10/03/22 Room / Location: [facility] MAIN OR  / [facility] MAIN OR    Anesthesia Start: 1605 Anesthesia Stop:     Procedure: LEFT ANKLE OPEN REDUCTION INTERNAL FIXATION (Left: Hip) Diagnosis:       Bimalleolar fracture of left ankle, closed, initial encounter      (Bimalleolar fracture of left ankle, closed, initial encounter [G83.642A])    Providers: Virginia You MD Responsible Provider: Annmarie Cm MD    Anesthesia Type: General ASA Status: 3          Anesthesia Type: General    Ash Phase I: Ash Score: 10    Ash Phase II:        Anesthesia Post Evaluation    Patient location during evaluation: PACU  Patient participation: complete - patient participated  Level of consciousness: awake and alert  Airway patency: patent  Nausea & Vomiting: no nausea and no vomiting  Complications: no  Cardiovascular status: hemodynamically stable  Respiratory status: acceptable, nonlabored ventilation and spontaneous ventilation  Hydration status: euvolemic  Comments: BP (!) 178/82   Pulse 95   Temp 97 °F (36.1 °C) (Temporal)   Resp 16   Ht 5' 8\" (1.727 m)   Wt 163 lb (73.9 kg)   SpO2 100%   BMI 24.78 kg/m²     Multimodal analgesia pain management approach

## 2022-10-03 NOTE — INTERVAL H&P NOTE
Update History & Physical    The Patient's History and Physical of 10/1/2022 was reviewed with the patient and I examined the patient. There was no change. The surgical site was confirmed by the patient and me. Plan:  The risk, benefits, expected outcome, and alternative to the recommended procedure have been discussed with the patient. Patient understands and wants to proceed with open treatment of left bimalleolar ankle fracture with screw fixation.     Electronically signed by Oval Eisenmenger, MD on 10/3/2022 at 12:19 PM

## 2022-10-03 NOTE — PERIOP NOTE
TRANSFER - OUT REPORT:    Verbal report given to Al RN5 on Kacy Conte  being transferred to  for routine post-op       Report consisted of patients Situation, Background, Assessment and   Recommendations(SBAR). Information from the following report(s) Nurse Handoff Report, Adult Overview, Surgery Report, Intake/Output, and MAR was reviewed with the receiving nurse. Lines:   Peripheral IV 10/01/22 Right;Ventral Forearm (Active)   Site Assessment Clean, dry & intact 10/03/22 1702   Line Status Infusing 10/03/22 1702   Line Care Connections checked and tightened 10/03/22 1702   Phlebitis Assessment No symptoms 10/03/22 1702   Infiltration Assessment 0 10/03/22 1702   Alcohol Cap Used No 10/03/22 1702   Dressing Status Clean, dry & intact 10/03/22 1702   Dressing Type Transparent; Other (Comment) 10/03/22 1702        Opportunity for questions and clarification was provided. Patient transported with:   O2 @ 2 liters    VTE prophylaxis orders have been written for Kacy Conte. Patient and family given floor number and nurses name. Family updated re: pt status after security code verified.

## 2022-10-03 NOTE — OP NOTE
Operative Report    Patient: Glenna Barney MRN: [id number]  SSN: [id number]    YOB: 1955  Age: 78 y.o. Sex: female       Date of Surgery: October 3, 2022     History:  Glenna Barney is a 78 y.o. female who fell and injured her left ankle. She was seen and found to have a completely displaced left bimalleolar ankle fracture. There was some residual lateral translation of both the medial lateral malleoli as well as the talus itself. She does have some rather significant diabetes. I talked to her about different treatment options. After talking her about different things I did explain to her that I thought the most reasonable thing would be to treat this with open treatment since it is a bimalleolar fracture and it would be very difficult to treat with just cast immobilization. After hearing all of this she seemed to feel comfortable consenting. I talked to the patient and/or their representative and explained the exact nature the procedure. I also went through a detailed list of the material risks associated with  the procedure which included risk of bleeding, infection, injury to nearby structures, worsening the situation, as well as the risks associate with anesthesia and finally death. Also talked with him regarding the benefits and alternatives to the procedure. Preoperative Diagnosis: Bimalleolar fracture of left ankle, closed, initial encounter [W85.861N]     Postoperative Diagnosis:   Closed displaced left bimalleolar ankle fracture      Surgeon(s) and Role:     * Flavio Daily MD - Primary    Anesthesia: General     Procedure: Open treatment of left bimalleolar ankle fracture with internal fixation    Procedure in Detail: Successful duction of general anesthetic the left lower extremities prepped draped usual sterile fashion. Once this was done I made a small incision distally and placed a 3.5 mm tap into the distal fragment of the lateral malleolus.   After doing this I was able to use this as essentially a joystick to pull traction to manipulate this into appropriate position. Once I had this reduction maneuver done so that the ankle is more well aligned about was then able to place a guidewire for the [employer]ynthe[employer] 4.0 cannulated screw set from the lateral malleolus into the distal tibia and this was essentially posterior lateral to anterior medial and after the guidewire was in appropriate position I placed the actual screw while holding traction with a 3.5 mm tap and this afforded a very nice reduction and make sure the fibula was out to length as the syndesmosis screw reduced it. Once this was in place I removed the 3.5 mm tap from laterally list and then placed 2.5 mm drill bit down the shaft of the fibula and the intramedullary space once I confirmed this was in appropriate position I removed this and placed 110 mm 3.5 mm small fragment screw as an intramedullary screw. This seemed to afford very nice fixation of the lateral malleolus. At that point the medial malleolus had been essentially reduced with fixation of the lateral side so then placed 2 guidewires 1 a little more posterior one little more anterior and placed two 4.0 cannulated screws across the medial malleolar fragment after doing this I remove the cannulated screws checked the mortise view as well as my lateral view was very pleased with this. I had a very good reduction of the I was pleased with the hardware position I then closed my incision with staples only and placed dressings over the wounds and then placed the patient in a short leg cast.  She was then awakened taken cover him in stable condition      Estimated Blood Loss: 30 cc    Tourniquet Time: * No tourniquets in log *      Implants:   Implant Name Type Inv. Item Serial No.  Lot No. LRB No. Used Action   SCREW BNE L110MM DIA3. 5MM HD DIA6MM ANÍBAL S STL ST W/ SM HEX - [id number]  SCREW BNE L110MM DIA3. 5MM HD DIA6MM ANÍBAL S STL ST W/ SM HEX Glenn Rochaa [place] [id number] Left 1 Implanted   SCREW BNE L40MM DIA4MM S STL CHET SHT 1/3 THRD SM HEX SOCK - [id number]  SCREW BNE L40MM DIA4MM S STL CHET SHT 1/3 THRD SM HEX SOCK  [employer] [place] [id number] Left 1 Implanted   SCREW BNE L42MM DIA4MM S STL CHET SHT 1/3 THRD SM HEX SOCK - [id number]  SCREW BNE L42MM DIA4MM S STL CHET SHT 1/3 THRD SM HEX SOCK  [employer] [place] [id number] Left 1 Implanted   SCREW BNE L42MM DIA4MM S STL CHET SHT 1/3 THRD SM HEX SOCK - [id number]  SCREW BNE L42MM DIA4MM S STL CHET SHT 1/3 THRD SM HEX SOCK  DEPU[employer] [place] [id number] Left 1 Implanted               Specimens: * No specimens in log *        Drains: None                Complications: None    Counts: Sponge and needle counts were correct times two.     Signed By:  Maddy Magallanes MD     October 3, 2022

## 2022-10-04 LAB
ANION GAP SERPL CALC-SCNC: 6 MMOL/L (ref 4–13)
BUN SERPL-MCNC: 10 MG/DL (ref 8–23)
CALCIUM SERPL-MCNC: 9 MG/DL (ref 8.3–10.4)
CHLORIDE SERPL-SCNC: 103 MMOL/L (ref 101–110)
CO2 SERPL-SCNC: 28 MMOL/L (ref 21–32)
CREAT SERPL-MCNC: 0.6 MG/DL (ref 0.6–1)
ERYTHROCYTE [DISTWIDTH] IN BLOOD BY AUTOMATED COUNT: 12.8 % (ref 11.9–14.6)
GLUCOSE BLD STRIP.AUTO-MCNC: 89 MG/DL (ref 65–100)
GLUCOSE SERPL-MCNC: 112 MG/DL (ref 65–100)
HCT VFR BLD AUTO: 35.2 % (ref 35.8–46.3)
HGB BLD-MCNC: 11.6 G/DL (ref 11.7–15.4)
MCH RBC QN AUTO: 33 PG (ref 26.1–32.9)
MCHC RBC AUTO-ENTMCNC: 33 G/DL (ref 31.4–35)
MCV RBC AUTO: 100.3 FL (ref 79.6–97.8)
NRBC # BLD: 0 K/UL (ref 0–0.2)
PLATELET # BLD AUTO: 295 K/UL (ref 150–450)
PMV BLD AUTO: 9.7 FL (ref 9.4–12.3)
POTASSIUM SERPL-SCNC: 4 MMOL/L (ref 3.5–5.1)
RBC # BLD AUTO: 3.51 M/UL (ref 4.05–5.2)
SERVICE CMNT-IMP: NORMAL
SODIUM SERPL-SCNC: 137 MMOL/L (ref 136–145)
WBC # BLD AUTO: 7 K/UL (ref 4.3–11.1)

## 2022-10-04 PROCEDURE — 85027 COMPLETE CBC AUTOMATED: CPT

## 2022-10-04 PROCEDURE — 36415 COLL VENOUS BLD VENIPUNCTURE: CPT

## 2022-10-04 PROCEDURE — 80048 BASIC METABOLIC PNL TOTAL CA: CPT

## 2022-10-04 PROCEDURE — 6360000002 HC RX W HCPCS: Performed by: NURSE PRACTITIONER

## 2022-10-04 PROCEDURE — 97535 SELF CARE MNGMENT TRAINING: CPT

## 2022-10-04 PROCEDURE — 2580000003 HC RX 258: Performed by: ORTHOPAEDIC SURGERY

## 2022-10-04 PROCEDURE — 97112 NEUROMUSCULAR REEDUCATION: CPT

## 2022-10-04 PROCEDURE — 6360000002 HC RX W HCPCS: Performed by: ORTHOPAEDIC SURGERY

## 2022-10-04 PROCEDURE — 82962 GLUCOSE BLOOD TEST: CPT

## 2022-10-04 PROCEDURE — 97162 PT EVAL MOD COMPLEX 30 MIN: CPT

## 2022-10-04 PROCEDURE — 6370000000 HC RX 637 (ALT 250 FOR IP): Performed by: ORTHOPAEDIC SURGERY

## 2022-10-04 PROCEDURE — 97530 THERAPEUTIC ACTIVITIES: CPT

## 2022-10-04 PROCEDURE — 1100000003 HC PRIVATE W/ TELEMETRY

## 2022-10-04 PROCEDURE — 97166 OT EVAL MOD COMPLEX 45 MIN: CPT

## 2022-10-04 RX ORDER — ENOXAPARIN SODIUM 100 MG/ML
40 INJECTION SUBCUTANEOUS DAILY
Status: DISCONTINUED | OUTPATIENT
Start: 2022-10-04 | End: 2022-10-06 | Stop reason: HOSPADM

## 2022-10-04 RX ORDER — ASPIRIN 81 MG/1
81 TABLET, CHEWABLE ORAL NIGHTLY
Status: DISCONTINUED | OUTPATIENT
Start: 2022-10-04 | End: 2022-10-06 | Stop reason: HOSPADM

## 2022-10-04 RX ADMIN — SODIUM CHLORIDE, PRESERVATIVE FREE 10 ML: 5 INJECTION INTRAVENOUS at 21:31

## 2022-10-04 RX ADMIN — MORPHINE SULFATE 4 MG: 4 INJECTION INTRAVENOUS at 10:17

## 2022-10-04 RX ADMIN — ONDANSETRON 8 MG: 4 TABLET, ORALLY DISINTEGRATING ORAL at 10:19

## 2022-10-04 RX ADMIN — LOSARTAN POTASSIUM 100 MG: 50 TABLET, FILM COATED ORAL at 09:27

## 2022-10-04 RX ADMIN — INSULIN LISPRO 2 UNITS: 100 INJECTION, SOLUTION INTRAVENOUS; SUBCUTANEOUS at 08:13

## 2022-10-04 RX ADMIN — Medication 1 TABLET: at 21:29

## 2022-10-04 RX ADMIN — ENOXAPARIN SODIUM 40 MG: 100 INJECTION SUBCUTANEOUS at 10:24

## 2022-10-04 RX ADMIN — INSULIN LISPRO 2 UNITS: 100 INJECTION, SOLUTION INTRAVENOUS; SUBCUTANEOUS at 16:44

## 2022-10-04 RX ADMIN — Medication 1 TABLET: at 09:27

## 2022-10-04 RX ADMIN — INSULIN LISPRO 2 UNITS: 100 INJECTION, SOLUTION INTRAVENOUS; SUBCUTANEOUS at 21:30

## 2022-10-04 RX ADMIN — INSULIN LISPRO 2 UNITS: 100 INJECTION, SOLUTION INTRAVENOUS; SUBCUTANEOUS at 12:06

## 2022-10-04 RX ADMIN — LEVOTHYROXINE SODIUM 112 MCG: 0.11 TABLET ORAL at 05:08

## 2022-10-04 RX ADMIN — INSULIN GLARGINE 30 UNITS: 100 INJECTION, SOLUTION SUBCUTANEOUS at 21:29

## 2022-10-04 RX ADMIN — PANTOPRAZOLE SODIUM 40 MG: 40 TABLET, DELAYED RELEASE ORAL at 09:27

## 2022-10-04 RX ADMIN — SODIUM CHLORIDE, PRESERVATIVE FREE 5 ML: 5 INJECTION INTRAVENOUS at 09:31

## 2022-10-04 ASSESSMENT — PAIN SCALES - GENERAL
PAINLEVEL_OUTOF10: 0
PAINLEVEL_OUTOF10: 6
PAINLEVEL_OUTOF10: 8

## 2022-10-04 ASSESSMENT — PAIN DESCRIPTION - ORIENTATION
ORIENTATION: LEFT
ORIENTATION: LEFT

## 2022-10-04 ASSESSMENT — PAIN DESCRIPTION - LOCATION
LOCATION: ANKLE
LOCATION: ANKLE

## 2022-10-04 ASSESSMENT — PAIN DESCRIPTION - DESCRIPTORS: DESCRIPTORS: ACHING

## 2022-10-04 NOTE — PROGRESS NOTES
ACUTE PHYSICAL THERAPY GOALS:   (Developed with and agreed upon by patient and/or caregiver.)    (1.) Isabel Piña  will move from supine to sit and sit to supine , scoot up and down, and roll side to side with MODIFIED INDEPENDENCE within 7 treatment day(s). (2.) Isabel Piña will transfer from bed to chair and chair to bed with SUPERVISION using the least restrictive device within 7 treatment day(s). (3.) Isabel Piña will perform w/c mobility in room/hallway for 100 ft at SUPERVISION within 7 treatment days  (4.) Isabel Piña will perform bilateral lower extremity exercises x 15 min for HEP with MODIFIED INDEPENDENCE to improve strength, endurance, and functional mobility within 7 treatment day(s). PHYSICAL THERAPY Initial Assessment, Daily Note, and AM  (Link to Caseload Tracking: PT Visit Days : 1  Acknowledge Orders  Time In/Out  PT Charge Capture  Rehab Caseload Tracker    WBAT FOR TRANSFERS ONLY    Isabel Piña is a 75 y.o. female   PRIMARY DIAGNOSIS: Nondisp bimalleol fx l low leg, init for opn fx type I/2  Closed bimalleolar fracture of left ankle, initial encounter [S82.842A]  Nondisp bimalleol fx l low leg, init for opn fx type I/2 [S82.845B]  Procedure(s) (LRB):  LEFT ANKLE OPEN REDUCTION INTERNAL FIXATION (Left)  1 Day Post-Op  Reason for Referral: Generalized Muscle Weakness (M62.81)  Difficulty in walking, Not elsewhere classified (R26.2)  History of falling (Z91.81)  Inpatient: Payor: Lady Marina / Plan: Wilfrid Hale PPO / Product Type: Medicare /     ASSESSMENT:     REHAB RECOMMENDATIONS:   Recommendation to date pending progress:  Setting:  Inpatient Rehab Facility    Equipment:    To Be Determined     ASSESSMENT:  Ms. Gaudencio Nichols is a 75year old female who presents following a fall resulting in a bimalleolar fracture. She is now s/p internal fixation with cast placed below the knee. She is WBAT as tolerated for transfers only at this time.  Prior to admission she was living alone and independent with mobility using a rollator. This date pt performs mobility including bed mobility and transfers with minAx2 using 2WW for support. Patient is initially anxious with mobility requiring extra cueing and reassurance, but improves throughout. Pt presents as functioning below her baseline, with deficits in mobility including transfers, strength, balance, and activity tolerance. Pt motivated to improve and will benefit from skilled therapy services to address stated deficits to promote return to highest level of function, independence, and safety. Will continue to follow.      [address]\"" [address] [address] AM-PAC 6 Clicks Basic Mobility Inpatient Short Form  AM-PAC Mobility Inpatient   How much difficulty turning over in bed?: A Little  How much difficulty sitting down on / standing up from a chair with arms?: A Lot  How much difficulty moving from lying on back to sitting on side of bed?: A Little  How much help from another person moving to and from a bed to a chair?: A Lot  How much help from another person needed to walk in hospital room?: Total  How much help from another person for climbing 3-5 steps with a railing?: Total  AM-PAC Inpatient Mobility Raw Score : 12  AM-PAC Inpatient T-Scale Score : 35.33  Mobility Inpatient CMS 0-100% Score: 68.66  Mobility Inpatient CMS G-Code Modifier : CL    SUBJECTIVE:   Ms. Courtney Mckee states, \"It feels good to be up out of bed\"     Social/Functional Lives With: Alone  Type of Home: House  Home Layout: One level  Home Access: Stairs to enter with rails  Entrance Stairs - Number of Steps: 1  Entrance Stairs - Rails: None  Home Equipment: Sherl Nadeem, howard  Has the patient had two or more falls in the past year or any fall with injury in the past year?: Yes  Receives Help From: Family  ADL Assistance: Independent  Homemaking Assistance: Independent  Ambulation Assistance: Independent  Transfer Assistance: Independent  Active : No  Patient's  Info: sister  Mode of Transportation: Car  Occupation: On disability  Type of Occupation: nurse    OBJECTIVE:     PAIN: VITALS / O2: PRECAUTION / Nelma Kelch / DRAINS:   Pre Treatment:   Pain Assessment: 0-10  Pain Level: 6  Pain Location: Ankle  Pain Orientation: Left  Non-Pharmaceutical Pain Intervention(s): Repositioned      Post Treatment: 8/10 Vitals        Oxygen      Purewick    RESTRICTIONS/PRECAUTIONS:  Restrictions/Precautions: Fall Risk, Weight Bearing  Left Lower Extremity Weight Bearing: Weight Bearing As Tolerated (for transfer only with RW)              GROSS EVALUATION: Intact Impaired (Comments):   AROM []  L ankle limited by cast   PROM []    Strength []  LLE weakness following fx/sx   Balance [] Sitting - Static: Good (Simultaneous filing. User may not have seen previous data.)  Sitting - Dynamic: Good (Simultaneous filing. User may not have seen previous data.)  Standing - Static: Fair, - (Simultaneous filing. User may not have seen previous data.)  Standing - Dynamic: Fair, - (Simultaneous filing.  User may not have seen previous data.)   Posture [] Forward Head  Rounded Shoulders   Sensation []     Coordination []      Tone []     Edema []    Activity Tolerance []      []      COGNITION/  PERCEPTION: Intact Impaired (Comments):   Orientation [x]     Vision []     Hearing [x]     Cognition  [x]  Increased anxiety requiring cueing/reassurance during transfer     MOBILITY: I Mod I S SBA CGA Min Mod Max Total  NT x2 Comments:   Bed Mobility    Rolling [] [] [] [x] [] [] [] [] [] [] []    Supine to Sit [] [] [] [] [x] [] [] [] [] [] []    Scooting [] [] [] [] [x] [] [] [] [] [] []    Sit to Supine [] [] [] [] [x] [] [] [] [] [] []    Transfers    Sit to Stand [] [] [] [] [] [x] [] [] [] [] [x] Given cues for hand placement   Bed to Chair [] [] [] [] [] [x] [] [] [] [] [x]    Stand to Sit [] [] [] [] [] [x] [] [] [] [] [x]     [] [] [] [] [] [] [] [] [] [] []    I=Independent, Mod I=Modified Independent, S=Supervision, SBA=Standby Assistance, CGA=Contact Guard Assistance,   Min=Minimal Assistance, Mod=Moderate Assistance, Max=Maximal Assistance, Total=Total Assistance, NT=Not Tested    GAIT: I Mod I S SBA CGA Min Mod Max Total  NT x2 Comments:   Level of Assistance [] [] [] [] [] [] [] [] [] [x] []    Distance   feet    DME N/A    Gait Quality N/A    Weightbearing Status Restrictions/Precautions  Restrictions/Precautions: Fall Risk, Weight Bearing  Lower Extremity Weight Bearing Restrictions  Left Lower Extremity Weight Bearing: Weight Bearing As Tolerated (for transfer only)    Stairs      I=Independent, Mod I=Modified Independent, S=Supervision, SBA=Standby Assistance, CGA=Contact Guard Assistance,   Min=Minimal Assistance, Mod=Moderate Assistance, Max=Maximal Assistance, Total=Total Assistance, NT=Not Tested    PLAN:   FREQUENCY AND DURATION: BID for duration of hospital stay or until stated goals are met, whichever comes first.    THERAPY PROGNOSIS: Good    PROBLEM LIST:   (Skilled intervention is medically necessary to address:)  Decreased ADL/Functional Activities  Decreased Activity Tolerance  Decreased AROM/PROM  Decreased Balance  Decreased Coordination  Decreased Gait Ability  Decreased Safety Awareness  Decreased Strength  Decreased Transfer Abilities  Increased Pain INTERVENTIONS PLANNED:   (Benefits and precautions of physical therapy have been discussed with the patient.)  Self Care Training  Therapeutic Activity  Therapeutic Exercise/HEP  Neuromuscular Re-education  Gait Training  Education       TREATMENT:   EVALUATION: MODERATE COMPLEXITY: (Untimed Charge)    TREATMENT:   Co-Treatment PT/OT necessary due to patient's decreased overall endurance/tolerance levels, as well as need for high level skilled assistance to complete functional transfers/mobility and functional tasks  Therapeutic Activity (23 Minutes):  Therapeutic activity included Rolling, Supine to Sit, Sit to Supine, Scooting, and Transfer Training to improve functional Activity tolerance, Balance, Coordination, Mobility, Strength, and ROM.     TREATMENT GRID:  N/A    AFTER TREATMENT PRECAUTIONS: Alarm Activated, Bed/Chair Locked, Call light within reach, Chair, Needs within reach, and RN notified    INTERDISCIPLINARY COLLABORATION:  RN/ PCT, PT/ PTA, and OT/ PASCAL    EDUCATION: Education Given To: Patient  Education Provided: Role of Therapy;Plan of Care;Precautions;Transfer Training  Education Method: Verbal  Barriers to Learning: None  Education Outcome: Verbalized understanding    TIME IN/OUT:  Time In: 0958  Time Out: [address] [facility]  Minutes: [address], [address] [facility], [facility]

## 2022-10-04 NOTE — DISCHARGE INSTRUCTIONS
[facility]        Patient Discharge Instructions    Cynthia Hernandez / [id number] : 1955    Admitted 10/1/2022 Discharged: 10/4/2022     IF YOU HAVE ANY PROBLEMS ONCE YOU ARE AT HOME CALL THE FOLLOWING NUMBERS:   Main office number: [phone] ask for Swapna Owen (medical assistant with Dr. Aureliano Funes)  Office Address: [facility] [place] [address] [facility], [address] [facility]        Weight bearing status: As tolerated for transfers only    Activity  Weight bearing with walker as tolerated for transfers only   Keep cast completely dry    Diet  Resume regular diet      Medications    The medications you are to continue on are listed on the medication reconciliation sheet. Narcotic pain medications as well as supplemental iron can cause constipation. If this occurs try stopping the narcotic pain medication and/or the iron. It is important that you take the medication exactly as they are prescribed. Keep your medication in the bottles provided by the pharmacist and keep a list of the medication names, dosages, and times to be taken in your wallet. Do not take other medications without consulting your doctor. Other Important Information    Do NOT smoke as this will greatly increase your risk of infection! Do not drive and operate hazardous machinery until being cleared to do so by your doctor     Swelling and warmth is normal for 6 months after surgery. If you experience swelling in your leg elevate you leg while laying down with your toes above your heart. If you have sudden onset severe swelling with leg pain call our office. The stitches deep inside take approximately 6 months to dissolve. There will be sharp shooting, stinging and burning pain. This is normal and will resolve between 3-6 months after surgery. Difficulty sleeping is normal following surgery. You may try melatonin, an over-the-counter sleep aid or benadryl to help with sleep.  Most patients will resume sleeping through the night 5-8 weeks after surgery. You are at a risk for falls. Use the rolling walker when walking. Patients should not drive if they are still taking narcotic pain mediation during the daytime hours. Most patients wean themselves off of pain medication within 2-5 weeks after surgery. Please give a list of your current medications to your Primary Care Provider and update this list whenever your medications are discontinued, doses are changed, or new medications (including over-the-counter products) are added. Please carry medication information at all times in case of emergency situations. You may be retaining fluid if you have a history of heart failure or if you experience any of the following symptoms:  Weight gain of 3 pounds or more overnight or 5 pounds in a week, increased swelling in our hands or feet or shortness of breath while lying flat in bed. Please call your doctor as soon as you notice any of these symptoms; do not wait until your next office visit. If you have sleep apnea and have a CPAP machine, please use it for all naps and sleeping. When to Call the office    - If you have a temperature greater then 101  - Excessive bleeding that does not stop after holding pressure over the area  - Excessive redness, swelling or bruising, and/ or green or yellow, smelly discharge from incision  - Uncontrolled vomiting   - Loose control of your bladder or bowel function  - Need a pain medication refill   - Need to change your follow up appointment     Information obtained by :  I understand that if any problems occur once I am at home I am to contact my physician. I understand and acknowledge receipt of the instructions indicated above.                                                                                                                                            Physician's or R.N.'s Signature                                                                  Date/Time Patient or Representative Signature                                                          Date/Time

## 2022-10-04 NOTE — PROGRESS NOTES
[address]        2022         Post Op day: 1 Day Post-Op Procedure(s) (LRB):  LEFT ANKLE OPEN REDUCTION INTERNAL FIXATION (Left)      Admit Date: 10/1/2022  Admit Diagnosis: Closed bimalleolar fracture of left ankle, initial encounter [S82.842A]  Nondisp bimalleol fx l low leg, init for opn fx type I/2 [S82.845B]       Principle Problem: Nondisp bimalleol fx l low leg, init for opn fx type I/2. Subjective: Doing well, No complaints, No SOB, No Chest Pain, No Nausea or Vomiting     Objective:   Vital Signs are Stable, No Acute Distress, Alert,  Dressing is Dry,  Neurovascular exam is normal.     Assessment / Plan :  Patient Active Problem List   Diagnosis    Retinopathy due to secondary diabetes ([facility] [address]    Post-surgical hypothyroidism    Vaginal atrophy    Screening for breast cancer    History of Graves' disease    Chronic pain    Osteoporosis    Gastroparesis    Essential hypertension    GERD (gastroesophageal reflux disease)    Urethral caruncle    Hx of colon cancer, stage I    Benign paroxysmal positional vertigo    Type 2 diabetes with nephropathy (HCC)    Diabetic polyneuropathy associated with type 1 diabetes mellitus (HCC)    Personal history of malignant neoplasm of breast    Osteopenia    Type 1 diabetes mellitus (HCC)    HLD (hyperlipidemia)    Mixed incontinence    Chronic renal disease, stage III (HCC) [826034]    Nondisp bimalleol fx l low leg, init for opn fx type I/2    Patient Vitals for the past 8 hrs:   BP Temp Temp src Pulse Resp SpO2   10/04/22 0802 (!) 171/67 98.4 °F (36.9 °C) Axillary 95 17 96 %   10/04/22 0330 (!) 145/63 98.1 °F (36.7 °C) Oral 75 16 94 %    Temp (24hrs), Av.1 °F (36.7 °C), Min:97 °F (36.1 °C), Max:99 °F (37.2 °C)    Body mass index is 24.78 kg/m².     Lab Results   Component Value Date/Time    HGB 11.6 10/04/2022 05:27 AM          S/P Procedure(s) (LRB):  LEFT ANKLE OPEN REDUCTION INTERNAL FIXATION (Left)      Medical Mgmt per hospitalist  Continue PT: WBAT for transfers only with walker   Fall Precautions  DC disp: home  F/U: 2 weeks postop for wound check and staple removal        Signed By: KEHINDE Khan, PA  10/4/2022,  9:19 AM

## 2022-10-04 NOTE — PROGRESS NOTES
ACUTE PHYSICAL THERAPY GOALS:   (Developed with and agreed upon by patient and/or caregiver.)  (1.) Ernesto Alvarado  will move from supine to sit and sit to supine , scoot up and down, and roll side to side with MODIFIED INDEPENDENCE within 7 treatment day(s). (2.) Ernesto Alvarado will transfer from bed to chair and chair to bed with SUPERVISION using the least restrictive device within 7 treatment day(s). (3.) Ernesto Alvarado will perform w/c mobility in room/hallway for 100 ft at SUPERVISION within 7 treatment days  (4.) Ernesto Alvarado will perform bilateral lower extremity exercises x 15 min for HEP with MODIFIED INDEPENDENCE to improve strength, endurance, and functional mobility within 7 treatment day(s). PHYSICAL THERAPY: Daily Note PM   (Link to Caseload Tracking: PT Visit Days : 1  Time In/Out PT Charge Capture  Rehab Caseload Tracker  Orders    Ernesto Alvarado is a 75 y.o. female   PRIMARY DIAGNOSIS: Nondisp bimalleol fx l low leg, init for opn fx type I/2  Closed bimalleolar fracture of left ankle, initial encounter [S82.842A]  Nondisp bimalleol fx l low leg, init for opn fx type I/2 [S82.845B]  Procedure(s) (LRB):  LEFT ANKLE OPEN REDUCTION INTERNAL FIXATION (Left)  1 Day Post-Op  Inpatient: Payor: Oli Sanchez / Plan: Donaldo Ghosh PPO / Product Type: Medicare /     ASSESSMENT:     REHAB RECOMMENDATIONS:   Recommendation to date pending progress:  Setting:  Inpatient Rehab Facility    Equipment:    To Be Determined     ASSESSMENT:  Ms. Fozia Pickens is sitting up in chair upon arrival and pleasantly agrees to participate. She continues to perform bed mobility and transfers using 2WW with minAx2. Demonstrates decreased anxiety with task this afternoon verses first session this morning. Will continue to benefit from skilled interventions.       SUBJECTIVE:   Ms. Fozia Pickens states, \"I am ready to get back to bed, but it felt good to be up for a while\"     Social/Functional Lives With: Alone  Type of Home: House  Home Layout: One level  Home Access: Stairs to enter with rails  Entrance Stairs - Number of Steps: 1  Entrance Stairs - Rails: None  Home Equipment: [place]all[place] Sames, rolling  Has the patient had two or more falls in the past year or any fall with injury in the past year?: Yes  Receives Help From: Family  ADL Assistance: Independent  Homemaking Assistance: Independent  Ambulation Assistance: Independent  Transfer Assistance: Independent  Active : No  Patient's  Info: sister  Mode of Transportation: Car  Occupation: On disability  Type of Occupation: nurse  OBJECTIVE:     PAIN: [place] / O2: PRECAUTION / Christine Giulia / Redia Alexandra:   Pre Treatment:   Pain Assessment: 0-10  Pain Level: 6  Pain Location: Ankle  Pain Orientation: Left  Non-Pharmaceutical Pain Intervention(s): Repositioned      Post Treatment: 7/10 Vitals        Oxygen    Purewick    RESTRICTIONS/PRECAUTIONS:  Restrictions/Precautions  Restrictions/Precautions: Fall Risk, Weight Bearing  Lower Extremity Weight Bearing Restrictions  Left Lower Extremity Weight Bearing: Weight Bearing As Tolerated (for transfer only)  Restrictions/Precautions: Fall Risk, Weight Bearing     MOBILITY: I Mod I S SBA CGA Min Mod Max Total  NT x2 Comments:   Bed Mobility    Rolling [] [] [] [x] [] [] [] [] [] [] []    Supine to Sit [] [] [] [] [] [] [] [] [] [] []    Scooting [] [] [] [x] [] [] [] [] [] [] []    Sit to Supine [] [] [] [x] [] [] [] [] [] [] []    Transfers    Sit to Stand [] [] [] [] [] [x] [] [] [] [] [x] Given cues for hand placement    Bed to Chair [] [] [] [] [] [x] [] [] [] [] [x]    Stand to Sit [] [] [] [] [] [x] [] [] [] [] [x]     [] [] [] [] [] [] [] [] [] [] []    I=Independent, Mod I=Modified Independent, S=Supervision, SBA=Standby Assistance, CGA=Contact Guard Assistance,   Min=Minimal Assistance, Mod=Moderate Assistance, Max=Maximal Assistance, Total=Total Assistance, NT=Not Tested    BALANCE: Good Fair+ Fair Fair- Poor NT Comments   Sitting Static [x] [] [] [] [] []    Sitting Dynamic [x] [] [] [] [] []              Standing Static [] [] [] [x] [] []    Standing Dynamic [] [] [] [x] [] []      GAIT: I Mod I S SBA CGA Min Mod Max Total  NT x2 Comments:   Level of Assistance [] [] [] [] [] [] [] [] [] [] []    Distance   feet    DME N/A    Gait Quality N/A    Weightbearing Status Left Lower Extremity Weight Bearing: Weight Bearing As Tolerated (for transfer only)    Stairs      I=Independent, Mod I=Modified Independent, S=Supervision, SBA=Standby Assistance, CGA=Contact Guard Assistance,   Min=Minimal Assistance, Mod=Moderate Assistance, Max=Maximal Assistance, Total=Total Assistance, NT=Not Tested    PLAN:   FREQUENCY AND DURATION: BID for duration of hospital stay or until stated goals are met, whichever comes first.    TREATMENT:   TREATMENT:   Therapeutic Activity (15 Minutes): Therapeutic activity included Sit to Supine, Scooting, and Transfer Training to improve functional Activity tolerance, Balance, Coordination, Mobility, Strength, and ROM.     TREATMENT GRID:  N/A    AFTER TREATMENT PRECAUTIONS: Bed, Bed/Chair Locked, Call light within reach, Needs within reach, RN notified, and Visitors at bedside    INTERDISCIPLINARY COLLABORATION:  RN/ PCT and PT/ PTA    EDUCATION: Education Given To: Patient  Education Provided: Role of Therapy;Plan of Care;Precautions;Transfer Training  Education Method: Verbal  Barriers to Learning: None  Education Outcome: Verbalized understanding    TIME IN/OUT:  Time In: 1304  Time Out: [address]  Minutes: [address] [facility], [address] [facility]

## 2022-10-04 NOTE — PROGRESS NOTES
ACUTE OCCUPATIONAL THERAPY GOALS:   (Developed with and agreed upon by patient and/or caregiver.)  1. Patient will complete lower body bathing and dressing with SUPERVISION and adaptive equipment as needed. 2. Patient will complete toileting with SUPERVISION. 3. Patient will tolerate 25 minutes of OT treatment with 1-2 rest breaks to increase activity tolerance for ADLs. 4. Patient will complete functional transfers with SUPERVISION while maintaining Wbing restrictions and adaptive equipment as needed. Timeframe: 7 visits       OCCUPATIONAL THERAPY Initial Assessment and Daily Note       OT Visit Days: 1  Acknowledge Orders  Time  OT Charge Capture  Rehab Caseload Tracker      Maria Dolores Osorio is a 75 y.o. female   PRIMARY DIAGNOSIS: Nondisp bimalleol fx l low leg, init for opn fx type I/2  Closed bimalleolar fracture of left ankle, initial encounter [S82.842A]  Nondisp bimalleol fx l low leg, init for opn fx type I/2 [S82.845B]  Procedure(s) (LRB):  LEFT ANKLE OPEN REDUCTION INTERNAL FIXATION (Left)  1 Day Post-Op  Reason for Referral: Generalized Muscle Weakness (M62.81)  Difficulty in walking, Not elsewhere classified (R26.2)  History of falling (Z91.81)  Inpatient: Payor: Ruby Iglesias / Plan: [facility] PPO / Product Type: Medicare /     ASSESSMENT:     REHAB RECOMMENDATIONS:   Recommendation to date pending progress:  Setting:  Inpatient Rehab Facility    Equipment:    To Be Determined     ASSESSMENT:  Ms. Fidelia Miller is a 76 y/o female who presents with L ankle fracture following a fall in her home. At baseline pt is very active and independent and lives in her home alone. This date, pt POD#1 with L ankle ORIF and WBAT for transfers only with walker. Min-mod Ax2 for sit<>stand transfers and minAx2 for transfer with RW to chair. Engaged in grooming tasks seated in recliner with set up. Today pt presents with decreased activity tolerance and strength impacting ADLs.  Pt is currently functioning below baseline and would benefit from skilled OT services to address OT goals and plan of care. She is very motivated to work with therapy and wants to get back to being active and taking care of her home and yard. Therapist educated pt on d/c rec to [facility] and tolerance towards 3 hours of therapy/ day which she states she is ready for.       [address] [facility] [address] [address] AM-PAC 6 Clicks Daily Activity Inpatient Short Form:    AM-PAC Daily Activity Inpatient   How much help for putting on and taking off regular lower body clothing?: A Little  How much help for Bathing?: A Little  How much help for Toileting?: A Little  How much help for putting on and taking off regular upper body clothing?: None  How much help for taking care of personal grooming?: None  How much help for eating meals?: None  AM-PAC Inpatient Daily Activity Raw Score: 21  AM-PAC Inpatient ADL T-Scale Score : 44.27  ADL Inpatient CMS 0-100% Score: 32.79  ADL Inpatient CMS G-Code Modifier : CJ           SUBJECTIVE:     Ms. Zully De La Cruz states, \"I want to get better\"     Social/Functional Lives With: Alone  Type of Home: House  Home Layout: One level  Home Access: Stairs to enter with rails  Entrance Stairs - Number of Steps: 1  Entrance Stairs - Rails: None  Home Equipment: Walker, rolling  Has the patient had two or more falls in the past year or any fall with injury in the past year?: Yes  Receives Help From: Family  ADL Assistance: Independent  Homemaking Assistance: Independent  Ambulation Assistance: Independent  Transfer Assistance: Independent  Active : No  Patient's  Info: sister  Mode of Transportation: Car  Occupation: On disability  Type of Occupation: nurse    OBJECTIVE:     Araceli Pike / Kerry Burdick / Arti Johnson: None    RESTRICTIONS/PRECAUTIONS:  Restrictions/Precautions: Fall Risk, Weight Bearing  Left Lower Extremity Weight Bearing: Weight Bearing As Tolerated (for transfer only with RW)    PAIN: VITALS / O2:   Pre Treatment:    6/10      Post Treatment: 6/10, nurse notified on pain level       Vitals          Oxygen            GROSS EVALUATION: INTACT IMPAIRED   (See Comments)   UE AROM [x] []   UE PROM [x] []   Strength []    Generally deconditioned but functional   Posture / Balance []     Sensation [x]     Coordination [x]       Tone [x]       Edema [x]    Activity Tolerance []    Limited by pain   Hand Dominance R [] L []      COGNITION/  PERCEPTION: INTACT IMPAIRED   (See Comments)   Orientation [x]     Vision [x]     Hearing [x]     Cognition  [x]     Perception [x]       MOBILITY: I Mod I S SBA CGA Min Mod Max Total  NT x2 Comments:   Bed Mobility    Rolling [] [] [] [] [] [] [] [] [] [] []    Supine to Sit [] [] [] [x] [] [] [] [] [] [] []    Scooting [] [] [] [] [] [] [] [] [] [] []    Sit to Supine [] [] [] [] [] [] [] [] [] [] []    Transfers    Sit to Stand [] [] [] [] [] [x] [x] [] [] [] [x]    Bed to Chair [] [] [] [] [] [x] [] [] [] [] [x]    Stand to Sit [] [] [] [] [] [x] [] [] [] [] [x]    Tub/Shower [] [] [] [] [] [] [] [] [] [] []     Toilet [] [] [] [] [] [] [] [] [] [] []      [] [] [] [] [] [] [] [] [] [] []    I=Independent, Mod I=Modified Independent, S=Supervision/Setup, SBA=Standby Assistance, CGA=Contact Guard Assistance, Min=Minimal Assistance, Mod=Moderate Assistance, Max=Maximal Assistance, Total=Total Assistance, NT=Not Tested    ACTIVITIES OF DAILY LIVING: I Mod I S SBA CGA Min Mod Max Total NT Comments   BASIC ADLs:              Upper Body Bathing  [] [] [] [] [] [] [] [] [] []    Lower Body Bathing [] [] [] [] [] [] [] [] [] []    Toileting [] [] [] [] [] [] [] [] [] []    Upper Body Dressing [] [] [] [] [] [] [] [] [] []    Lower Body Dressing [] [] [] [] [] [] [] [] [] []    Feeding [] [] [] [] [] [] [] [] [] []    Grooming [] [] [] [x] [] [] [] [] [] []    Personal Device Care [] [] [] [] [] [] [] [] [] []    Functional Mobility [] [] [] [x] [] [] [] [] [] [] With RW, transfer to chair   I=Independent, Mod I=Modified Independent, S=Supervision/Setup, SBA=Standby Assistance, CGA=Contact Guard Assistance, Min=Minimal Assistance, Mod=Moderate Assistance, Max=Maximal Assistance, Total=Total Assistance, NT=Not Tested    PLAN:   [facility] Care: 3 times/week for duration of hospital stay or until stated goals are met, whichever comes first.    PROBLEM LIST:   (Skilled intervention is medically necessary to address:)  Decreased ADL/Functional Activities  Decreased Activity Tolerance  Decreased Balance  Decreased Gait Ability  Decreased Strength  Decreased Transfer Abilities  Increased Pain   INTERVENTIONS PLANNED:  (Benefits and precautions of occupational therapy have been discussed with the patient.)  Self Care Training  Therapeutic Activity  Therapeutic Exercise/HEP  Neuromuscular Re-education  Manual Therapy  Education         TREATMENT:     EVALUATION: MODERATE COMPLEXITY: (Untimed Charge)    TREATMENT:   Co-Treatment PT/OT necessary due to patient's decreased overall endurance/tolerance levels, as well as need for high level skilled assistance to complete functional transfers/mobility and functional tasks  Neuromuscular Re-education (10 Minutes): Neuromuscular Re-education included Balance Training, Functional mobility with facilitation, Sitting balance training, and Standing balance training to improve Balance, Coordination, and Functional Mobility. Self Care (15 minutes): Patient participated in grooming ADLs in supported sitting with minimal verbal cueing to increase independence, decrease assistance required, and increase activity tolerance. Patient also participated in functional mobility and functional transfer training to increase independence, decrease assistance required, and increase activity tolerance.      TREATMENT GRID:  N/A    AFTER TREATMENT PRECAUTIONS: Alarm Activated, Bed/Chair Locked, Call light within reach, Chair, Heels floated, and Needs within reach    INTERDISCIPLINARY COLLABORATION:  RN/ PCT and PT/ PTA    EDUCATION:  Education Given To: Patient  Education Provided: Role of Therapy;Plan of Care;Precautions;Transfer Training  Education Method: Demonstration;Verbal  Barriers to Learning: None  Education Outcome: Verbalized understanding;Demonstrated understanding;Continued education needed    TOTAL TREATMENT DURATION AND TIME:  Time In: 0958  Time Out: [address] [facility]  Minutes: [address]

## 2022-10-04 NOTE — CARE COORDINATION
CM met with pt and friend to discuss rehab placement. Pt is interested in [facility]. CM has requested that physiatrist do a preliminary review of the pt's chart to determine if pt's insurance would approve acute rehab. CM provided the pt with a list of facilities in network with her insurance to review. 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 Chest xray will be required. CM following to facilitate pt's transfer to rehab at [place].    1532:  Physiatrist reviewed the chart and does not believe pt's insurance would approve acute inpatient rehab. DENY met again with pt/friend and provided update. Pt requested a referral to [facility].  Pt requesting a private room. Referral submitted. Awaiting a response.

## 2022-10-04 NOTE — PROGRESS NOTES
Hospitalist Progress Note   Admit Date:  10/1/2022  1:42 PM   Name:  Cynthia Hernandez   Age:  75 y.o. Sex:  female  :  1955   MRN:  [id number]   Room:  [facility]    Presenting Complaint: Ankle Pain     Reason(s) for Admission: Closed bimalleolar fracture of left ankle, initial encounter [H07.081W]  Nondisp bimalleol fx l low leg, init for opn fx type [address] Course:   Ms. Maribell Pena is a 78 y.o. CF with a PMH of HTN, DM Type 1 since 48 + years, GERD, DM Polyneuropathy, CKD stage 3, Glaucoma, Hypothyroidism, Dry eyes and gait imbalance who was admitted on 10/1 with LLE bimalleolar fx. Also concerns of ongoing shuffle gait, seen by neurology with recommendations for daily low-dose ASA and extensive PT/OT. She was taken for surgery on Oct/03. Subjective & 24hr Events (10/04/22): No AEO. Today is the patient's birthday. She is in no distress during our encounter. VS, labs ok again this morning. PT/OT today; the patient says she is now open to going to rehab. Assessment & Plan:     Principal Problem:  Displaced bimalleolar L ankle fracture  POD 1, Open treatment of left bimalleolar ankle fracture with internal fixation  Circulatory checks per unit standards  **per neuro recs, ASA 81 mg to be initiated post surgery for stroke prevention  PRN analgesia  PT/OT  Follow up with Ortho in 2 weeks      Active Problems:  Chronic renal disease, stage III ([address] [191041]  sCr stable  Continue to trend     Post-surgical hypothyroidism  Synthroid home dose resumed     Gastroparesis  Aware     Essential hypertension  Continue ARB     Type 1 diabetes mellitus (HCC)  Lantus 30 units nightly  Continue medium correction scale; this matches her home scale  Use her DexCom for CBG checks     HLD (hyperlipidemia)  Start atorvastatin 40 mg daily     Polyneuropathy  PT/OT eval  PPD ordered      Anticipated discharge needs: Pending      Diet:  ADULT DIET;  Regular  DVT PPx: per Ortho  Code status: Full Code    Hospital Problems:  Principal Problem:    Nondisp bimalleol fx l low leg, init for opn fx type I/2  Active Problems:    Chronic renal disease, stage III ([facility] [address] [664973]    Post-surgical hypothyroidism    Gastroparesis    Essential hypertension    Type 1 diabetes mellitus (HCC)    HLD (hyperlipidemia)  Resolved Problems:    * No resolved hospital problems. *      Objective:   Patient Vitals for the past 24 hrs:   Temp Pulse Resp BP SpO2   10/04/22 0802 98.4 °F (36.9 °C) 95 17 (!) 171/67 96 %   10/04/22 0330 98.1 °F (36.7 °C) 75 16 (!) 145/63 94 %   10/04/22 0001 98.4 °F (36.9 °C) 75 18 (!) 153/65 95 %   10/03/22 1952 97.5 °F (36.4 °C) 88 18 (!) 166/81 98 %   10/03/22 1845 99 °F (37.2 °C) 93 20 (!) 166/71 96 %   10/03/22 1832 -- 77 -- (!) 144/70 95 %   10/03/22 1827 -- 81 -- (!) 158/61 94 %   10/03/22 1757 -- 79 -- (!) 156/72 100 %   10/03/22 1737 -- 80 18 (!) 156/72 100 %   10/03/22 1732 97.6 °F (36.4 °C) 80 18 (!) 162/77 100 %   10/03/22 1727 -- 85 16 (!) 166/72 100 %   10/03/22 1722 -- 86 16 (!) 170/79 98 %   10/03/22 1717 -- 80 18 (!) 178/81 100 %   10/03/22 1712 -- 89 18 (!) 173/79 100 %   10/03/22 1707 -- 89 16 (!) 173/73 100 %   10/03/22 1702 97 °F (36.1 °C) 95 16 (!) 178/82 100 %   10/03/22 1540 -- 88 16 -- 96 %   10/03/22 1452 98.3 °F (36.8 °C) 86 20 (!) 194/79 96 %   10/03/22 1101 98.3 °F (36.8 °C) 81 20 (!) 151/75 98 %         Oxygen Therapy  SpO2: 96 %  Pulse Oximeter Device Mode: Continuous  O2 Device: None (Room air)  O2 Flow Rate (L/min): 2 L/min    Estimated body mass index is 24.78 kg/m² as calculated from the following:    Height as of this encounter: 5' 8\" (1.727 m). Weight as of this encounter: 163 lb (73.9 kg).     Intake/Output Summary (Last 24 hours) at 10/4/2022 1002  Last data filed at 10/3/2022 2135  Gross per 24 hour   Intake 700 ml   Output 870 ml   Net -170 ml           Physical Exam:   Blood pressure (!) 171/67, pulse 95, temperature 98.4 °F (36.9 °C), temperature source Axillary, resp. rate 17, height 5' 8\" (1.727 m), weight 163 lb (73.9 kg), SpO2 96 %. General:    No acute distress  Head:  Normocephalic, atraumatic  Eyes:  Sclerae appear normal.  Pupils equally round. ENT:  Nares appear normal, no drainage. Moist oral mucosa  Neck:  No restricted ROM. Trachea midline   CV:   RRR. murmur  Lungs: No wheezing. No tachypnea. Abdomen:   Soft, nondistended. Extremities: No cyanosis or clubbing. Skin:     No rashes and normal coloration. Warm and dry. Neuro:  CN II-XII grossly intact. A&Ox3  Psych:  Normal mood and affect.       I have personally reviewed labs and tests showing:  Recent Labs:  Recent Results (from the past 48 hour(s))   POCT Glucose    Collection Time: 10/02/22  3:58 PM   Result Value Ref Range    POC Glucose 194 (H) 65 - 100 mg/dL    Performed by: CarolannRIAN    CBC with Auto Differential    Collection Time: 10/03/22  4:56 AM   Result Value Ref Range    WBC 7.3 4.3 - 11.1 K/uL    RBC 3.46 (L) 4.05 - 5.2 M/uL    Hemoglobin 11.6 (L) 11.7 - 15.4 g/dL    Hematocrit 34.8 (L) 35.8 - 46.3 %    .6 (H) 79.6 - 97.8 FL    MCH 33.5 (H) 26.1 - 32.9 PG    MCHC 33.3 31.4 - 35.0 g/dL    RDW 13.0 11.9 - 14.6 %    Platelets 370 846 - 243 K/uL    MPV 9.5 9.4 - 12.3 FL    nRBC 0.00 0.0 - 0.2 K/uL    Differential Type AUTOMATED      Seg Neutrophils 75 43 - 78 %    Lymphocytes 12 (L) 13 - 44 %    Monocytes 11 4.0 - 12.0 %    Eosinophils % 2 0.5 - 7.8 %    Basophils 0 0.0 - 2.0 %    Immature Granulocytes 0 0.0 - 5.0 %    Segs Absolute 5.5 1.7 - 8.2 K/UL    Absolute Lymph # 0.9 0.5 - 4.6 K/UL    Absolute Mono # 0.8 0.1 - 1.3 K/UL    Absolute Eos # 0.2 0.0 - 0.8 K/UL    Basophils Absolute 0.0 0.0 - 0.2 K/UL    Absolute Immature Granulocyte 0.0 0.0 - 0.5 K/UL   Basic Metabolic Panel w/ Reflex to MG    Collection Time: 10/03/22  4:56 AM   Result Value Ref Range    Sodium 134 (L) 136 - 145 mmol/L    Potassium 3.9 3.5 - 5.1 mmol/L    Chloride 100 (L) 101 - 110 mmol/L CO2 28 21 - 32 mmol/L    Anion Gap 6 4 - 13 mmol/L    Glucose 111 (H) 65 - 100 mg/dL    BUN 15 8 - 23 MG/DL    Creatinine 0.60 0.6 - 1.0 MG/DL    GFR African American >60 >60 ml/min/1.73m2    GFR Non- >60 >60 ml/min/1.73m2    Calcium 9.1 8.3 - 10.4 MG/DL   POCT Glucose    Collection Time: 10/03/22  8:29 AM   Result Value Ref Range    POC Glucose 114 (H) 65 - 100 mg/dL    Performed by: Keyonna Ashford    TYPE AND SCREEN    Collection Time: 10/03/22 10:37 AM   Result Value Ref Range    Crossmatch expiration date 10/06/2022,2359     ABO/Rh A POSITIVE     Antibody Screen NEG    POCT Glucose    Collection Time: 10/03/22 11:04 AM   Result Value Ref Range    POC Glucose 108 (H) 65 - 100 mg/dL    Performed by: Dennie Ar    POCT Glucose    Collection Time: 10/03/22 11:33 AM   Result Value Ref Range    POC Glucose 111 (H) 65 - 100 mg/dL    Performed by: Keyonna Ashford    POCT Glucose    Collection Time: 10/03/22  8:11 PM   Result Value Ref Range    POC Glucose 106 (H) 65 - 100 mg/dL    Performed by: Agustina(AS)    Basic Metabolic Panel w/ Reflex to MG    Collection Time: 10/04/22  5:27 AM   Result Value Ref Range    Sodium 137 136 - 145 mmol/L    Potassium 4.0 3.5 - 5.1 mmol/L    Chloride 103 101 - 110 mmol/L    CO2 28 21 - 32 mmol/L    Anion Gap 6 4 - 13 mmol/L    Glucose 112 (H) 65 - 100 mg/dL    BUN 10 8 - 23 MG/DL    Creatinine 0.60 0.6 - 1.0 MG/DL    Est, Glom Filt Rate >60 >60 ml/min/1.73m2    Calcium 9.0 8.3 - 10.4 MG/DL   CBC    Collection Time: 10/04/22  5:27 AM   Result Value Ref Range    WBC 7.0 4.3 - 11.1 K/uL    RBC 3.51 (L) 4.05 - 5.2 M/uL    Hemoglobin 11.6 (L) 11.7 - 15.4 g/dL    Hematocrit 35.2 (L) 35.8 - 46.3 %    .3 (H) 79.6 - 97.8 FL    MCH 33.0 (H) 26.1 - 32.9 PG    MCHC 33.0 31.4 - 35.0 g/dL    RDW 12.8 11.9 - 14.6 %    Platelets 014 983 - 000 K/uL    MPV 9.7 9.4 - 12.3 FL    nRBC 0.00 0.0 - 0.2 K/uL   POCT Glucose    Collection Time: 10/04/22  7:09 AM   Result Value Ref Range    POC Glucose 89 65 - 100 mg/dL    Performed by: Thalia)        I have personally reviewed imaging studies showing: Other Studies:  XR ANKLE LEFT (MIN 3 VIEWS)   Final Result   Postoperative findings present in the ankle. Midfoot degenerative   joint disease. NC XR TECHNOLOGIST SERVICE   Final Result      XR CHEST PORTABLE   Final Result   No evidence of an acute intrathoracic process. Mild left basilar atelectasis. CT HEAD WO CONTRAST   Final Result   Chronic appearing changes without acute intracranial abnormality. XR ANKLE LEFT (MIN 3 VIEWS)   Final Result   Fractures of the medial and lateral malleoli.           Current Meds:  Current Facility-Administered Medications   Medication Dose Route Frequency    enoxaparin (LOVENOX) injection 40 mg  40 mg SubCUTAneous Daily    atorvastatin (LIPITOR) tablet 40 mg  40 mg Oral Nightly    morphine injection 4 mg  4 mg IntraVENous Q1H PRN    oyster shell calcium w/D 500-200 MG-UNIT tablet 1 tablet  1 tablet Oral BID    carboxymethylcellulose (THERATEARS) 1 % ophthalmic gel 1 drop  1 drop Ophthalmic Daily PRN    insulin lispro (HUMALOG) injection vial 2 Units  2 Units SubCUTAneous 4x Daily AC & HS    insulin glargine (LANTUS) injection vial 30 Units  30 Units SubCUTAneous Nightly    levothyroxine (SYNTHROID) tablet 112 mcg  112 mcg Oral QAM AC    cetirizine (ZYRTEC) tablet 10 mg  10 mg Oral Daily PRN    losartan (COZAAR) tablet 100 mg  100 mg Oral Daily    miconazole (MICOTIN) 2 % powder   Topical BID    ondansetron (ZOFRAN-ODT) disintegrating tablet 8 mg  8 mg Oral Q8H PRN    pantoprazole (PROTONIX) tablet 40 mg  40 mg Oral Daily    ketotifen (ZADITOR) 0.025 % ophthalmic solution 1 drop  1 drop Both Eyes BID    pilocarpine (PILOCAR) 1 % ophthalmic solution 1 drop  1 drop Left Eye BID    sennosides-docusate sodium (SENOKOT-S) 8.6-50 MG tablet 1 tablet  1 tablet Oral Daily PRN    latanoprost (XALATAN) 0.005 % ophthalmic solution 1 drop  1 drop Both Eyes Nightly    sodium chloride flush 0.9 % injection 5-40 mL  5-40 mL IntraVENous 2 times per day    sodium chloride flush 0.9 % injection 5-40 mL  5-40 mL IntraVENous PRN    0.9 % sodium chloride infusion   IntraVENous PRN    polyethylene glycol (GLYCOLAX) packet 17 g  17 g Oral Daily PRN    acetaminophen (TYLENOL) tablet 650 mg  650 mg Oral Q6H PRN    Or    acetaminophen (TYLENOL) suppository 650 mg  650 mg Rectal Q6H PRN    glucose chewable tablet 16 g  4 tablet Oral PRN    dextrose bolus 10% 125 mL  125 mL IntraVENous PRN    Or    dextrose bolus 10% 250 mL  250 mL IntraVENous PRN    glucagon (rDNA) injection 1 mg  1 mg SubCUTAneous PRN    dextrose 10 % infusion   IntraVENous Continuous PRN    HYDROcodone-acetaminophen (NORCO) 7.5-325 MG per tablet 1 tablet  1 tablet Oral Q6H PRN    insulin lispro (HUMALOG) injection vial 0-8 Units  0-8 Units SubCUTAneous TID WC    insulin lispro (HUMALOG) injection vial 0-4 Units  0-4 Units SubCUTAneous Nightly       Signed:  LON Ceballos - CNP    Part of this note may have been written by using a voice dictation software. The note has been proof read but may still contain some grammatical/other typographical errors.

## 2022-10-05 PROCEDURE — 97530 THERAPEUTIC ACTIVITIES: CPT

## 2022-10-05 PROCEDURE — 2580000003 HC RX 258: Performed by: ORTHOPAEDIC SURGERY

## 2022-10-05 PROCEDURE — 97110 THERAPEUTIC EXERCISES: CPT

## 2022-10-05 PROCEDURE — 6370000000 HC RX 637 (ALT 250 FOR IP): Performed by: NURSE PRACTITIONER

## 2022-10-05 PROCEDURE — 97535 SELF CARE MNGMENT TRAINING: CPT

## 2022-10-05 PROCEDURE — 6370000000 HC RX 637 (ALT 250 FOR IP): Performed by: ORTHOPAEDIC SURGERY

## 2022-10-05 PROCEDURE — 1100000003 HC PRIVATE W/ TELEMETRY

## 2022-10-05 PROCEDURE — 6360000002 HC RX W HCPCS: Performed by: NURSE PRACTITIONER

## 2022-10-05 PROCEDURE — 97112 NEUROMUSCULAR REEDUCATION: CPT

## 2022-10-05 RX ORDER — CETIRIZINE HYDROCHLORIDE 10 MG/1
10 TABLET ORAL DAILY
Status: DISCONTINUED | OUTPATIENT
Start: 2022-10-05 | End: 2022-10-06 | Stop reason: HOSPADM

## 2022-10-05 RX ORDER — GUAIFENESIN 600 MG/1
600 TABLET, EXTENDED RELEASE ORAL 2 TIMES DAILY
Status: DISCONTINUED | OUTPATIENT
Start: 2022-10-05 | End: 2022-10-06 | Stop reason: HOSPADM

## 2022-10-05 RX ADMIN — INSULIN LISPRO 2 UNITS: 100 INJECTION, SOLUTION INTRAVENOUS; SUBCUTANEOUS at 16:10

## 2022-10-05 RX ADMIN — SODIUM CHLORIDE, PRESERVATIVE FREE 10 ML: 5 INJECTION INTRAVENOUS at 09:14

## 2022-10-05 RX ADMIN — INSULIN GLARGINE 30 UNITS: 100 INJECTION, SOLUTION SUBCUTANEOUS at 20:12

## 2022-10-05 RX ADMIN — GUAIFENESIN 600 MG: 600 TABLET ORAL at 11:12

## 2022-10-05 RX ADMIN — ENOXAPARIN SODIUM 40 MG: 100 INJECTION SUBCUTANEOUS at 09:16

## 2022-10-05 RX ADMIN — Medication 1 TABLET: at 09:12

## 2022-10-05 RX ADMIN — CETIRIZINE HYDROCHLORIDE 10 MG: 10 TABLET ORAL at 11:12

## 2022-10-05 RX ADMIN — LEVOTHYROXINE SODIUM 112 MCG: 0.11 TABLET ORAL at 05:20

## 2022-10-05 RX ADMIN — PANTOPRAZOLE SODIUM 40 MG: 40 TABLET, DELAYED RELEASE ORAL at 09:12

## 2022-10-05 RX ADMIN — LOSARTAN POTASSIUM 100 MG: 50 TABLET, FILM COATED ORAL at 09:12

## 2022-10-05 RX ADMIN — INSULIN LISPRO 2 UNITS: 100 INJECTION, SOLUTION INTRAVENOUS; SUBCUTANEOUS at 07:59

## 2022-10-05 RX ADMIN — Medication 1 TABLET: at 20:13

## 2022-10-05 RX ADMIN — INSULIN LISPRO 2 UNITS: 100 INJECTION, SOLUTION INTRAVENOUS; SUBCUTANEOUS at 11:12

## 2022-10-05 RX ADMIN — GUAIFENESIN 600 MG: 600 TABLET ORAL at 20:13

## 2022-10-05 RX ADMIN — SODIUM CHLORIDE, PRESERVATIVE FREE 10 ML: 5 INJECTION INTRAVENOUS at 20:17

## 2022-10-05 RX ADMIN — SENNOSIDES AND DOCUSATE SODIUM 1 TABLET: 8.6; 5 TABLET ORAL at 12:33

## 2022-10-05 ASSESSMENT — PAIN SCALES - GENERAL
PAINLEVEL_OUTOF10: 0
PAINLEVEL_OUTOF10: 0

## 2022-10-05 NOTE — PROGRESS NOTES
ACUTE PHYSICAL THERAPY GOALS:   (Developed with and agreed upon by patient and/or caregiver.)  (1.) Glenna Pipradha  will move from supine to sit and sit to supine , scoot up and down, and roll side to side with MODIFIED INDEPENDENCE within 7 treatment day(s). (2.) Glenna Barney will transfer from bed to chair and chair to bed with SUPERVISION using the least restrictive device within 7 treatment day(s). (3.) Glenna Barney will perform w/c mobility in room/hallway for 100 ft at SUPERVISION within 7 treatment days  (4.) Glenna Barney will perform bilateral lower extremity exercises x 15 min for HEP with MODIFIED INDEPENDENCE to improve strength, endurance, and functional mobility within 7 treatment day(s). PHYSICAL THERAPY: Daily Note AM   (Link to Caseload Tracking: PT Visit Days : 1  Time In/Out PT Charge Capture  Rehab Caseload Tracker  Orders    Glenna Barney is a 75 y.o. female   PRIMARY DIAGNOSIS: Nondisp bimalleol fx l low leg, init for opn fx type I/2  Closed bimalleolar fracture of left ankle, initial encounter [S82.842A]  Nondisp bimalleol fx l low leg, init for opn fx type I/2 [S82.845B]  Procedure(s) (LRB):  LEFT ANKLE OPEN REDUCTION INTERNAL FIXATION (Left)  2 Days Post-Op  Inpatient: Payor: London Elizondo / Plan: Flori Raymundo PPO / Product Type: Medicare /     ASSESSMENT:     REHAB RECOMMENDATIONS:   Recommendation to date pending progress:  Setting:  Inpatient Rehab Facility    Equipment:    To Be Determined     ASSESSMENT:  Ms. Avery Benitez is making slow progress toward goals. She is able to perform STS and SPT with the RW and CGA. She does continue to need cuing for hand placement and pacing during transfers. Attempted gait with NWB/hopping technique, but patient was unable to maintain NWB, so gait was aborted to prevent breaking precautions. She participated in seated exercises and did well demonstrating which exercises to perform.       SUBJECTIVE:   Ms. Avery Benitez states, \"I sat up three hours yesterday\"     Social/Functional Lives With: Alone  Type of Home: House  Home Layout: One level  Home Access: Stairs to enter with rails  Entrance Stairs - Number of Steps: 1  Entrance Stairs - Rails: None  Home Equipment: Nyoka Feil, rolling  Has the patient had two or more falls in the past year or any fall with injury in the past year?: Yes  Receives Help From: Family  ADL Assistance: [address] [place] [address] Avenue: Independent  Ambulation Assistance: Independent  Transfer Assistance: Independent  Active : No  Patient's  Info: sister  Mode of Transportation: Car  Occupation: On disability  Type of Occupation: nurse  OBJECTIVE:     PAIN: Mahendra Remak / O2: PRECAUTION / Hanh Smiling / Cookie Ashlee:   Pre Treatment: 0         Post Treatment: 2/10 Vitals        Oxygen    Purewick    RESTRICTIONS/PRECAUTIONS:  Restrictions/Precautions  Restrictions/Precautions: Fall Risk, Weight Bearing  Lower Extremity Weight Bearing Restrictions  Left Lower Extremity Weight Bearing: Weight Bearing As Tolerated (for transfer only)  Restrictions/Precautions: Fall Risk, Weight Bearing     MOBILITY: I Mod I S SBA CGA Min Mod Max Total  NT x2 Comments:   Bed Mobility    Rolling [] [] [] [] [] [] [] [] [] [] []    Supine to Sit [] [] [] [] [] [] [] [] [] [] []    Scooting [] [] [] [x] [] [] [] [] [] [] []    Sit to Supine [] [] [] [] [] [] [] [] [] [] []    Transfers    Sit to Stand [] [] [] [] [] [x] [] [] [] [] [] Given cues for hand placement    Bed to Chair [] [] [] [] [x] [x] [] [] [] [] []    Stand to Sit [] [] [] [] [] [x] [] [] [] [] []     [] [] [] [] [] [] [] [] [] [] []    I=Independent, Mod I=Modified Independent, S=Supervision, SBA=Standby Assistance, CGA=Contact Guard Assistance,   Min=Minimal Assistance, Mod=Moderate Assistance, Max=Maximal Assistance, Total=Total Assistance, NT=Not Tested    BALANCE: Good Fair+ Fair Fair- Poor NT Comments   Sitting Static [x] [] [] [] [] []    Sitting Dynamic [x] [] [] [] [] [] Standing Static [] [] [x] [] [] []    Standing Dynamic [] [] [x] [] [] []      GAIT: I Mod I S SBA CGA Min Mod Max Total  NT x2 Comments:   Level of Assistance [] [] [] [] [] [] [] [] [] [] []    Distance   feet    DME N/A    Gait Quality N/A    Weightbearing Status      Stairs      I=Independent, Mod I=Modified Independent, S=Supervision, SBA=Standby Assistance, CGA=Contact Guard Assistance,   Min=Minimal Assistance, Mod=Moderate Assistance, Max=Maximal Assistance, Total=Total Assistance, NT=Not Tested    PLAN:   FREQUENCY AND DURATION: BID for duration of hospital stay or until stated goals are met, whichever comes first.    TREATMENT:   TREATMENT:   Therapeutic Activity (31 Minutes): Therapeutic activity included Scooting, Transfer Training, Sitting balance , and Standing balance to improve functional Activity tolerance, Balance, Coordination, Mobility, Strength, and ROM.     TREATMENT GRID:  N/A    AFTER TREATMENT PRECAUTIONS: Bed/Chair Locked, Call light within reach, Chair, Needs within reach, and RN notified    INTERDISCIPLINARY COLLABORATION:  RN/ PCT and PT/ PTA    EDUCATION:      TIME IN/OUT:  Time In: 1033  Time Out: 1104  Minutes: [address], PTA

## 2022-10-05 NOTE — PROGRESS NOTES
ACUTE PHYSICAL THERAPY GOALS:   (Developed with and agreed upon by patient and/or caregiver.)  (1.) Rebeka Matos  will move from supine to sit and sit to supine , scoot up and down, and roll side to side with MODIFIED INDEPENDENCE within 7 treatment day(s). (2.) Rebeka Matos will transfer from bed to chair and chair to bed with SUPERVISION using the least restrictive device within 7 treatment day(s). (3.) Rebeka Matos will perform w/c mobility in room/hallway for 100 ft at SUPERVISION within 7 treatment days  (4.) Rebeka Matos will perform bilateral lower extremity exercises x 15 min for HEP with MODIFIED INDEPENDENCE to improve strength, endurance, and functional mobility within 7 treatment day(s). PHYSICAL THERAPY: Daily Note PM   (Link to Caseload Tracking: PT Visit Days : 2  Time In/Out PT Charge Capture  Rehab Caseload Tracker  Orders    Rebeka Matos is a 75 y.o. female   PRIMARY DIAGNOSIS: Nondisp bimalleol fx l low leg, init for opn fx type I/2  Closed bimalleolar fracture of left ankle, initial encounter [S82.842A]  Nondisp bimalleol fx l low leg, init for opn fx type I/2 [S82.845B]  Procedure(s) (LRB):  LEFT ANKLE OPEN REDUCTION INTERNAL FIXATION (Left)  2 Days Post-Op  Inpatient: Payor: Marilyn Cao / Plan: Maxi Avilez PPO / Product Type: Medicare /     ASSESSMENT:     REHAB RECOMMENDATIONS:   Recommendation to date pending progress:  Setting:  Inpatient Rehab Facility    Equipment:    To Be Determined     ASSESSMENT:  Ms. Armando Alvarado continues to demonstrate good progress toward goals with increased activity tolerance and decreased assistance levels. She performed multiple STS transfers with cueing for hand placement and technique. Static and dynamic standing balance performed during self-care with mild trunk sway but no loss of balance. Attempted hopping gait technique again with assist of 2 to prevent WB, but patient was still unable to successfully hop.        SUBJECTIVE: Ms. Kenny Pierson states, \"I'm so glad I stayed up that long\"     Social/Functional Lives With: Alone  Type of Home: House  Home Layout: One level  Home Access: Stairs to enter with rails  Entrance Stairs - Number of Steps: 1  Entrance Stairs - Rails: None  Home Equipment: Baylee Schillings, rolling  Has the patient had two or more falls in the past year or any fall with injury in the past year?: Yes  Receives Help From: Family  ADL Assistance: [address] [place] [address] Avenue: Independent  Ambulation Assistance: Independent  Transfer Assistance: Independent  Active : No  Patient's  Info: sister  Mode of Transportation: Car  Occupation: On disability  Type of Occupation: nurse  OBJECTIVE:     PAIN: Mar Ripa / O2: PRECAUTION / Sheree Beckman / Checo Du:   Pre Treatment: 0         Post Treatment: 0 Vitals        Oxygen    Purewick    RESTRICTIONS/PRECAUTIONS:  Restrictions/Precautions  Restrictions/Precautions: Fall Risk, Weight Bearing  Lower Extremity Weight Bearing Restrictions  Left Lower Extremity Weight Bearing: Weight Bearing As Tolerated (for transfer only)  Restrictions/Precautions: Fall Risk, Weight Bearing     MOBILITY: I Mod I S SBA CGA Min Mod Max Total  NT x2 Comments:   Bed Mobility    Rolling [] [] [] [] [] [] [] [] [] [] []    Supine to Sit [] [] [] [] [] [] [] [] [] [] []    Scooting [] [] [] [x] [] [] [] [] [] [] []    Sit to Supine [] [] [] [] [x] [] [] [] [] [] []    Transfers    Sit to Stand [] [] [] [] [x] [x] [] [] [] [] [] Given cues for hand placement    Bed to Chair [] [] [] [] [x] [x] [] [] [] [] []    Stand to Sit [] [] [] [] [] [x] [] [] [] [] []     [] [] [] [] [] [] [] [] [] [] []    I=Independent, Mod I=Modified Independent, S=Supervision, SBA=Standby Assistance, CGA=Contact Guard Assistance,   Min=Minimal Assistance, Mod=Moderate Assistance, Max=Maximal Assistance, Total=Total Assistance, NT=Not Tested    BALANCE: Good Fair+ Fair Fair- Poor NT Comments   Sitting Static [x] [] [] [] [] []    Sitting Dynamic [x] [] [] [] [] []              Standing Static [] [x] [x] [] [] []    Standing Dynamic [] [] [x] [] [] []      GAIT: I Mod I S SBA CGA Min Mod Max Total  NT x2 Comments:   Level of Assistance [] [] [] [] [] [] [] [] [] [] []    Distance   feet    DME N/A    Gait Quality N/A    Weightbearing Status      Stairs      I=Independent, Mod I=Modified Independent, S=Supervision, SBA=Standby Assistance, CGA=Contact Guard Assistance,   Min=Minimal Assistance, Mod=Moderate Assistance, Max=Maximal Assistance, Total=Total Assistance, NT=Not Tested    PLAN:   FREQUENCY AND DURATION: BID for duration of hospital stay or until stated goals are met, whichever comes first.    TREATMENT:   TREATMENT:   Co-Treatment PT/OT necessary due to patient's decreased overall endurance/tolerance levels, as well as need for high level skilled assistance to complete functional transfers/mobility and functional tasks  Therapeutic Activity (26 Minutes): Therapeutic activity included Sit to Supine, Scooting, Transfer Training, Ambulation on level ground, Sitting balance , and Standing balance to improve functional Activity tolerance, Balance, Coordination, Mobility, Strength, and ROM.     TREATMENT GRID:  N/A    AFTER TREATMENT PRECAUTIONS: Bed, Bed/Chair Locked, Call light within reach, Needs within reach, and RN notified    INTERDISCIPLINARY COLLABORATION:  RN/ PCT, PT/ PTA, and OT/ PASCAL    EDUCATION:      TIME IN/OUT:  Time In: 1440  Time Out: [place]  Minutes: 2[address] Ashwin Peters PTA

## 2022-10-05 NOTE — CARE COORDINATION
Pt has been accepted for admission to [facility] into a private room. Insurance auth request initiated yesterday. CM notified pt of bed offer and plan and she is in agreement. CM following to facilitate pt's transfer to rehab at [facility].

## 2022-10-05 NOTE — PROGRESS NOTES
ACUTE OCCUPATIONAL THERAPY GOALS:   (Developed with and agreed upon by patient and/or caregiver.)  1. Patient will complete lower body bathing and dressing with SUPERVISION and adaptive equipment as needed. 2. Patient will complete toileting with SUPERVISION. 3. Patient will tolerate 25 minutes of OT treatment with 1-2 rest breaks to increase activity tolerance for ADLs. 4. Patient will complete functional transfers with SUPERVISION while maintaining Wbing restrictions and adaptive equipment as needed. Timeframe: 7 visits     OCCUPATIONAL THERAPY: Daily Note PM   OT Visit Days: 2   Time  OT Charge Capture  Rehab Caseload Tracker  OT Orders    Harriet Ibanez is a 75 y.o. female   PRIMARY DIAGNOSIS: Nondisp bimalleol fx l low leg, init for opn fx type I/2  Closed bimalleolar fracture of left ankle, initial encounter [S82.842A]  Nondisp bimalleol fx l low leg, init for opn fx type I/2 [S82.845B]  Procedure(s) (LRB):  LEFT ANKLE OPEN REDUCTION INTERNAL FIXATION (Left)  2 Days Post-Op  Inpatient: Payor: Hattie House / Plan: [employer] PPO / Product Type: Medicare /     ASSESSMENT:     REHAB RECOMMENDATIONS: CURRENT LEVEL OF FUNCTION:  (Most Recently Demonstrated)   Recommendation to date pending progress:  Setting:  Short-term Rehab    Equipment:    To Be Determined Bathing:  Minimal Assist  Dressing:  Minimal Assist  Feeding/Grooming:  Contact Guard Assist  Toileting:  Minimal Assist  Functional Mobility:  Minimal Assist     ASSESSMENT:  Ms. Poppy Renae is a 76 y/o female who presents POD#2 with L ankle ORIF and WBAT for transfers only with walker. At baseline pt is very active and independent and lives in her home alone. This date, CGA with RW for sit<>stand and stand pivot transfers, min A with RW while NWB through LLE (hopping single leg) to bed. Completed BUE exercises while seated in chair. Completed toileting and LBD with min A while at [facility].  Today pt presents with decreased activity tolerance and strength impacting ADLs. Pt is currently functioning below baseline and would benefit from skilled OT services to address OT goals and plan of care.  Rec STR at d/c.          SUBJECTIVE:     Ms. Lori Leahy states, \"My upper body is strong\"     Social/Functional Lives With: Alone  Type of Home: House  Home Layout: One level  Home Access: Stairs to enter with rails  Entrance Stairs - Number of Steps: 1  Entrance Stairs - Rails: None  Home Equipment: Tigre Show, rolling  Has the patient had two or more falls in the past year or any fall with injury in the past year?: Yes  Receives Help From: Family  ADL Assistance: Independent  Homemaking Assistance: Independent  Ambulation Assistance: Independent  Transfer Assistance: Independent  Active : No  Patient's  Info: sister  Mode of Transportation: Car  Occupation: On disability  Type of Occupation: nurse    OBJECTIVE:     Tika Freeman / Maldonado Caba / AIRWAY: Shannan Arcos    RESTRICTIONS/PRECAUTIONS:  Restrictions/Precautions  Restrictions/Precautions: Fall Risk, Weight Bearing  Lower Extremity Weight Bearing Restrictions  Left Lower Extremity Weight Bearing: Weight Bearing As Tolerated (for transfer only with RW)        PAIN: VITALS / O2:   Pre Treatment:    0/10          Post Treatment: 0/10, stated feeling sore Vitals          Oxygen            MOBILITY: I Mod I S SBA CGA Min Mod Max Total  NT x2 Comments:   Bed Mobility    Rolling [] [] [] [] [] [] [] [] [] [] []    Supine to Sit [] [] [] [] [] [] [] [] [] [] []    Scooting [] [] [] [] [] [] [] [] [] [] []    Sit to Supine [] [] [] [] [x] [] [] [] [] [] []    Transfers    Sit to Stand [] [] [] [] [x] [] [] [] [] [] []    Bed to Chair [] [] [] [] [] [x] [] [] [] [] [] With RW   Stand to Sit [] [] [] [] [x] [] [] [] [] [] []    Tub/Shower [] [] [] [] [] [] [] [] [] [] []     Toilet [] [] [] [] [] [] [] [] [] [] []      [] [] [] [] [] [] [] [] [] [] []    I=Independent, Mod I=Modified Independent, S=Supervision/Setup, SBA=Standby Assistance, CGA=Contact Guard Assistance, Min=Minimal Assistance, Mod=Moderate Assistance, Max=Maximal Assistance, Total=Total Assistance, NT=Not Tested    ACTIVITIES OF DAILY LIVING: I Mod I S SBA CGA Min Mod Max Total NT Comments   BASIC ADLs:              Upper Body   Bathing [] [] [] [] [] [] [] [] [] []    Lower Body Bathing [] [] [] [] [] [] [] [] [] []    Toileting [] [] [] [] [] [x] [] [] [] [] BSC   Upper Body Dressing [] [] [] [] [] [] [] [] [] []    Lower Body Dressing [] [] [] [] [] [x] [] [] [] []    Feeding [] [] [] [] [] [] [] [] [] []    Grooming [] [] [] [] [] [] [] [] [] []    Personal Device Care [] [] [] [] [] [] [] [] [] []    Functional Mobility [] [] [] [] [] [x] [] [] [] [] While NWBing through LLE, hop on RLE to bed with RW   I=Independent, Mod I=Modified Independent, S=Supervision/Setup, SBA=Standby Assistance, CGA=Contact Guard Assistance, Min=Minimal Assistance, Mod=Moderate Assistance, Max=Maximal Assistance, Total=Total Assistance, NT=Not Tested    BALANCE: Good Fair+ Fair Fair- Poor NT Comments   Sitting Static [x] [] [] [] [] []    Sitting Dynamic [] [x] [] [] [] []              Standing Static [] [x] [] [] [] []    Standing Dynamic [] [x] [] [] [] []        PLAN:     FREQUENCY/DURATION   OT Plan of Care: 3 times/week for duration of hospital stay or until stated goals are met, whichever comes first.    TREATMENT:     TREATMENT:   Co-Treatment PT/OT necessary due to patient's decreased overall endurance/tolerance levels, as well as need for high level skilled assistance to complete functional transfers/mobility and functional tasks  Therapeutic Exercise (10 Minutes): Therapeutic exercises noted below to improve functional strength. Neuromuscular Re-education (15 Minutes): Neuromuscular Re-education included Functional mobility with facilitation and Standing balance training to improve Balance and Functional Mobility.   Self Care (16 minutes): Patient participated in toileting ADLs in unsupported sitting and standing with minimal verbal and manual cueing to increase independence. Patient also participated in functional mobility and functional transfer training to increase independence and increase activity tolerance.      TREATMENT GRID:   Date:  10/5/2022 Date:   Date:     Activity/Exercise Parameters Parameters Parameters   Shoulder flex 15 reps     Elbow flex/elbow extension 15 reps     Scapular retraction 15reps                                 AFTER TREATMENT PRECAUTIONS: Bed, Bed/Chair Locked, Call light within reach, and Needs within reach    INTERDISCIPLINARY COLLABORATION:  RN/ PCT and PT/ PTA    EDUCATION:       TOTAL TREATMENT DURATION AND TIME:  Time In: 1426  Time Out: [facility]  Minutes: [address] [facility]

## 2022-10-05 NOTE — PROGRESS NOTES
Hospitalist Progress Note   Admit Date:  10/1/2022  1:42 PM   Name:  Jelani Stein   Age:  75 y.o. Sex:  female  :  1955   MRN:  [id number]   Room:  [facility]    Presenting Complaint: Ankle Pain     Reason(s) for Admission: Closed bimalleolar fracture of left ankle, initial encounter [R60.933E]  Nondisp bimalleol fx l low leg, init for opn fx type [address] [facility] Course:   Ms. Flakita Hayes is a 78 y.o. CF with a PMH of HTN, DM Type 1 since 48 + years, GERD, DM Polyneuropathy, CKD stage 3, Glaucoma, Hypothyroidism, Dry eyes and gait imbalance who was admitted on 10/1 with LLE bimalleolar fx. Also concerns of ongoing shuffle gait, seen by neurology with recommendations for daily low-dose ASA and extensive PT/OT. She was taken for surgery on Oct/03. The patient is a good historian and is well-versed in her medical conditions. Subjective & 24hr Events (10/05/22): No AEO. The patient says she slept well and required no analgesia overnight. She was up in the chair for 3 hours yesterday. She is afebrile and saturating well on room air. We plan for discharge to [facility] tomorrow.       Assessment & Plan:     Principal Problem:  Displaced bimalleolar L ankle fracture  POD 2, Open treatment of left bimalleolar ankle fracture with internal fixation  Circulatory checks per unit standards  **per neuro recs, ASA 81 mg to be initiated post surgery for stroke prevention  PRN analgesia  PT/OT  Follow up with Ortho 2 weeks post-discharge     Active Problems:  Chronic renal disease, stage III (HCC) [160729]  sCr stable     Post-surgical hypothyroidism  Synthroid home dose resumed     Gastroparesis  Aware     Essential hypertension  Continue ARB     Type 1 diabetes mellitus (HCC)  Lantus 30 units nightly  Continue medium correction scale; this matches her home scale  Use her DexCom for CBG checks     HLD (hyperlipidemia)  Start atorvastatin 40 mg daily     Polyneuropathy  PT/OT eval      Anticipated discharge needs: Pending      Diet:  ADULT DIET; Regular; 3 carb choices (45 gm/meal)  DVT PPx: per Ortho  Code status: Full Code    Hospital Problems:  Principal Problem:    Nondisp bimalleol fx l low leg, init for opn fx type I/2  Active Problems:    Chronic [facility] disease, stage III ([facility] [906163]    Post-surgical hypothyroidism    Gastroparesis    Essential hypertension    Type 1 diabetes mellitus (HCC)    HLD (hyperlipidemia)  Resolved Problems:    * No resolved hospital problems. *      Objective:   Patient Vitals for the past 24 hrs:   Temp Pulse Resp BP SpO2   10/05/22 0722 98.1 °F (36.7 °C) 71 18 (!) 152/77 95 %   10/05/22 0405 98.6 °F (37 °C) 80 20 (!) 127/51 94 %   10/04/22 2338 99 °F (37.2 °C) 67 16 (!) 133/59 95 %   10/04/22 1953 99.3 °F (37.4 °C) 81 18 (!) 152/68 96 %   10/04/22 1552 97.7 °F (36.5 °C) 86 17 (!) 150/73 97 %   10/04/22 1110 98.2 °F (36.8 °C) 94 18 (!) 118/58 96 %         Oxygen Therapy  SpO2: 95 %  Pulse Oximeter Device Mode: Continuous  O2 Device: None (Room air)  O2 Flow Rate (L/min): 2 L/min    Estimated body mass index is 24.84 kg/m² as calculated from the following:    Height as of this encounter: 5' 8\" (1.727 m). Weight as of this encounter: 163 lb 6.4 oz (74.1 kg). Intake/Output Summary (Last 24 hours) at 10/5/2022 1017  Last data filed at 10/5/2022 0911  Gross per 24 hour   Intake 240 ml   Output 750 ml   Net -510 ml           Physical Exam:   Blood pressure (!) 152/77, pulse 71, temperature 98.1 °F (36.7 °C), temperature source Oral, resp. rate 18, height 5' 8\" (1.727 m), weight 163 lb 6.4 oz (74.1 kg), SpO2 95 %. General:    No acute distress  Head:  Normocephalic, atraumatic  Eyes:  Sclerae appear normal.  Pupils equally round. ENT:  Nares appear normal, no drainage. Moist oral mucosa  Neck:  No restricted ROM. Trachea midline   CV:   RRR. murmur  Lungs:   Clear posteriorly. No wheezing. No tachypnea.   Abdomen:   Soft, nondistended. Extremities: No cyanosis or clubbing. Skin:     No rashes and normal coloration. Warm and dry. Neuro:  CN II-XII grossly intact. A&Ox3  Psych:  Normal mood and affect.       I have personally reviewed labs and tests showing:  Recent Labs:  Recent Results (from the past 48 hour(s))   TYPE AND SCREEN    Collection Time: 10/03/22 10:37 AM   Result Value Ref Range    Crossmatch expiration date 10/06/2022,2359     ABO/Rh A POSITIVE     Antibody Screen NEG    POCT Glucose    Collection Time: 10/03/22 11:04 AM   Result Value Ref Range    POC Glucose 108 (H) 65 - 100 mg/dL    Performed by: Glen Mccain    POCT Glucose    Collection Time: 10/03/22 11:33 AM   Result Value Ref Range    POC Glucose 111 (H) 65 - 100 mg/dL    Performed by: Winston Swann    POCT Glucose    Collection Time: 10/03/22  8:11 PM   Result Value Ref Range    POC Glucose 106 (H) 65 - 100 mg/dL    Performed by: Agustina(AS)    Basic Metabolic Panel w/ Reflex to MG    Collection Time: 10/04/22  5:27 AM   Result Value Ref Range    Sodium 137 136 - 145 mmol/L    Potassium 4.0 3.5 - 5.1 mmol/L    Chloride 103 101 - 110 mmol/L    CO2 28 21 - 32 mmol/L    Anion Gap 6 4 - 13 mmol/L    Glucose 112 (H) 65 - 100 mg/dL    BUN 10 8 - 23 MG/DL    Creatinine 0.60 0.6 - 1.0 MG/DL    Est, Glom Filt Rate >60 >60 ml/min/1.73m2    Calcium 9.0 8.3 - 10.4 MG/DL   CBC    Collection Time: 10/04/22  5:27 AM   Result Value Ref Range    WBC 7.0 4.3 - 11.1 K/uL    RBC 3.51 (L) 4.05 - 5.2 M/uL    Hemoglobin 11.6 (L) 11.7 - 15.4 g/dL    Hematocrit 35.2 (L) 35.8 - 46.3 %    .3 (H) 79.6 - 97.8 FL    MCH 33.0 (H) 26.1 - 32.9 PG    MCHC 33.0 31.4 - 35.0 g/dL    RDW 12.8 11.9 - 14.6 %    Platelets 741 368 - 094 K/uL    MPV 9.7 9.4 - 12.3 FL    nRBC 0.00 0.0 - 0.2 K/uL   POCT Glucose    Collection Time: 10/04/22  7:09 AM   Result Value Ref Range    POC Glucose 89 65 - 100 mg/dL    Performed by: Thalia)        I have personally reviewed imaging studies showing: Other Studies:  XR ANKLE LEFT (MIN 3 VIEWS)   Final Result   Postoperative findings present in the ankle. Midfoot degenerative   joint disease. NC XR TECHNOLOGIST SERVICE   Final Result      XR CHEST PORTABLE   Final Result   No evidence of an acute intrathoracic process. Mild left basilar atelectasis. CT HEAD WO CONTRAST   Final Result   Chronic appearing changes without acute intracranial abnormality. XR ANKLE LEFT (MIN 3 VIEWS)   Final Result   Fractures of the medial and lateral malleoli.           Current Meds:  Current Facility-Administered Medications   Medication Dose Route Frequency    guaiFENesin (MUCINEX) extended release tablet 600 mg  600 mg Oral BID    cetirizine (ZYRTEC) tablet 10 mg  10 mg Oral Daily    enoxaparin (LOVENOX) injection 40 mg  40 mg SubCUTAneous Daily    aspirin chewable tablet 81 mg  81 mg Oral Nightly    atorvastatin (LIPITOR) tablet 40 mg  40 mg Oral Nightly    morphine injection 4 mg  4 mg IntraVENous Q1H PRN    oyster shell calcium w/D 500-200 MG-UNIT tablet 1 tablet  1 tablet Oral BID    carboxymethylcellulose (THERATEARS) 1 % ophthalmic gel 1 drop  1 drop Ophthalmic Daily PRN    insulin lispro (HUMALOG) injection vial 2 Units  2 Units SubCUTAneous 4x Daily AC & HS    insulin glargine (LANTUS) injection vial 30 Units  30 Units SubCUTAneous Nightly    levothyroxine (SYNTHROID) tablet 112 mcg  112 mcg Oral QAM AC    cetirizine (ZYRTEC) tablet 10 mg  10 mg Oral Daily PRN    losartan (COZAAR) tablet 100 mg  100 mg Oral Daily    miconazole (MICOTIN) 2 % powder   Topical BID    ondansetron (ZOFRAN-ODT) disintegrating tablet 8 mg  8 mg Oral Q8H PRN    pantoprazole (PROTONIX) tablet 40 mg  40 mg Oral Daily    ketotifen (ZADITOR) 0.025 % ophthalmic solution 1 drop  1 drop Both Eyes BID    pilocarpine (PILOCAR) 1 % ophthalmic solution 1 drop  1 drop Left Eye BID    sennosides-docusate sodium (SENOKOT-S) 8.6-50 MG tablet 1 tablet  1 tablet Oral Daily PRN    latanoprost (XALATAN) 0.005 % ophthalmic solution 1 drop  1 drop Both Eyes Nightly    sodium chloride flush 0.9 % injection 5-40 mL  5-40 mL IntraVENous 2 times per day    sodium chloride flush 0.9 % injection 5-40 mL  5-40 mL IntraVENous PRN    0.9 % sodium chloride infusion   IntraVENous PRN    polyethylene glycol (GLYCOLAX) packet 17 g  17 g Oral Daily PRN    acetaminophen (TYLENOL) tablet 650 mg  650 mg Oral Q6H PRN    Or    acetaminophen (TYLENOL) suppository 650 mg  650 mg Rectal Q6H PRN    glucose chewable tablet 16 g  4 tablet Oral PRN    dextrose bolus 10% 125 mL  125 mL IntraVENous PRN    Or    dextrose bolus 10% 250 mL  250 mL IntraVENous PRN    glucagon (rDNA) injection 1 mg  1 mg SubCUTAneous PRN    dextrose 10 % infusion   IntraVENous Continuous PRN    HYDROcodone-acetaminophen (NORCO) 7.5-325 MG per tablet 1 tablet  1 tablet Oral Q6H PRN    insulin lispro (HUMALOG) injection vial 0-8 Units  0-8 Units SubCUTAneous TID WC    insulin lispro (HUMALOG) injection vial 0-4 Units  0-4 Units SubCUTAneous Nightly       Signed:  Yue Mora, LON - CNP    Part of this note may have been written by using a voice dictation software. The note has been proof read but may still contain some grammatical/other typographical errors.

## 2022-10-06 ENCOUNTER — APPOINTMENT (OUTPATIENT)
Dept: GENERAL RADIOLOGY | Age: 67
DRG: 494 | End: 2022-10-06
Payer: MEDICARE

## 2022-10-06 VITALS
DIASTOLIC BLOOD PRESSURE: 94 MMHG | HEART RATE: 79 BPM | TEMPERATURE: 98.4 F | SYSTOLIC BLOOD PRESSURE: 157 MMHG | BODY MASS INDEX: 24.77 KG/M2 | WEIGHT: 163.4 LBS | RESPIRATION RATE: 20 BRPM | OXYGEN SATURATION: 100 % | HEIGHT: 68 IN

## 2022-10-06 LAB
SARS-COV-2 RDRP RESP QL NAA+PROBE: NOT DETECTED
SOURCE: NORMAL

## 2022-10-06 PROCEDURE — 90471 IMMUNIZATION ADMIN: CPT | Performed by: FAMILY MEDICINE

## 2022-10-06 PROCEDURE — 6370000000 HC RX 637 (ALT 250 FOR IP): Performed by: ORTHOPAEDIC SURGERY

## 2022-10-06 PROCEDURE — 97110 THERAPEUTIC EXERCISES: CPT

## 2022-10-06 PROCEDURE — 90686 IIV4 VACC NO PRSV 0.5 ML IM: CPT | Performed by: FAMILY MEDICINE

## 2022-10-06 PROCEDURE — 2580000003 HC RX 258: Performed by: ORTHOPAEDIC SURGERY

## 2022-10-06 PROCEDURE — 6370000000 HC RX 637 (ALT 250 FOR IP): Performed by: NURSE PRACTITIONER

## 2022-10-06 PROCEDURE — 87635 SARS-COV-2 COVID-19 AMP PRB: CPT

## 2022-10-06 PROCEDURE — 97530 THERAPEUTIC ACTIVITIES: CPT

## 2022-10-06 PROCEDURE — 6360000002 HC RX W HCPCS: Performed by: FAMILY MEDICINE

## 2022-10-06 PROCEDURE — 71045 X-RAY EXAM CHEST 1 VIEW: CPT

## 2022-10-06 PROCEDURE — 6360000002 HC RX W HCPCS: Performed by: NURSE PRACTITIONER

## 2022-10-06 RX ORDER — ATORVASTATIN CALCIUM 40 MG/1
40 TABLET, FILM COATED ORAL NIGHTLY
Qty: 30 TABLET | Refills: 0
Start: 2022-10-06

## 2022-10-06 RX ORDER — HYDROCODONE BITARTRATE AND ACETAMINOPHEN 7.5; 325 MG/1; MG/1
1 TABLET ORAL EVERY 6 HOURS PRN
Qty: 12 TABLET | Refills: 0 | Status: SHIPPED | OUTPATIENT
Start: 2022-10-06 | End: 2022-10-09

## 2022-10-06 RX ORDER — GUAIFENESIN 600 MG/1
600 TABLET, EXTENDED RELEASE ORAL 2 TIMES DAILY
Qty: 14 TABLET | Refills: 0
Start: 2022-10-06 | End: 2022-10-13

## 2022-10-06 RX ORDER — ASPIRIN 81 MG/1
81 TABLET, CHEWABLE ORAL NIGHTLY
Qty: 30 TABLET | Refills: 0
Start: 2022-10-06

## 2022-10-06 RX ADMIN — ACETAMINOPHEN 650 MG: 325 TABLET, FILM COATED ORAL at 13:48

## 2022-10-06 RX ADMIN — INSULIN LISPRO 2 UNITS: 100 INJECTION, SOLUTION INTRAVENOUS; SUBCUTANEOUS at 07:04

## 2022-10-06 RX ADMIN — ENOXAPARIN SODIUM 40 MG: 100 INJECTION SUBCUTANEOUS at 09:20

## 2022-10-06 RX ADMIN — Medication 1 TABLET: at 09:20

## 2022-10-06 RX ADMIN — PANTOPRAZOLE SODIUM 40 MG: 40 TABLET, DELAYED RELEASE ORAL at 09:20

## 2022-10-06 RX ADMIN — LEVOTHYROXINE SODIUM 112 MCG: 0.11 TABLET ORAL at 05:31

## 2022-10-06 RX ADMIN — INFLUENZA VIRUS VACCINE 0.5 ML: 15; 15; 15; 15 SUSPENSION INTRAMUSCULAR at 13:39

## 2022-10-06 RX ADMIN — INSULIN LISPRO 2 UNITS: 100 INJECTION, SOLUTION INTRAVENOUS; SUBCUTANEOUS at 11:36

## 2022-10-06 RX ADMIN — LOSARTAN POTASSIUM 100 MG: 50 TABLET, FILM COATED ORAL at 09:20

## 2022-10-06 RX ADMIN — SODIUM CHLORIDE, PRESERVATIVE FREE 10 ML: 5 INJECTION INTRAVENOUS at 09:22

## 2022-10-06 RX ADMIN — GUAIFENESIN 600 MG: 600 TABLET ORAL at 09:20

## 2022-10-06 ASSESSMENT — PAIN DESCRIPTION - DESCRIPTORS: DESCRIPTORS: ACHING

## 2022-10-06 ASSESSMENT — PAIN DESCRIPTION - ORIENTATION: ORIENTATION: LEFT

## 2022-10-06 ASSESSMENT — PAIN DESCRIPTION - LOCATION: LOCATION: ANKLE

## 2022-10-06 ASSESSMENT — PAIN SCALES - GENERAL: PAINLEVEL_OUTOF10: 1

## 2022-10-06 NOTE — DISCHARGE SUMMARY
Hospitalist Discharge Summary   Admit Date:  10/1/2022  1:42 PM   DC Note date: 10/6/2022  Name:  Beth Dalal   Age:  75 y.o. Sex:  female  :  1955   MRN:  [id number]   Room:  [facility]  PCP:  Aneta Corona DO    Presenting Complaint: Ankle Pain     Initial Admission Diagnosis: Closed bimalleolar fracture of left ankle, initial encounter [S82.842A]  Nondisp bimalleol fx l low leg, init for opn fx type I/2 [S82.845B]     Problem List for this Hospitalization (present on admission):    Principal Problem:    Nondisp bimalleol fx l low leg, init for opn fx type I/2  Active Problems:    Chronic renal disease, stage III ([facility] [652691]    Post-surgical hypothyroidism    Gastroparesis    Essential hypertension    Type 1 diabetes mellitus ([facility]    HLD (hyperlipidemia)  Resolved Problems:    * No resolved hospital problems. [facility] Course:  Ms. Avtar Corral is a 78 y.o. CF with a PMH of HTN, DM Type 1 since 48 + years, GERD, DM Polyneuropathy, CKD stage 3, Glaucoma, Hypothyroidism, Dry eyes and gait imbalance who was admitted on 10/1 with LLE bimalleolar fx. Also concerns of ongoing shuffle gait, but refused MRI due to claustrophobia. Seen by neurology with recommendations for daily low-dose ASA/statin and extensive PT/OT. She was taken for surgery on Oct/03. S/P Open treatment of left bimalleolar ankle fracture with internal fixation. Therapy with recs for rehab and bed secured at [facility]. Patient alert and oriented x 4 denying excessive pain, N/V, poor appetite and discharged to rehab for further therapies and order for Ortho follow-up    Disposition: STR  Diet: ADULT DIET; Regular; 3 carb choices (45 gm/meal)  Code Status: Full Code    Follow Ups:   Contact information for follow-up providers     Jonas Mohr MD. Schedule an appointment as soon as possible for a   visit in 2 week(s).     Specialties: Orthopaedic Trauma Surgery, Orthopedic Surgery  Why: For suture removal, For wound re-check  Contact information:  [address]  [phone]                   Contact information for after-discharge care     Discharge [facility]) . Service: Skilled Nursing  Contact information:  [address] [address]  [phone]                           Time spent in patient discharge and coordination 41 minutes. Follow up labs/diagnostics (ultimately defer to outpatient provider):      Plan was discussed with patient. All questions answered. Patient was stable at time of discharge. Instructions given to call a physician or return if any concerns. Current Discharge Medication List        START taking these medications    Details   guaiFENesin (MUCINEX) 600 MG extended release tablet Take 1 tablet by mouth 2 times daily for 7 days  Qty: 14 tablet, Refills: 0      atorvastatin (LIPITOR) 40 MG tablet Take 1 tablet by mouth nightly  Qty: 30 tablet, Refills: 0      aspirin 81 MG chewable tablet Take 1 tablet by mouth at bedtime  Qty: 30 tablet, Refills: 0      HYDROcodone-acetaminophen (NORCO) 7.5-325 MG per tablet Take 1 tablet by mouth every 6 hours as needed for Pain for up to 3 days. Qty: 12 tablet, Refills: 0    Comments: Reduce doses taken as pain becomes manageable  Associated Diagnoses: Nondisp bimalleol fx l low leg, init for opn fx type I/2           CONTINUE these medications which have NOT CHANGED    Details   HUMALOG KWIKPEN 100 UNIT/ML SOPN Inject into the skin 2 units before meals plus correction scale 2/50>200, max daily dose 30 units  Qty: 30 mL, Refills: 3      Methylcobalamin (METHYL B-12 PO) Take 1,000 mcg by mouth daily.       B-D ULTRAFINE III SHORT PEN 31G X 8 MM MISC USE 1 PEN NEEDLE TO INJECT INSULIN FIVE TIMES DAILY      ketoconazole (NIZORAL) 2 % shampoo USE SHAMPOO TO WASH SCALP 2-3 TIMES A WEEK, LET SIT 15 MINUTES PRIOR TO RINSING      Contact Lens Care Products (THERATEARS) MISC Take 1 tablet by mouth 3 times daily      hydrocortisone 2.5 % cream APPLY TOPICALLY TO FACE TWICE DAILY AS NEEDED FOR ITCHING      PATADAY 0.2 % SOLN ophthalmic solution INSTILL 1 DROP EVERY DAY INTO BOTH EYES      TURMERIC PO Take 700 mg by mouth daily      Alpha-Lipoic Acid 600 MG CAPS Take 1 tablet by mouth 2 times daily      Calcium Carbonate-Vitamin D (CALCIUM-VITAMIN D) 600-125 MG-UNIT TABS Take 1 tablet by mouth 2 times daily      Carboxymethylcellulose Sodium 0.25 % SOLN Apply 1 drop to eye daily as needed (glaucoma)      Glucagon (GVOKE HYPOPEN 2-PACK) 1 MG/0.2ML SOAJ Inject 1 mg into the skin as needed      insulin glargine (LANTUS SOLOSTAR) 100 UNIT/ML injection pen Inject 30 Units into the skin nightly      levothyroxine (SYNTHROID) 112 MCG tablet Take 112 mcg by mouth every morning (before breakfast)      loratadine (CLARITIN) 10 MG tablet Take 10 mg by mouth daily as needed      losartan (COZAAR) 100 MG tablet Take 100 mg by mouth daily      Lutein-Zeaxanthin 20-1 MG CAPS Take 25 mg by mouth daily      nystatin (MYCOSTATIN) 773419 UNIT/GM powder Apply topically 4 times daily      ondansetron (ZOFRAN-ODT) 8 MG TBDP disintegrating tablet Take 8 mg by mouth every 8 hours as needed for Nausea or Vomiting      pantoprazole (PROTONIX) 40 MG tablet Take 40 mg by mouth daily      pilocarpine (PILOCAR) 1 % ophthalmic solution INSTILL 1 DROP INTO THE LEFT EYE TWICE DAILY      senna-docusate (PERICOLACE) 8.6-50 MG per tablet Take 1 tablet by mouth daily as needed      Travoprost, BAK Free, (TRAVATAN Z) 0.004 % SOLN ophthalmic solution Place 1 drop into both eyes nightly           STOP taking these medications       insulin aspart (NOVOLOG FLEXPEN) 100 UNIT/ML injection pen Comments:   Reason for Stopping:               Procedures done this admission:  Procedure(s):  LEFT ANKLE OPEN REDUCTION INTERNAL FIXATION    Consults this admission:  IP CONSULT TO NEUROLOGY  IP CONSULT TO SOCIAL WORK  IP CONSULT TO ORTHOPEDIC SURGERY    Echocardiogram results:  No results found for this or any previous visit. Diagnostic Imaging/Tests:   XR ANKLE LEFT (MIN 3 VIEWS)    Result Date: 10/3/2022  Postoperative findings present in the ankle. Midfoot degenerative joint disease. XR ANKLE LEFT (MIN 3 VIEWS)    Result Date: 10/1/2022  Fractures of the medial and lateral malleoli. CT HEAD WO CONTRAST    Result Date: 10/1/2022  Chronic appearing changes without acute intracranial abnormality. XR CHEST PORTABLE    Result Date: 10/2/2022  No evidence of an acute intrathoracic process. Mild left basilar atelectasis. Labs: Results:       BMP, Mg, Phos Recent Labs     10/04/22  0527      K 4.0      CO2 28   ANIONGAP 6   BUN 10   CREATININE 0.60   LABGLOM >60   CALCIUM 9.0   GLUCOSE 112*      CBC Recent Labs     10/04/22  0527   WBC 7.0   RBC 3.51*   HGB 11.6*   HCT 35.2*   .3*   MCH 33.0*   MCHC 33.0   RDW 12.8      MPV 9.7   NRBC 0.00      LFT No results for input(s): BILITOT, BILIDIR, ALKPHOS, AST, ALT, PROT, LABALBU, GLOB in the last 72 hours.    Cardiac  Lab Results   Component Value Date/Time    TROPHS 12.6 10/02/2022 04:45 AM    TROPHS 13.9 10/02/2022 01:27 AM    TROPHS 18.0 10/01/2022 09:20 PM      Coags Lab Results   Component Value Date/Time    PROTIME 12.7 10/02/2022 04:45 AM    INR 0.9 10/02/2022 04:45 AM    APTT 29.7 10/02/2022 04:45 AM      A1c Lab Results   Component Value Date/Time    LABA1C 6.2 06/24/2022 08:30 AM    LABA1C 6.6 12/21/2021 09:40 AM    LABA1C 5.7 01/15/2021 08:23 AM     06/24/2022 08:30 AM     12/21/2021 09:40 AM     01/15/2021 08:23 AM      Lipids Lab Results   Component Value Date/Time    CHOL 237 10/02/2022 04:45 AM    LDLCALC 118.8 10/02/2022 04:45 AM    LABVLDL 20.2 10/02/2022 04:45 AM    HDL 98 10/02/2022 04:45 AM    CHOLHDLRATIO 2.4 10/02/2022 04:45 AM    TRIG 101 10/02/2022 04:45 AM      Thyroid  Lab Results   Component Value Date/Time    DGZ6QIS 4.380 06/24/2022 08:30 AM        Most Recent UA No results found for: COLORU, APPEARANCE, SPECGRAV, LABPH, PROTEINU, GLUCOSEU, KETUA, BILIRUBINUR, BLOODU, UROBILINOGEN, NITRU, LEUKOCYTESUR, WBCUA, RBCUA, EPITHUA, BACTERIA, LABCAST, MUCUS     No results for input(s): CULTURE in the last 720 hours. All Labs from Last 24 Hrs:  No results found for this or any previous visit (from the past 24 hour(s)). Allergies   Allergen Reactions    Lisinopril Other (See Comments)    Mirabegron Hives    Buspirone Palpitations     Immunization History   Administered Date(s) Administered    COVID-19, PFIZER PURPLE top, DILUTE for use, (age 15 y+), 30mcg/0.3mL 01/28/2021, 02/16/2021, 11/16/2021    Influenza Virus Vaccine 09/30/2013, 10/01/2015, 10/01/2017, 10/03/2018    Influenza, FLUAD, (age 72 y+), Adjuvanted, 0.5mL 10/07/2020, 09/28/2021    Influenza, FLUCELVAX, (age 10 mo+), MDCK, PF, 0.5mL 10/06/2019    Pneumococcal Polysaccharide ([id number]) 11/09/2020    Pneumococcal Vaccine 11/19/2013    Tdap (Boostrix, Adacel) 11/07/2016       Recent Vital Data:  Patient Vitals for the past 24 hrs:   Temp Pulse Resp BP SpO2   10/06/22 0729 97.3 °F (36.3 °C) 74 16 (!) 154/67 97 %   10/06/22 0522 98.2 °F (36.8 °C) 69 18 (!) 155/62 95 %   10/05/22 2305 98.4 °F (36.9 °C) 73 16 (!) 134/56 95 %   10/05/22 1913 99 °F (37.2 °C) 78 20 (!) 144/55 98 %   10/05/22 1512 97.5 °F (36.4 °C) 76 18 (!) 151/70 97 %       Oxygen Therapy  SpO2: 97 %  Pulse Oximeter Device Mode: Continuous  O2 Device: None (Room air)  O2 Flow Rate (L/min): 2 L/min    Estimated body mass index is 24.84 kg/m² as calculated from the following:    Height as of this encounter: 5' 8\" (1.727 m). Weight as of this encounter: 163 lb 6.4 oz (74.1 kg). Intake/Output Summary (Last 24 hours) at 10/6/2022 1306  Last data filed at 10/5/2022 2040  Gross per 24 hour   Intake 240 ml   Output 1400 ml   Net -1160 ml         Physical Exam:    General:    Well nourished. No overt distress  Head:  Normocephalic, atraumatic  Eyes:  Sclerae appear normal.  Pupils equally round. HENT:  Nares appear normal, no drainage. Moist mucous membranes  Neck:  No restricted ROM. Trachea midline  CV:   RRR. No m/r/g. No JVD  Lungs:   CTAB. No wheezing, rhonchi, or rales. Respirations even, unlabored  Abdomen:   Soft, nontender, nondistended. Extremities: Warm and dry. No cyanosis or clubbing. No edema. Skin:     No rashes. Normal coloration  Neuro:  CN II-XII grossly intact. Psych:  Normal mood and affect. Signed:  LON Marc - CNP    Part of this note may have been written by using a voice dictation software. The note has been proof read but may still contain some grammatical/other typographical errors.

## 2022-10-06 NOTE — PROGRESS NOTES
ACUTE PHYSICAL THERAPY GOALS:   (Developed with and agreed upon by patient and/or caregiver.)  (1.) Mouna Nelson  will move from supine to sit and sit to supine , scoot up and down, and roll side to side with MODIFIED INDEPENDENCE within 7 treatment day(s). (2.) Mouna Nelson will transfer from bed to chair and chair to bed with SUPERVISION using the least restrictive device within 7 treatment day(s). (3.) Mouna Nelson will perform w/c mobility in room/hallway for 100 ft at SUPERVISION within 7 treatment days  (4.) Mouna Nelson will perform bilateral lower extremity exercises x 15 min for HEP with MODIFIED INDEPENDENCE to improve strength, endurance, and functional mobility within 7 treatment day(s). PHYSICAL THERAPY: Daily Note AM   (Link to Caseload Tracking: PT Visit Days : 2  Time In/Out PT Charge Capture  Rehab Caseload Tracker  Orders    Mouna Nelson is a 75 y.o. female   PRIMARY DIAGNOSIS: Nondisp bimalleol fx l low leg, init for opn fx type I/2  Closed bimalleolar fracture of left ankle, initial encounter [S82.842A]  Nondisp bimalleol fx l low leg, init for opn fx type I/2 [S82.845B]  Procedure(s) (LRB):  LEFT ANKLE OPEN REDUCTION INTERNAL FIXATION (Left)  3 Days Post-Op  Inpatient: Payor: Teodora Lea / Plan: Woodford Kanner PPO / Product Type: Medicare /     ASSESSMENT:     REHAB RECOMMENDATIONS:   Recommendation to date pending progress:  Setting:  Inpatient Rehab Facility    Equipment:    To Be Determined     ASSESSMENT:  Ms. Orlando Bryan is making steady progress toward goals. She performed bed mobility with SBA and good sitting balance EOB. STS and SPT still require CGA/close SBA, but she is able to take 2-3 small steps over to the chair to transfer. Good participation with exercises and the patient reports she has also been performing them on her own.       SUBJECTIVE:   Ms. Orlando Bryan states, \"I definitely want to get in the chair\"     Social/Functional Lives With: Alone  Type of Home: House  Home Layout: One level  Home Access: Stairs to enter with rails  Entrance Stairs - Number of Steps: 1  Entrance Stairs - Rails: None  Home Equipment: [place] Bitter, rolling  Has the patient had two or more falls in the past year or any fall with injury in the past year?: Yes  Receives Help From: Family  ADL Assistance: Independent  Homemaking Assistance: Independent  Ambulation Assistance: Independent  Transfer Assistance: Independent  Active : No  Patient's  Info: sister  Mode of Transportation: Car  Occupation: On disability  Type of Occupation: nurse  OBJECTIVE:     PAIN: Ge Haggis / O2: PRECAUTION / Messi Hams / Dariana Neri:   Pre Treatment: 0         Post Treatment: 0 Vitals        Oxygen    Purewick    RESTRICTIONS/PRECAUTIONS:  Restrictions/Precautions  Restrictions/Precautions: Fall Risk, Weight Bearing  Lower Extremity Weight Bearing Restrictions  Left Lower Extremity Weight Bearing: Weight Bearing As Tolerated (for transfer only)  Restrictions/Precautions: Fall Risk, Weight Bearing     MOBILITY: I Mod I S SBA CGA Min Mod Max Total  NT x2 Comments:   Bed Mobility    Rolling [] [] [] [] [] [] [] [] [] [] []    Supine to Sit [] [] [] [x] [] [] [] [] [] [] []    Scooting [] [] [] [x] [] [] [] [] [] [] []    Sit to Supine [] [] [] [] [] [] [] [] [] [] []    Transfers    Sit to Stand [] [] [] [] [x] [] [] [] [] [] [] Given cues for hand placement    Bed to Chair [] [] [] [x] [x] [] [] [] [] [] []    Stand to Sit [] [] [] [] [x] [] [] [] [] [] []     [] [] [] [] [] [] [] [] [] [] []    I=Independent, Mod I=Modified Independent, S=Supervision, SBA=Standby Assistance, CGA=Contact Guard Assistance,   Min=Minimal Assistance, Mod=Moderate Assistance, Max=Maximal Assistance, Total=Total Assistance, NT=Not Tested    BALANCE: Good Fair+ Fair Fair- Poor NT Comments   Sitting Static [x] [] [] [] [] []    Sitting Dynamic [x] [] [] [] [] []              Standing Static [] [x] [] [] [] []    Standing Dynamic [] [x] [] [] [] []      GAIT: I Mod I S SBA CGA Min Mod Max Total  NT x2 Comments:   Level of Assistance [] [] [] [] [] [] [] [] [] [] []    Distance   feet    DME N/A    Gait Quality N/A    Weightbearing Status      Stairs      I=Independent, Mod I=Modified Independent, S=Supervision, SBA=Standby Assistance, CGA=Contact Guard Assistance,   Min=Minimal Assistance, Mod=Moderate Assistance, Max=Maximal Assistance, Total=Total Assistance, NT=Not Tested    PLAN:   FREQUENCY AND DURATION: BID for duration of hospital stay or until stated goals are met, whichever comes first.    TREATMENT:   TREATMENT:   Therapeutic Activity (16 Minutes): Therapeutic activity included Supine to Sit, Scooting, Transfer Training, Ambulation on level ground, Sitting balance , and Standing balance to improve functional Activity tolerance, Balance, Coordination, Mobility, Strength, and ROM. Therapeutic Exercise (10 Minutes): Therapeutic exercises noted below to improve functional activity tolerance, strength, and mobility.      TREATMENT GRID:   Date:  10/6 Date:   Date:     Activity/Exercise Parameters Parameters Parameters   Toe curls 2x10     marching 2x10     LAQ 2x10     Leg raises 2x10                           AFTER TREATMENT PRECAUTIONS: Bed/Chair Locked, Call light within reach, Chair, Needs within reach, and RN notified    INTERDISCIPLINARY COLLABORATION:  RN/ PCT and PT/ PTA    EDUCATION:      TIME IN/OUT:  Time In: 1000  Time Out: Bony De [facility] 9415  Minutes: [address] Farrell, LEOBARDO

## 2022-10-06 NOTE — CARE COORDINATION
Insurance approval received. Pt is medically cleared for dc today and will transfer to room 402 at [facility] for [address] Transport scheduled for 1630 through [facility]. RN to call report to #[phone]. CM notified pt of the dc and transport time and she's in agreement. CM remains available to assist as needed. 10/06/22 2610   Service Assessment   Patient Orientation Alert and Oriented   Cognition Alert   History Provided By Patient;Medical Record   Primary Caregiver Self   Support Systems Family Members   Patient's Healthcare Decision Maker is: Legal Next of Florencio 68   PCP Verified by CM Yes   Last Visit to PCP Within last 3 months   Prior Functional Level Independent in ADLs/IADLs   Current Functional Level Assistance with the following:;Mobility   Can patient return to prior living arrangement No   Ability to make needs known: Good   Family able to assist with home care needs: Yes   Would you like for me to discuss the discharge plan with any other family members/significant others, and if so, who? No   Financial Resources Medicare   Social/Functional History   Lives With Alone   Type of [address] One level   [facility] to [facility] with rails   Entrance Stairs - Number of Steps 1   Entrance Stairs - [address], [address] Help From Family   ADL Assistance Independent   Homemaking Assistance Independent   Homemaking Responsibilities Yes   Ambulation Assistance Independent   Transfer Assistance Independent   Active  No   Patient's  Info sister   Mode of Transportation Car   Occupation Retired   Type of Occupation nurse   Discharge Planning   Type of [address] Prior To Admission None   [address]   DME Ordered?  No   Potential Assistance Purchasing Medications No   Type of Home Care Services None   Patient expects to be discharged to: [address] One [address]   History of falls? 1   Services At/After Discharge   Transition of Care Consult (CM Consult) SNF   Partner SNF No   Reason Why Partner SNF Not Chosen Location; Not covered by payor;Positive previous encounter   Pat [address] Discharge [facility] (SNF); Transport; In ambulance;OT;PT;Nursing services   [address] Provided? No   Mode of Transport at Discharge [facility]  ([address] [place] [address])   [facility] Time of Discharge 1630   Confirm Follow Up Transport Family   Condition of Participation: Discharge Planning   The Plan for Transition of Care is related to the following treatment goals: STR to improve pt's strength and functional abilities for a safe transition to home   The Patient and/or Patient Representative was provided with a Choice of Provider? Patient   The Patient and/Or Patient Representative agree with the Discharge Plan? Yes   Freedom of Choice list was provided with basic dialogue that supports the patient's individualized plan of care/goals, treatment preferences, and shares the quality data associated with the providers?   Yes

## 2022-10-07 ENCOUNTER — CARE COORDINATION (OUTPATIENT)
Dept: CARE COORDINATION | Facility: CLINIC | Age: 67
End: 2022-10-07

## 2022-10-07 NOTE — CARE COORDINATION
Patient discharged to [facility] on 10/6/22. SHANEKA outreach postponed for 21 days due to discharge to non-preferred network SNF.

## 2022-10-18 ENCOUNTER — OFFICE VISIT (OUTPATIENT)
Dept: ORTHOPEDIC SURGERY | Age: 67
End: 2022-10-18

## 2022-10-18 DIAGNOSIS — S82.845B: Primary | ICD-10-CM

## 2022-10-18 PROCEDURE — 99024 POSTOP FOLLOW-UP VISIT: CPT | Performed by: ORTHOPAEDIC SURGERY

## 2022-10-18 NOTE — PROGRESS NOTES
Progress Note    Patient: Glenna Barney MRN: [id number]  SSN: [id number]    YOB: 1955  Age: 75 y.o. Sex: female        10/18/2022      Subjective:     Patient is open treatment of a left bimalleolar ankle fracture. She says that she is doing remarkably well. She has been in a short leg cast    Objective: There were no vitals filed for this visit. Physical Exam:     Skin - incision is well healed with no redness or drainage  Motor and sensory function intact in LEFT LOWER extremity  Pulses palpable in LEFT LOWER extremity     XRAY FINDINGS:  No new x-rays today    Assessment:     2 weeks out from left bimalleolar ankle fracture    Plan: We will get her staples out today. We will place her in a walking boot. She can begin active and passive range of motion of the left ankle. She can shower now. She can use her boot essentially at all times for now. She can come out of this to exercise and to take a shower only. She can do weightbearing as tolerated for transfers only with her left lower extremity in the boot. I will see her back in 2 weeks and hopefully advance her activity and weightbearing at that time.   She will be about 4 weeks out at her next visit with 3 views left ankle return    Signed By: Flavio Daily MD     October 18, 2022

## 2022-10-21 ENCOUNTER — TELEPHONE (OUTPATIENT)
Dept: ORTHOPEDIC SURGERY | Age: 67
End: 2022-10-21

## 2022-10-21 DIAGNOSIS — S82.845B: Primary | ICD-10-CM

## 2022-10-21 NOTE — TELEPHONE ENCOUNTER
Can y ou call  [address] home  supply and  send them the  order  for  this pt  a bedside  commode    Ph [phone]    They are  bringing her a walker on  Monday

## 2022-10-21 NOTE — TELEPHONE ENCOUNTER
Can they get her a bedside commode? If so, please send order to whichever DME facility you prefer. Will Dr. Michelle Jones sign PT orders for 2 x week x 2 weeks, then 1 x week x 2 weeks.

## 2022-10-24 ENCOUNTER — TELEPHONE (OUTPATIENT)
Dept: ORTHOPEDIC SURGERY | Age: 67
End: 2022-10-24

## 2022-10-24 NOTE — TELEPHONE ENCOUNTER
She needs a rx for bedside commode to be sent to another place because of her insurance. [facility] is approved by her insurance so it needs to be sent to them. Their phone number is [phone].

## 2022-10-24 NOTE — TELEPHONE ENCOUNTER
Spoke with patient, I informed her that this order has been sent to [facility] Friday afternoon. I have refaxed it to the [phone]# that Raudel Rizvi gave me when I spoke with them Friday afternoon, I have also sent it to the local fax # listed on their website, fax confirmation received for all of these.

## 2022-10-28 ENCOUNTER — CARE COORDINATION (OUTPATIENT)
Dept: CARE COORDINATION | Facility: CLINIC | Age: 67
End: 2022-10-28

## 2022-10-28 NOTE — CARE COORDINATION
[facility] Care Transitions Initial Follow Up Call    Call within 2 business days of discharge: No    LPN Care Coordinator contacted the patient by telephone to perform post hospital discharge assessment. Verified name and  with patient as identifiers. Provided introduction to self, and explanation of the LPN Care Coordinator role. Patient: Yaniv Gilliland Patient : 1955   MRN: [id number]  Reason for Admission: LLE bimalleolar fx; LEFT ANKLE OPEN REDUCTION INTERNAL FIXATION  Discharge Date: 10/6/22 RARS: Readmission Risk Score: 11.7      Last Discharge [address]       Date Complaint Diagnosis Description Type Department Provider    10/1/22 Ankle Pain Nondisp bimalleol fx l low leg, init for opn fx type I/2 . .. ED to Hosp-Admission (Discharged) (ADMITTED) [id number] Denisse Waldrop MD; Clover Saldana. .. Was this an external facility discharge? Yes, [id number]  Discharge Facility: [address] [place] [address] to be reviewed by the provider   Additional needs identified to be addressed with provider: No  none               Method of communication with provider: none. Patient states she is progressing well, she has attended ortho follow up and [facility] is active for therapy. Patient having difficulty with [facility] receiving order for [facility], refaxed order on patient's behalf. LPN Care Coordinator reviewed medical action plan with patient who verbalized understanding. The patient was given an opportunity to ask questions and does not have any further questions or concerns at this time. Were discharge instructions available to patient? Yes. Reviewed appropriate site of care based on symptoms and resources available to patient including: PCP  Specialist  Home health. The patient agrees to contact the PCP office for questions related to their healthcare. Advance Care Planning:   Does patient have an Advance Directive:  decision maker verified .     Medication reconciliation was performed with patient, who verbalizes understanding of administration of home medications. Medications reviewed, 1111F entered: N/A    Was patient discharged with a pulse oximeter? no    Non-face-to-face services provided:  Obtained and reviewed discharge summary and/or continuity of care documents    Offered patient enrollment in the Remote Patient Monitoring (RPM) program for in-home monitoring: NA.    Care Transitions 24 Hour Call    Schedule Follow Up Appointment with PCP: Completed  Do you have any ongoing symptoms?: No  Were you discharged with any Home Care or Post Acute Services: Yes  Post Acute Services: Home Health (Comment: [facility]/OT)  Do you have support at home?: Alone  Care Transitions Interventions  No Identified Needs    Physical Therapy: Completed             Follow Up  Future Appointments   Date Time Provider Raciel Whitmore   11/1/2022  9:30 AM Thang Escobar MD [facility] AMB   [facility]2022 10:[facility] provided contact information. Plan for follow up call in 10 to 14 days  based on severity of symptoms and risk factors.   Plan for next call:  verify patient has received needed dme, compliance with plan of care    Yariel Peacock LPN

## 2022-11-01 ENCOUNTER — OFFICE VISIT (OUTPATIENT)
Dept: ORTHOPEDIC SURGERY | Age: 67
End: 2022-11-01

## 2022-11-01 DIAGNOSIS — S82.845B: Primary | ICD-10-CM

## 2022-11-01 PROCEDURE — 99024 POSTOP FOLLOW-UP VISIT: CPT | Performed by: ORTHOPAEDIC SURGERY

## 2022-11-01 NOTE — PROGRESS NOTES
Progress Note    Patient: Nadeem Cosby MRN: [id number]  SSN: [id number]    YOB: 1955  Age: 75 y.o. Sex: female        11/1/2022      Subjective:     Patient is now about 4 weeks out from open treatment of a left bimalleolar ankle fracture. This was treated with multiple screws. She is doing remarkably well she says she has very little pain she is walking some with her boot in place. Objective: There were no vitals filed for this visit. Physical Exam:     Skin - incision is well healed with no redness or drainage  Motor and sensory function intact in LEFT LOWER extremity  Pulses palpable in LEFT LOWER extremity     XRAY FINDINGS:  [id number] left bimalleolar ankle fracture, findings-3 views of the left ankle shows a left Byman ankle fracture shows significant evidence of healing both the lateral medial malleoli. The hardware is intact with no evidence of loosening or failure and the overall alignment is satisfactory impression is healing well aligned left bimalleolar ankle fracture    Assessment:     Healing left bimalleolar ankle fracture    Plan:     I think at this point she can sort of gradually wean herself off the walker and then gradually wean herself out of her boot.   I will see her back in about 6 weeks with 3 views left ankle return she will be about 10 weeks out at that time hopefully that will be her last visit    Signed By: Ceci Hdz MD     November 1, 2022

## 2022-11-02 ENCOUNTER — TELEPHONE (OUTPATIENT)
Dept: INTERNAL MEDICINE CLINIC | Facility: CLINIC | Age: 67
End: 2022-11-02

## 2022-11-02 ENCOUNTER — TELEPHONE (OUTPATIENT)
Dept: ENDOCRINOLOGY | Age: 67
End: 2022-11-02

## 2022-11-02 NOTE — TELEPHONE ENCOUNTER
I am calling about my appointment scheduled on Friday the 4th. It is a long story but I broke my ankle and I need to change this visit to VV if at all possible.   Please call me back to discuss

## 2022-11-02 NOTE — TELEPHONE ENCOUNTER
I called and left a message on the patient's voice mail. Her [employer] called about her recent Fx. They have no record of a DEXA. Told patient at her next visit to discuss with Dr Alli Marie and he could put in a order.

## 2022-11-02 NOTE — TELEPHONE ENCOUNTER
Patient wanted to reschedule due to not being able to come in or do virtual since she has broken her ankle. She had a Dexcom  but doesn't have anyone that can bring it to the office to download.

## 2022-11-02 NOTE — TELEPHONE ENCOUNTER
The patient is calling back she wanted your opinion on getting the Booster Covid vaccine. She got the two original injections then booster x2 what would you recommend.

## 2022-11-03 NOTE — TELEPHONE ENCOUNTER
Patient was given message below from Michelle Coburn and she expressed understanding: Recommend the \"bivalent\" vaccine with omicron coverage. Also, annual flu vaccine about 2 weeks apart.

## 2022-11-11 ENCOUNTER — CARE COORDINATION (OUTPATIENT)
Dept: CARE COORDINATION | Facility: CLINIC | Age: 67
End: 2022-11-11

## 2022-11-11 NOTE — CARE COORDINATION
Attempted outreach follow up without success, left message. Per chart review patient has attended follow up as scheduled, next visit Dr Linda Samano, ortho 12.15.22. No new needs are noted on chart review at this time. Patient will be graduated from [employer] program.  Will reopen if call is returned.

## 2022-11-30 ENCOUNTER — TELEPHONE (OUTPATIENT)
Dept: ENDOCRINOLOGY | Age: 67
End: 2022-11-30

## 2022-12-06 DIAGNOSIS — E10.40 TYPE 1 DIABETES MELLITUS WITH DIABETIC NEUROPATHY, UNSPECIFIED (HCC): Primary | ICD-10-CM

## 2022-12-06 RX ORDER — INSULIN GLARGINE 100 [IU]/ML
30 INJECTION, SOLUTION SUBCUTANEOUS NIGHTLY
Qty: 27 ML | Refills: 4 | Status: SHIPPED | OUTPATIENT
Start: 2022-12-06

## 2022-12-15 ENCOUNTER — OFFICE VISIT (OUTPATIENT)
Dept: ORTHOPEDIC SURGERY | Age: 67
End: 2022-12-15

## 2022-12-15 DIAGNOSIS — S82.845B: Primary | ICD-10-CM

## 2022-12-15 PROCEDURE — 99024 POSTOP FOLLOW-UP VISIT: CPT | Performed by: ORTHOPAEDIC SURGERY

## 2022-12-15 NOTE — PROGRESS NOTES
Progress Note    Patient: Prema Borden MRN: [id number]  SSN: [id number]    YOB: 1955  Age: 75 y.o. Sex: female        12/15/2022      Subjective:     Patient is now about 10 weeks out from open treatment of a left bimalleolar ankle fracture. To treat this with limited incisions because of her history of diabetes. She is really doing remarkably well now. Only thing she is worried about is a little bit of some skin breakdown over 2 points of her left foot but she really seems that she is doing well    Objective: There were no vitals filed for this visit. Physical Exam:     Skin - incision is well healed with no redness or drainage, does have just the beginnings of a pressure sore on the plantar surface of her foot over her great toe and metatarsal head. She also has a very small wound on the lateral anterolateral aspect of her left leg but is very minimal  Motor and sensory function intact in LEFT LOWER extremity  Pulses palpable in LEFT LOWER extremity     XRAY FINDINGS:  [id number] left bimalleolar ankle fracture, findings-3 views of the left ankle shows the left Byman ankle fracture is healed healed in very reasonable position. Ankle mortise is well aligned and the hardware shows no evidence of loosening or failure there is pretty solid healing at her primary fracture lines impression-healed well aligned left bimalleolar ankle fracture    Assessment:     Left bimalleolar ankle fracture    Plan:     I think at this point we can try some metatarsal pads and we have shown her some we think would help with getting out of her boot and back into her diabetic shoe with a custom molded shoe insert would also help with this. I did encourage her if this starts to breakdown anymore at all to let us know immediately we might do something differently.   She seems to come for this plan I think she can follow-up on as-needed basis    Signed By: Keegan Sharif MD     December 15, 2022

## 2023-01-11 DIAGNOSIS — E55.9 VITAMIN D DEFICIENCY, UNSPECIFIED: ICD-10-CM

## 2023-01-11 DIAGNOSIS — I10 ESSENTIAL HYPERTENSION: ICD-10-CM

## 2023-01-11 DIAGNOSIS — E10.42 TYPE 1 DIABETES MELLITUS WITH DIABETIC POLYNEUROPATHY (HCC): Primary | ICD-10-CM

## 2023-01-17 ENCOUNTER — NURSE ONLY (OUTPATIENT)
Dept: INTERNAL MEDICINE CLINIC | Facility: CLINIC | Age: 68
End: 2023-01-17

## 2023-01-17 ENCOUNTER — OFFICE VISIT (OUTPATIENT)
Dept: INTERNAL MEDICINE CLINIC | Facility: CLINIC | Age: 68
End: 2023-01-17
Payer: MEDICARE

## 2023-01-17 VITALS
WEIGHT: 160 LBS | HEART RATE: 86 BPM | HEIGHT: 68 IN | DIASTOLIC BLOOD PRESSURE: 80 MMHG | SYSTOLIC BLOOD PRESSURE: 140 MMHG | BODY MASS INDEX: 24.25 KG/M2 | OXYGEN SATURATION: 98 %

## 2023-01-17 DIAGNOSIS — N30.00 ACUTE CYSTITIS WITHOUT HEMATURIA: ICD-10-CM

## 2023-01-17 DIAGNOSIS — E55.9 VITAMIN D DEFICIENCY, UNSPECIFIED: ICD-10-CM

## 2023-01-17 DIAGNOSIS — R30.9 URINARY PAIN: Primary | ICD-10-CM

## 2023-01-17 DIAGNOSIS — E10.42 TYPE 1 DIABETES MELLITUS WITH DIABETIC POLYNEUROPATHY (HCC): ICD-10-CM

## 2023-01-17 DIAGNOSIS — I10 ESSENTIAL HYPERTENSION: ICD-10-CM

## 2023-01-17 DIAGNOSIS — N18.30 STAGE 3 CHRONIC KIDNEY DISEASE, UNSPECIFIED WHETHER STAGE 3A OR 3B CKD (HCC): ICD-10-CM

## 2023-01-17 LAB
25(OH)D3 SERPL-MCNC: 52.9 NG/ML (ref 30–100)
ALBUMIN SERPL-MCNC: 4.1 G/DL (ref 3.2–4.6)
ALBUMIN/GLOB SERPL: 1.2 (ref 0.4–1.6)
ALP SERPL-CCNC: 89 U/L (ref 50–136)
ALT SERPL-CCNC: 21 U/L (ref 12–65)
ANION GAP SERPL CALC-SCNC: 10 MMOL/L (ref 2–11)
AST SERPL-CCNC: 20 U/L (ref 15–37)
BILIRUB SERPL-MCNC: 0.5 MG/DL (ref 0.2–1.1)
BILIRUBIN, URINE, POC: NEGATIVE
BLOOD URINE, POC: NEGATIVE
BUN SERPL-MCNC: 13 MG/DL (ref 8–23)
CALCIUM SERPL-MCNC: 10.3 MG/DL (ref 8.3–10.4)
CHLORIDE SERPL-SCNC: 103 MMOL/L (ref 101–110)
CO2 SERPL-SCNC: 25 MMOL/L (ref 21–32)
CREAT SERPL-MCNC: 0.6 MG/DL (ref 0.6–1)
CREAT UR-MCNC: 15 MG/DL
ERYTHROCYTE [DISTWIDTH] IN BLOOD BY AUTOMATED COUNT: 12.7 % (ref 11.9–14.6)
EST. AVERAGE GLUCOSE BLD GHB EST-MCNC: 128 MG/DL
GLOBULIN SER CALC-MCNC: 3.3 G/DL (ref 2.8–4.5)
GLUCOSE SERPL-MCNC: 124 MG/DL (ref 65–100)
GLUCOSE URINE, POC: NEGATIVE
HBA1C MFR BLD: 6.1 % (ref 4.8–5.6)
HCT VFR BLD AUTO: 42.4 % (ref 35.8–46.3)
HGB BLD-MCNC: 13.5 G/DL (ref 11.7–15.4)
KETONES, URINE, POC: NEGATIVE
LEUKOCYTE ESTERASE, URINE, POC: NEGATIVE
MCH RBC QN AUTO: 30.9 PG (ref 26.1–32.9)
MCHC RBC AUTO-ENTMCNC: 31.8 G/DL (ref 31.4–35)
MCV RBC AUTO: 97 FL (ref 82–102)
MICROALBUMIN UR-MCNC: 3.84 MG/DL (ref 0–3)
MICROALBUMIN/CREAT UR-RTO: 256 MG/G (ref 0–30)
NITRITE, URINE, POC: NEGATIVE
NRBC # BLD: 0 K/UL (ref 0–0.2)
PH, URINE, POC: 6 (ref 4.6–8)
PLATELET # BLD AUTO: 365 K/UL (ref 150–450)
PMV BLD AUTO: 9.7 FL (ref 9.4–12.3)
POTASSIUM SERPL-SCNC: 4.5 MMOL/L (ref 3.5–5.1)
PROT SERPL-MCNC: 7.4 G/DL (ref 6.3–8.2)
PROTEIN,URINE, POC: NEGATIVE
RBC # BLD AUTO: 4.37 M/UL (ref 4.05–5.2)
SODIUM SERPL-SCNC: 138 MMOL/L (ref 133–143)
SPECIFIC GRAVITY, URINE, POC: 1 (ref 1–1.03)
URINALYSIS CLARITY, POC: CLEAR
URINALYSIS COLOR, POC: NORMAL
UROBILINOGEN, POC: NORMAL
WBC # BLD AUTO: 6.9 K/UL (ref 4.3–11.1)

## 2023-01-17 PROCEDURE — 3079F DIAST BP 80-89 MM HG: CPT | Performed by: NURSE PRACTITIONER

## 2023-01-17 PROCEDURE — 3077F SYST BP >= 140 MM HG: CPT | Performed by: NURSE PRACTITIONER

## 2023-01-17 PROCEDURE — 99213 OFFICE O/P EST LOW 20 MIN: CPT | Performed by: NURSE PRACTITIONER

## 2023-01-17 PROCEDURE — 1123F ACP DISCUSS/DSCN MKR DOCD: CPT | Performed by: NURSE PRACTITIONER

## 2023-01-17 PROCEDURE — 81002 URINALYSIS NONAUTO W/O SCOPE: CPT | Performed by: NURSE PRACTITIONER

## 2023-01-17 RX ORDER — AMOXICILLIN 500 MG/1
500 CAPSULE ORAL 3 TIMES DAILY
Qty: 21 CAPSULE | Refills: 0 | Status: SHIPPED | OUTPATIENT
Start: 2023-01-17 | End: 2023-01-24

## 2023-01-17 SDOH — ECONOMIC STABILITY: FOOD INSECURITY: WITHIN THE PAST 12 MONTHS, THE FOOD YOU BOUGHT JUST DIDN'T LAST AND YOU DIDN'T HAVE MONEY TO GET MORE.: NEVER TRUE

## 2023-01-17 SDOH — ECONOMIC STABILITY: FOOD INSECURITY: WITHIN THE PAST 12 MONTHS, YOU WORRIED THAT YOUR FOOD WOULD RUN OUT BEFORE YOU GOT MONEY TO BUY MORE.: NEVER TRUE

## 2023-01-17 ASSESSMENT — PATIENT HEALTH QUESTIONNAIRE - PHQ9
2. FEELING DOWN, DEPRESSED OR HOPELESS: 0
1. LITTLE INTEREST OR PLEASURE IN DOING THINGS: 0
SUM OF ALL RESPONSES TO PHQ QUESTIONS 1-9: 0
SUM OF ALL RESPONSES TO PHQ9 QUESTIONS 1 & 2: 0
SUM OF ALL RESPONSES TO PHQ QUESTIONS 1-9: 0

## 2023-01-17 ASSESSMENT — ENCOUNTER SYMPTOMS
VOMITING: 0
NAUSEA: 1

## 2023-01-17 ASSESSMENT — SOCIAL DETERMINANTS OF HEALTH (SDOH): HOW HARD IS IT FOR YOU TO PAY FOR THE VERY BASICS LIKE FOOD, HOUSING, MEDICAL CARE, AND HEATING?: NOT VERY HARD

## 2023-01-17 NOTE — PROGRESS NOTES
Aishwarya Rhodes (:  1955) is a 75 y.o. female,Established patient, here for evaluation of the following chief complaint(s):  Urinary Pain (Back hurts her ), Nausea, and Fatigue         ASSESSMENT/PLAN:  1. Urinary pain  -     AMB POC URINALYSIS DIP STICK MANUAL W/O MICRO  -     Culture, Urine; Future  2. Acute cystitis without hematuria  3. Type 1 diabetes mellitus with diabetic polyneuropathy (HCC)  4. Stage 3 chronic kidney disease, unspecified whether stage 3a or 3b CKD (HCC)      No follow-ups on file. Patient with UTI, urine dip negative but last dip was negative and cultured positive  Send culture  Treat per last urine culture as her symptoms are similar  Hydrate well  Avoid bladder irritants  Monitor glucose, has greater glucose fluctuation when sick      Subjective   SUBJECTIVE/OBJECTIVE:  Urinary Pain   This is a recurrent problem. The current episode started in the past 7 days. The problem has been unchanged. The quality of the pain is described as burning and aching. The pain is severe. There has been no fever. Associated symptoms include flank pain, frequency, nausea and urgency. Pertinent negatives include no chills, discharge, hematuria, hesitancy, sweats or vomiting. Associated symptoms comments: Lower abdomen pressure  . She has tried nothing for the symptoms. Her past medical history is significant for recurrent UTIs (feels the same as 6 months ago). Fatigue  Associated symptoms include fatigue and nausea. Pertinent negatives include no chills or vomiting. Review of Systems   Constitutional:  Positive for fatigue. Negative for chills. Gastrointestinal:  Positive for nausea. Negative for vomiting. Genitourinary:  Positive for flank pain, frequency and urgency. Negative for hematuria and hesitancy. Objective   Physical Exam  HENT:      Head: Normocephalic.       Right Ear: External ear normal.      Left Ear: External ear normal.   Eyes:      Pupils: Pupils are equal, round, and reactive to light. Cardiovascular:      Rate and Rhythm: Normal rate and regular rhythm. Pulmonary:      Effort: Pulmonary effort is normal.      Breath sounds: No wheezing or rhonchi. Abdominal:      Tenderness: There is abdominal tenderness (suprapubc). Musculoskeletal:      Cervical back: Neck supple. Neurological:      General: No focal deficit present. Mental Status: She is alert and oriented to person, place, and time. Psychiatric:         Mood and Affect: Mood normal.         Behavior: Behavior normal.                An electronic signature was used to authenticate this note.     --LON Juan - CNP

## 2023-01-20 LAB
BACTERIA SPEC CULT: NORMAL
SERVICE CMNT-IMP: NORMAL

## 2023-01-24 ENCOUNTER — OFFICE VISIT (OUTPATIENT)
Dept: INTERNAL MEDICINE CLINIC | Facility: CLINIC | Age: 68
End: 2023-01-24
Payer: MEDICARE

## 2023-01-24 VITALS
SYSTOLIC BLOOD PRESSURE: 120 MMHG | HEART RATE: 82 BPM | WEIGHT: 160 LBS | DIASTOLIC BLOOD PRESSURE: 60 MMHG | BODY MASS INDEX: 24.25 KG/M2 | OXYGEN SATURATION: 96 % | HEIGHT: 68 IN

## 2023-01-24 DIAGNOSIS — E10.42 DIABETIC POLYNEUROPATHY ASSOCIATED WITH TYPE 1 DIABETES MELLITUS (HCC): ICD-10-CM

## 2023-01-24 DIAGNOSIS — L84 PRE-ULCERATIVE CORN OR CALLOUS: Primary | ICD-10-CM

## 2023-01-24 DIAGNOSIS — R80.9 CONTROLLED TYPE 1 DIABETES MELLITUS WITH MICROALBUMINURIA (HCC): ICD-10-CM

## 2023-01-24 DIAGNOSIS — E03.9 ACQUIRED HYPOTHYROIDISM: ICD-10-CM

## 2023-01-24 DIAGNOSIS — N18.30 STAGE 3 CHRONIC KIDNEY DISEASE, UNSPECIFIED WHETHER STAGE 3A OR 3B CKD (HCC): ICD-10-CM

## 2023-01-24 DIAGNOSIS — S82.845B: ICD-10-CM

## 2023-01-24 DIAGNOSIS — E10.29 CONTROLLED TYPE 1 DIABETES MELLITUS WITH MICROALBUMINURIA (HCC): ICD-10-CM

## 2023-01-24 DIAGNOSIS — E10.42 TYPE 1 DIABETES MELLITUS WITH DIABETIC POLYNEUROPATHY (HCC): ICD-10-CM

## 2023-01-24 DIAGNOSIS — G89.4 CHRONIC PAIN SYNDROME: ICD-10-CM

## 2023-01-24 PROCEDURE — 99214 OFFICE O/P EST MOD 30 MIN: CPT | Performed by: INTERNAL MEDICINE

## 2023-01-24 PROCEDURE — 3074F SYST BP LT 130 MM HG: CPT | Performed by: INTERNAL MEDICINE

## 2023-01-24 PROCEDURE — 3078F DIAST BP <80 MM HG: CPT | Performed by: INTERNAL MEDICINE

## 2023-01-24 PROCEDURE — 1123F ACP DISCUSS/DSCN MKR DOCD: CPT | Performed by: INTERNAL MEDICINE

## 2023-01-24 PROCEDURE — 3044F HG A1C LEVEL LT 7.0%: CPT | Performed by: INTERNAL MEDICINE

## 2023-01-24 RX ORDER — PEN NEEDLE, DIABETIC 31 GX5/16"
NEEDLE, DISPOSABLE MISCELLANEOUS
Qty: 200 EACH | Refills: 3 | Status: SHIPPED | OUTPATIENT
Start: 2023-01-24

## 2023-01-24 RX ORDER — VALSARTAN 320 MG/1
320 TABLET ORAL DAILY
Qty: 30 TABLET | Refills: 5 | Status: SHIPPED | OUTPATIENT
Start: 2023-01-24

## 2023-01-24 RX ORDER — LIFITEGRAST 50 MG/ML
SOLUTION/ DROPS OPHTHALMIC 2 TIMES DAILY
COMMUNITY

## 2023-01-24 RX ORDER — HYDROCODONE BITARTRATE AND ACETAMINOPHEN 7.5; 325 MG/1; MG/1
1 TABLET ORAL EVERY 6 HOURS PRN
Qty: 120 TABLET | Refills: 0 | Status: SHIPPED | OUTPATIENT
Start: 2023-01-24 | End: 2023-01-26

## 2023-01-24 RX ORDER — LEVOTHYROXINE SODIUM 112 UG/1
112 TABLET ORAL
Qty: 90 TABLET | Refills: 3 | Status: SHIPPED | OUTPATIENT
Start: 2023-01-24

## 2023-01-24 RX ORDER — LOSARTAN POTASSIUM 100 MG/1
100 TABLET ORAL DAILY
Qty: 90 TABLET | Refills: 3 | Status: CANCELLED | OUTPATIENT
Start: 2023-01-24

## 2023-01-24 ASSESSMENT — PATIENT HEALTH QUESTIONNAIRE - PHQ9
1. LITTLE INTEREST OR PLEASURE IN DOING THINGS: 0
SUM OF ALL RESPONSES TO PHQ QUESTIONS 1-9: 0
9. THOUGHTS THAT YOU WOULD BE BETTER OFF DEAD, OR OF HURTING YOURSELF: 0
SUM OF ALL RESPONSES TO PHQ QUESTIONS 1-9: 0
SUM OF ALL RESPONSES TO PHQ QUESTIONS 1-9: 0
SUM OF ALL RESPONSES TO PHQ9 QUESTIONS 1 & 2: 0
6. FEELING BAD ABOUT YOURSELF - OR THAT YOU ARE A FAILURE OR HAVE LET YOURSELF OR YOUR FAMILY DOWN: 0
8. MOVING OR SPEAKING SO SLOWLY THAT OTHER PEOPLE COULD HAVE NOTICED. OR THE OPPOSITE, BEING SO FIGETY OR RESTLESS THAT YOU HAVE BEEN MOVING AROUND A LOT MORE THAN USUAL: 0
2. FEELING DOWN, DEPRESSED OR HOPELESS: 0
SUM OF ALL RESPONSES TO PHQ QUESTIONS 1-9: 0
5. POOR APPETITE OR OVEREATING: 0
7. TROUBLE CONCENTRATING ON THINGS, SUCH AS READING THE NEWSPAPER OR WATCHING TELEVISION: 0
4. FEELING TIRED OR HAVING LITTLE ENERGY: 0
3. TROUBLE FALLING OR STAYING ASLEEP: 0

## 2023-01-24 ASSESSMENT — ENCOUNTER SYMPTOMS
WHEEZING: 0
SINUS PAIN: 0
ABDOMINAL PAIN: 0
SHORTNESS OF BREATH: 0
CONSTIPATION: 0
DIARRHEA: 0
NAUSEA: 0
VOMITING: 0

## 2023-01-24 NOTE — PROGRESS NOTES
Jackie Dixon was seen today for follow-up, medication refill and diabetes. Diagnoses and all orders for this visit:    Pre-ulcerative corn or callous    Type 1 diabetes mellitus with diabetic polyneuropathy (HCC)  -     B-D ULTRAFINE III SHORT PEN 31G X 8 MM MISC; USE 1 PEN NEEDLE TO INJECT INSULIN FIVE TIMES DAILY  DX E10.42  NPI [id number]  -     valsartan (DIOVAN) 320 MG tablet; Take 1 tablet by mouth daily 1 daily to replace losartan  -     Hemoglobin A1C; Future  -     Comprehensive Metabolic Panel; Future  -     Microalbumin / Creatinine Urine Ratio; Future    Stage 3 chronic kidney disease, unspecified whether stage 3a or 3b CKD (HCC)    Controlled type 1 diabetes mellitus with microalbuminuria (HCC)    Nondisp bimalleol fx l low leg, init for opn fx type I/2  -     Discontinue: HYDROcodone-acetaminophen (NORCO) 7.5-325 MG per tablet; Take 1 tablet by mouth every 6 hours as needed for Pain for up to 30 days. Max Daily Amount: 4 tablets    Acquired hypothyroidism  -     levothyroxine (SYNTHROID) 112 MCG tablet; Take 1 tablet by mouth every morning (before breakfast)    Diabetic polyneuropathy associated with type 1 diabetes mellitus (HCC)  -     Discontinue: HYDROcodone-acetaminophen (NORCO) 7.5-325 MG per tablet; Take 1 tablet by mouth every 6 hours as needed for Pain for up to 30 days. Max Daily Amount: 4 tablets  -     HYDROcodone-acetaminophen (NORCO) 7.5-325 MG per tablet; Take 1 tablet by mouth every 6 hours as needed for Pain for up to 30 days. Max Daily Amount: 4 tablets    Chronic pain syndrome  -     Discontinue: HYDROcodone-acetaminophen (NORCO) 7.5-325 MG per tablet; Take 1 tablet by mouth every 6 hours as needed for Pain for up to 30 days. Max Daily Amount: 4 tablets  -     HYDROcodone-acetaminophen (NORCO) 7.5-325 MG per tablet; Take 1 tablet by mouth every 6 hours as needed for Pain for up to 30 days. Max Daily Amount: 4 tablets      Return in about 4 months (around 5/24/2023) for labs before visit. Kimberly Gomez is a 75 y.o. female    Chief Complaint   Patient presents with    Follow-up     CHECK UP AND REVIEW LABS BMP,THYROID    Medication Refill     WANTS REFILL ON [address]    Diabetes     DM foot exam for shoes and complete form   [place] severe pain  Denies abuse or misuse. pmpm reviewed  [place] affective  Dm-stable  Lab Results   Component Value Date    LABA1C 6.1 (H) 01/17/2023    LABA1C 6.2 06/24/2022    LABA1C 6.6 (H) 12/21/2021     Lab Results   Component Value Date    LABMICR 3.84 (H) 01/17/2023    LDLCALC 118.8 (H) 10/02/2022    CREATININE 0.60 01/17/2023     Wt Readings from Last 3 Encounters:   01/24/23 160 lb (72.6 kg)   01/17/23 160 lb (72.6 kg)   10/05/22 163 lb 6.4 oz (74.1 kg)         Office Visit on 01/17/2023   Component Date Value Ref Range Status    Color (UA POC) 01/17/2023 Light Yellow   Final    Clarity (UA POC) 01/17/2023 Clear   Final    Glucose, Urine, POC 01/17/2023 Negative  Negative Final    Bilirubin, Urine, POC 01/17/2023 Negative  Negative Final    Ketones, Urine, POC 01/17/2023 Negative  Negative Final    Specific Gravity, Urine, POC 01/17/2023 1.005  1.001 - 1.035 Final    Blood (UA POC) 01/17/2023 Negative  Negative Final    pH, Urine, POC 01/17/2023 6.0  4.6 - 8.0 Final    Protein, Urine, POC 01/17/2023 Negative  Negative Final    Urobilinogen, POC 01/17/2023 0.2 mg/dL   Final    Nitrite, Urine, POC 01/17/2023 Negative  Negative Final    Leukocyte Esterase, Urine, POC 01/17/2023 Negative  Negative Final    Special Requests 01/17/2023 NO SPECIAL REQUESTS    Final    Culture 01/17/2023 10,000 to 50,000 COLONIES/mL MIXED SKIN AGUSTIN ISOLATED    Final        Past Medical History:   Diagnosis Date    Benign paroxysmal positional vertigo     Breast cancer ([facility] 12/2010    Cancer ([facility] 2010    lt breast    Cancer ([facility] [address] [facility] 1988    colon    Chronic pain     Chronic renal disease, stage III [facility]) [[id number]] 6/29/2022    Diabetic polyneuropathy associated with type 1 diabetes mellitus ([facility] 1/13/2020    Gastroparesis     GERD (gastroesophageal reflux disease)     medicaton controlled    Glaucoma     Hiatal hernia     HLD (hyperlipidemia)     Hypertension     medication controlled    Neuropathy, diabetic ([facility]     Osteoporosis, unspecified     Personal history of malignant neoplasm of breast     Retinopathy due to secondary diabetes [facility])     Thyroid disease     medication controlled    Type 1 diabetes ([facility] 5years of age    insulin, Hemoglobin A1c 6.0 on 7/25/18. Unspecified hereditary and idiopathic peripheral neuropathy     Unspecified hypothyroidism        Family History   Problem Relation Age of Onset    Heart Disease Father     Diabetes Father         Type II    Heart Attack Father 48    Atrial Fibrillation Mother     No Known Problems Sister     Thyroid Disease Sister         multinodular goiter    Gout Brother     Colon Cancer Paternal Grandmother     Breast Cancer Mother         Social History     Socioeconomic History    Marital status:       Spouse name: Not on file    Number of children: Not on file    Years of education: Not on file    Highest education level: Not on file   Occupational History    Not on file   Tobacco Use    Smoking status: Never    Smokeless tobacco: Never   Substance and Sexual Activity    Alcohol use: No    Drug use: Yes     Types: OTC, Prescription    Sexual activity: Not on file   Other Topics Concern    Not on file   Social History Narrative    Not on file     Social Determinants of Health     Financial Resource Strain: Low Risk     Difficulty of Paying Living Expenses: Not very hard   Food Insecurity: No Food Insecurity    Worried About Running Out of Food in the Last Year: Never true    Ran Out of Food in the Last Year: Never true   Transportation Needs: Not on file   Physical Activity: Inactive    Days of Exercise per Week: 0 days    Minutes of Exercise per Session: 0 min   Stress: Not on file   Social Connections: Not on file   Intimate Partner Violence: Not on file   Housing Stability: Not on file         Current Outpatient Medications:     HYDROcodone-acetaminophen (NORCO) 7.5-325 MG per tablet, Take 1 tablet by mouth every 6 hours as needed for Pain for up to 30 days. Max Daily Amount: 4 tablets, Disp: 120 tablet, Rfl: 0    Lifitegrast (XIIDRA) 5 % SOLN, Apply to eye in the morning and at bedtime EYE DR RX, Disp: , Rfl:     levothyroxine (SYNTHROID) 112 MCG tablet, Take 1 tablet by mouth every morning (before breakfast), Disp: 90 tablet, Rfl: 3    B-D ULTRAFINE III SHORT PEN 31G X 8 MM MISC, USE 1 PEN NEEDLE TO INJECT INSULIN FIVE TIMES DAILY  DX E10.42  NPI [id number], Disp: 200 each, Rfl: 3    valsartan (DIOVAN) 320 MG tablet, Take 1 tablet by mouth daily 1 daily to replace losartan, Disp: 30 tablet, Rfl: 5    LANTUS SOLOSTAR 100 UNIT/ML injection pen, Inject 30 Units into the skin nightly, Disp: 27 mL, Rfl: 4    HUMALOG KWIKPEN 100 UNIT/ML SOPN, Inject into the skin 2 units before meals plus correction scale 2/50>200, max daily dose 30 units, Disp: 30 mL, Rfl: 3    Methylcobalamin (METHYL B-12 PO), Take 1,000 mcg by mouth daily. , Disp: , Rfl:     Contact Lens Care Products (THERATEARS) MISC, Take 1 tablet by mouth 3 times daily, Disp: , Rfl:     hydrocortisone 2.5 % cream, APPLY TOPICALLY TO FACE TWICE DAILY AS NEEDED FOR ITCHING, Disp: , Rfl:     TURMERIC PO, Take 700 mg by mouth daily, Disp: , Rfl:     Alpha-Lipoic Acid 600 MG CAPS, Take 1 tablet by mouth 2 times daily, Disp: , Rfl:     Calcium Carbonate-Vitamin D (CALCIUM-VITAMIN D) 600-125 MG-UNIT TABS, Take 1 tablet by mouth 2 times daily, Disp: , Rfl:     Glucagon (Molly Rotunda 2-PACK) 1 MG/0.2ML SOAJ, Inject 1 mg into the skin as needed, Disp: , Rfl:     loratadine (CLARITIN) 10 MG tablet, Take 10 mg by mouth daily as needed, Disp: , Rfl:     losartan (COZAAR) 100 MG tablet, Take 100 mg by mouth daily, Disp: , Rfl:     Lutein-Zeaxanthin 20-1 MG CAPS, Take 25 mg by mouth daily, Disp: , Rfl: nystatin (MYCOSTATIN) 081982 UNIT/GM powder, Apply topically 4 times daily, Disp: , Rfl:     ondansetron (ZOFRAN-ODT) 8 MG TBDP disintegrating tablet, Take 8 mg by mouth every 8 hours as needed for Nausea or Vomiting, Disp: , Rfl:     pantoprazole (PROTONIX) 40 MG tablet, Take 40 mg by mouth daily, Disp: , Rfl:     senna-docusate (PERICOLACE) 8.6-50 MG per tablet, Take 1 tablet by mouth daily as needed, Disp: , Rfl:     Travoprost, BAK Free, (TRAVATAN Z) 0.004 % SOLN ophthalmic solution, Place 1 drop into both eyes nightly, Disp: , Rfl:     Allergies   Allergen Reactions    Atorvastatin Myalgia    Ketoconazole Itching     2% Shampoo-     Lisinopril Other (See Comments)    Mirabegron Hives    Buspirone Palpitations         Review of Systems   Constitutional:  Negative for appetite change, chills, fatigue and fever. HENT:  Negative for sinus pain. Respiratory:  Negative for shortness of breath and wheezing. Cardiovascular:  Negative for chest pain. Gastrointestinal:  Negative for abdominal pain, constipation, diarrhea, nausea and vomiting. Genitourinary:  Negative for dysuria and frequency. Musculoskeletal:  Negative for myalgias. Neurological:  Negative for dizziness. Psychiatric/Behavioral:  Negative for dysphoric mood and suicidal ideas. All other systems reviewed and are negative. Vitals:    01/24/23 1259 01/24/23 1309   BP: (!) 150/60 120/60   Pulse: 82    SpO2: 96%    Weight: 160 lb (72.6 kg)    Height: 5' 8\" (1.727 m)            Physical Exam  Eyes:      Extraocular Movements: Extraocular movements intact. Cardiovascular:      Rate and Rhythm: Normal rate and regular rhythm. Pulmonary:      Effort: Pulmonary effort is normal.   Musculoskeletal:        Feet:    Skin:     Coloration: Skin is not jaundiced. Neurological:      General: No focal deficit present. Mental Status: She is alert. Psychiatric:         Mood and Affect: Mood normal.         Thought Content:  Thought content normal.          Royal Lucas was seen today for follow-up, medication refill and diabetes. Diagnoses and all orders for this visit:    Pre-ulcerative corn or callous    Type 1 diabetes mellitus with diabetic polyneuropathy (HCC)  -     B-D ULTRAFINE III SHORT PEN 31G X 8 MM MISC; USE 1 PEN NEEDLE TO INJECT INSULIN FIVE TIMES DAILY  DX E10.42  NPI [id number]  -     valsartan (DIOVAN) 320 MG tablet; Take 1 tablet by mouth daily 1 daily to replace losartan  -     Hemoglobin A1C; Future  -     Comprehensive Metabolic Panel; Future  -     Microalbumin / Creatinine Urine Ratio; Future    Stage 3 chronic kidney disease, unspecified whether stage 3a or 3b CKD (HCC)    Controlled type 1 diabetes mellitus with microalbuminuria (HCC)    Nondisp bimalleol fx l low leg, init for opn fx type I/2  -     Discontinue: HYDROcodone-acetaminophen (NORCO) 7.5-325 MG per tablet; Take 1 tablet by mouth every 6 hours as needed for Pain for up to 30 days. Max Daily Amount: 4 tablets    Acquired hypothyroidism  -     levothyroxine (SYNTHROID) 112 MCG tablet; Take 1 tablet by mouth every morning (before breakfast)    Diabetic polyneuropathy associated with type 1 diabetes mellitus (HCC)  -     Discontinue: HYDROcodone-acetaminophen (NORCO) 7.5-325 MG per tablet; Take 1 tablet by mouth every 6 hours as needed for Pain for up to 30 days. Max Daily Amount: 4 tablets  -     HYDROcodone-acetaminophen (NORCO) 7.5-325 MG per tablet; Take 1 tablet by mouth every 6 hours as needed for Pain for up to 30 days. Max Daily Amount: 4 tablets    Chronic pain syndrome  -     Discontinue: HYDROcodone-acetaminophen (NORCO) 7.5-325 MG per tablet; Take 1 tablet by mouth every 6 hours as needed for Pain for up to 30 days. Max Daily Amount: 4 tablets  -     HYDROcodone-acetaminophen (NORCO) 7.5-325 MG per tablet; Take 1 tablet by mouth every 6 hours as needed for Pain for up to 30 days.  Max Daily Amount: 4 tablets               Madonna Speaker, DO

## 2023-01-26 RX ORDER — HYDROCODONE BITARTRATE AND ACETAMINOPHEN 7.5; 325 MG/1; MG/1
1 TABLET ORAL EVERY 6 HOURS PRN
Qty: 120 TABLET | Refills: 0 | Status: SHIPPED | OUTPATIENT
Start: 2023-01-26 | End: 2023-02-25

## 2023-02-28 ENCOUNTER — OFFICE VISIT (OUTPATIENT)
Dept: ENDOCRINOLOGY | Age: 68
End: 2023-02-28
Payer: MEDICARE

## 2023-02-28 ENCOUNTER — TELEPHONE (OUTPATIENT)
Dept: ENDOCRINOLOGY | Age: 68
End: 2023-02-28

## 2023-02-28 VITALS
WEIGHT: 161 LBS | SYSTOLIC BLOOD PRESSURE: 165 MMHG | DIASTOLIC BLOOD PRESSURE: 75 MMHG | BODY MASS INDEX: 24.48 KG/M2 | HEART RATE: 121 BPM | OXYGEN SATURATION: 96 %

## 2023-02-28 DIAGNOSIS — E10.40 TYPE 1 DIABETES MELLITUS WITH DIABETIC NEUROPATHY, UNSPECIFIED (HCC): ICD-10-CM

## 2023-02-28 DIAGNOSIS — E89.0 POST-SURGICAL HYPOTHYROIDISM: ICD-10-CM

## 2023-02-28 DIAGNOSIS — Z86.39 HISTORY OF GRAVES' DISEASE: ICD-10-CM

## 2023-02-28 DIAGNOSIS — E10.65 TYPE 1 DIABETES MELLITUS WITH HYPERGLYCEMIA (HCC): Primary | ICD-10-CM

## 2023-02-28 PROCEDURE — 3078F DIAST BP <80 MM HG: CPT | Performed by: PHYSICIAN ASSISTANT

## 2023-02-28 PROCEDURE — 3044F HG A1C LEVEL LT 7.0%: CPT | Performed by: PHYSICIAN ASSISTANT

## 2023-02-28 PROCEDURE — 99214 OFFICE O/P EST MOD 30 MIN: CPT | Performed by: PHYSICIAN ASSISTANT

## 2023-02-28 PROCEDURE — 1123F ACP DISCUSS/DSCN MKR DOCD: CPT | Performed by: PHYSICIAN ASSISTANT

## 2023-02-28 PROCEDURE — 95251 CONT GLUC MNTR ANALYSIS I&R: CPT | Performed by: PHYSICIAN ASSISTANT

## 2023-02-28 PROCEDURE — 3077F SYST BP >= 140 MM HG: CPT | Performed by: PHYSICIAN ASSISTANT

## 2023-02-28 RX ORDER — FLUCONAZOLE 150 MG/1
150 TABLET ORAL
Qty: 2 TABLET | Refills: 0 | Status: SHIPPED | OUTPATIENT
Start: 2023-02-28 | End: 2023-03-06

## 2023-02-28 NOTE — TELEPHONE ENCOUNTER
Patient came in for her Office visit with Celine Carter today, 2/28/23. Patient left before we could give her [facility] Patient Assistance for Lantus &Humalog. Mailing the packets to her to fill out.

## 2023-02-28 NOTE — PROGRESS NOTES
[facility]    Meg Mansfield PA-C  [facility]  [address], [address]  [place]  Phone [phone]  Facsimile [phone]          Tree Prado is a 75 y.o. female seen 2/28/2023 for follow up evaluation of of type 1 diabetes on MDI with CGM            Assessment and Plan:      In office COVID-19 PPE worn and precautions taken            Interpretation of 72 hour glucose monitor: At least 72 hours of data were reviewed. The patient utilizes a dexcom gG8 continuous glucose monitoring system. The average glucose during the reviewed timeframe was 126 with a standard deviation of 41. There is a pattern of frequent overnight and intermittant postprandial hypoglycemia. 1. Type 1 diabetes mellitus with hyperglycemia ([address]    Pt with type 1 diabetes whose A1c is at goal while taking a higher dose of basal insulin despite the fact that we have encouraged her to down titrate this from 28 units to at least 26 units, she is taking 30 units. She reports that she is regularly using her  2 units of prandial insulin. I have encouraged her to consider down titrating her basal insulin and uptitrating her prandial insulin for more balanced treatment regimen however she reports that when she down titrated her basal insulin in the past her  A1c increased and she is not willing to do this at this time. She believes her low A1c is an indicator of her diabetic health. We reviewed Time in Range goals. We again reviewed risks associated with hypoglycemia including death. She again declines a prescription for glucagon     she will continue to monitor her blood sugar with use of CGM and to utilize her correction scale in addition to her 2 units of prandial insulin. Patient c/o vaginismus .   Is aware to seek evaluation and treatment by primary care, courtesy refill of Diflucan provided    - fluconazole (DIFLUCAN) 150 MG tablet; Take 1 tablet by mouth every 72 hours for 6 days  Dispense: 2 tablet; Refill: 0  - [place] CONTINUOUS GLUCOSE MONITORING ANALYSIS I&R      2. Post-surgical hypothyroidism    She would prefer to have these labs done with primary care, she is taking her medication correctly       3. History of Graves' disease   Advised that diagnosis of one autoimmune disease increases risk of developing other autoimmune disease. Importance of self care, stress management, self monitoring was previously emphasized. Pavithra Mg was seen today for diabetes. Diagnoses and all orders for this visit:    Type 1 diabetes mellitus with hyperglycemia (HCC)  -     fluconazole (DIFLUCAN) 150 MG tablet; Take 1 tablet by mouth every 72 hours for 6 days  -     [place] CONTINUOUS GLUCOSE MONITORING ANALYSIS I&R    Post-surgical hypothyroidism    History of Graves' disease          History of Present Illness:      2/28/2023   Interim diabetes HPI:  Patient with type 1 diabetes returns to clinic for follow-up. She continues to take high-dose basal insulin but is now taking very low-dose prandial insulin plus occasional correction.   Glycemic control is highly variable with greater than 5% hypoglycemia    \"I don't do less than 30 of lantus\"   \"it won't work for [employer] \"i've tried it before and I can't keep my fasting sugars down\"    \"My vision will not allow me to use a pump\"      Interim medical history changes:   Left ankle fracture with ORIF and slow convalesces     Lifestyle Update:  Lower carb diet    Current Regimen: Lantus 30 units, Novolog  2 units AC some meal, 2/50>200 (usually twice daily)    Glucose data:    Home blood glucose monitoring frequency:   By review of CGM download over past 30 days  Average blood glucose 126 ± 41  Time in range 85%  High 9%, Very High 1%  Low 5%, Very Low <1%     Typical Standard Deviation   Fasting 121 35   AC lunch 130 40   AC supper 132 38   Bedtime 127 43     Blood glucose levels are uncontrolled, frequent hypoglycemia       Failed past therapies:     Relevant co morbidities:    Denies  HX pancreatitis, DKA, gastroparesis, foot ulcer    Optho:  Last eye exam was Feb 2023 and demonstrated longstanding HX diabetic retinopathy, complicated by glucoma. Eye care specialist is Dr. Didier Guerrero getting checked every 8 weeks, retina specialist Dr Ernesto Cleary       Obesity:         Body mass index is 24.48 kg/m². stable      Wt Readings from Last 3 Encounters:   02/28/23 161 lb (73 kg)   01/24/23 160 lb (72.6 kg)   01/17/23 160 lb (72.6 kg)         CardioVascular:    None     Renal:    Under care of nephro? no    On ARB/ACE-I  valsartan - 320 MG     07/25/2018      Cr 0.71, GFR 91, microalbumin/Cr ratio 27.6                           01/22/2019      Cr 0.72, GFR 89                           04/18/2019      Cr 0.68, GFR 93                           07/01/2020      Cr 0.66, GFR 93, microalbumin/Cr ratio 101                           01/15/2021      Cr  0.68, GFR 91      01/17/2023 Cr 0.60, GFR >60, microalbumin/Cr ratio 256       Lipids:     Current therapy This patient does not have an active medication from one of the medication groupers.       01/22/2019  TC- 268, LDL- 139, VLDL- 20,  HDL- 109, TG- 98    10/02/2022  TC- 237, LDL- 118, VLDL- 20.2,  HDL- 98, TG- 101        Lab Results   Component Value Date    CHOL 237 (H) 10/02/2022    CHOL 247 (H) 12/21/2021    CHOL 236 (H) 01/15/2021     Lab Results   Component Value Date    LDLCALC 118.8 (H) 10/02/2022    LDLCALC 149 (H) 12/21/2021    LDLCALC 122 (H) 01/15/2021      Lab Results   Component Value Date    LABVLDL 20.2 10/02/2022    VLDL 14 12/21/2021    VLDL 15 01/15/2021    VLDL 12 10/02/2020      Lab Results   Component Value Date    HDL 98 (H) 10/02/2022    HDL 84 12/21/2021    HDL 99 01/15/2021      Lab Results   Component Value Date    HDL 98 (H) 10/02/2022    HDL 84 12/21/2021    HDL 99 01/15/2021      Lab Results   Component Value Date    TRIG 101 10/02/2022    TRIG 82 12/21/2021    TRIG 89 01/15/2021     Lab Results   Component Value Date    MICAHO 2.4 10/02/2022         Hemoglobin A1c:               07/25/2018      6.0%               01/22/2019      6.3%               04/18/2019      6.2%               01/16/2020      6.3%               07/01/2020      6.4%               01/15/2021      5.7%                   04/15/2021      5.6%                      12/21/2021      6.6%                      05/03/2022      5.7%    06/24/2022 6.2%    01/17/2023 6.1%                 Hemoglobin A1C, POC   Date Value Ref Range Status   05/03/2022 5.7 % Final   04/15/2021 5.6 % Final        Thyroid:         Pt with post surgical hypothyroidism     Taking 112mcg levothyroxine, taking correctly                   01/22/2019      5.380               04/18/2019      1.500               01/16/2020      2. 110                              01/15/2021      2.740  Lab Results   Component Value Date/Time    TSH 2.740 01/15/2021 08:23 AM    TSH 1.170 10/02/2020 08:51 AM    TSH 2.110 01/16/2020 09:08 AM          Reviewed and updated this visit by provider:  Tobacco  Allergies  Meds  Problems  Med Hx  Surg Hx  Fam Hx                      Allergies & Medications:  Reviewed in chart. Review of Systems   Genitourinary:         Suprapubic pressure, frequent urination x 3 days     Vital Signs:  BP (!) 165/75   Pulse (!) 121   Wt 161 lb (73 kg)   SpO2 96%   BMI 24.48 kg/m²       Physical Exam  Constitutional:       Appearance: Normal appearance. HENT:      Head: Normocephalic. Neck:      Thyroid: No thyroid mass or thyromegaly. Vascular: No carotid bruit. Cardiovascular:      Rate and Rhythm: Normal rate and regular rhythm. Pulmonary:      Effort: Pulmonary effort is normal.      Breath sounds: Normal breath sounds. Abdominal:      Palpations: Abdomen is soft. Musculoskeletal:      Cervical back: Neck supple. Right lower leg: No edema.       Left lower leg: No edema. Feet:      Right foot:      Protective Sensation: 3 sites tested. 3 sites sensed. Skin integrity: Skin integrity normal.      Left foot:      Protective Sensation: 3 sites tested. 3 sites sensed. Skin integrity: Skin integrity normal.      Comments: Left lateral malleolus swelling no heat, erythema, purulence, or sign of infection    Lymphadenopathy:      Cervical: No cervical adenopathy. Skin:     General: Skin is warm and dry. Neurological:      General: No focal deficit present. Mental Status: She is alert. Sensory: Sensation is intact. Psychiatric:         Mood and Affect: Mood normal.         Behavior: Behavior normal.         Thought Content: Thought content normal.         Judgment: Judgment normal.           Return in about 6 months (around 8/28/2023) for Type 1 Diabetes f/u. Portions of this note were generated with the assistance of voice recogniton software. As such, some errors in transcription may be present.

## 2023-03-01 ENCOUNTER — TELEPHONE (OUTPATIENT)
Dept: ENDOCRINOLOGY | Age: 68
End: 2023-03-01

## 2023-03-01 ENCOUNTER — OFFICE VISIT (OUTPATIENT)
Dept: INTERNAL MEDICINE CLINIC | Facility: CLINIC | Age: 68
End: 2023-03-01

## 2023-03-01 VITALS
HEART RATE: 67 BPM | OXYGEN SATURATION: 97 % | BODY MASS INDEX: 24.48 KG/M2 | SYSTOLIC BLOOD PRESSURE: 128 MMHG | DIASTOLIC BLOOD PRESSURE: 60 MMHG | HEIGHT: 68 IN

## 2023-03-01 DIAGNOSIS — E10.29 CONTROLLED TYPE 1 DIABETES MELLITUS WITH MICROALBUMINURIA (HCC): ICD-10-CM

## 2023-03-01 DIAGNOSIS — R80.9 CONTROLLED TYPE 1 DIABETES MELLITUS WITH MICROALBUMINURIA (HCC): ICD-10-CM

## 2023-03-01 DIAGNOSIS — N39.0 RECURRENT UTI: ICD-10-CM

## 2023-03-01 DIAGNOSIS — B37.31 YEAST VAGINITIS: Primary | ICD-10-CM

## 2023-03-01 DIAGNOSIS — R30.9 URINARY PAIN: ICD-10-CM

## 2023-03-01 LAB
BILIRUBIN, URINE, POC: NEGATIVE
BLOOD URINE, POC: NEGATIVE
GLUCOSE URINE, POC: NEGATIVE
KETONES, URINE, POC: NEGATIVE
LEUKOCYTE ESTERASE, URINE, POC: NEGATIVE
NITRITE, URINE, POC: NEGATIVE
PH, URINE, POC: 6 (ref 4.6–8)
PROTEIN,URINE, POC: NEGATIVE
SPECIFIC GRAVITY, URINE, POC: 1.01 (ref 1–1.03)
URINALYSIS CLARITY, POC: CLEAR
URINALYSIS COLOR, POC: YELLOW
UROBILINOGEN, POC: NORMAL

## 2023-03-01 RX ORDER — PHENAZOPYRIDINE HYDROCHLORIDE 200 MG/1
200 TABLET, FILM COATED ORAL 3 TIMES DAILY PRN
Qty: 20 TABLET | Refills: 0 | Status: SHIPPED | OUTPATIENT
Start: 2023-03-01 | End: 2023-03-04

## 2023-03-01 SDOH — ECONOMIC STABILITY: INCOME INSECURITY: HOW HARD IS IT FOR YOU TO PAY FOR THE VERY BASICS LIKE FOOD, HOUSING, MEDICAL CARE, AND HEATING?: NOT VERY HARD

## 2023-03-01 SDOH — ECONOMIC STABILITY: FOOD INSECURITY: WITHIN THE PAST 12 MONTHS, THE FOOD YOU BOUGHT JUST DIDN'T LAST AND YOU DIDN'T HAVE MONEY TO GET MORE.: NEVER TRUE

## 2023-03-01 SDOH — ECONOMIC STABILITY: FOOD INSECURITY: WITHIN THE PAST 12 MONTHS, YOU WORRIED THAT YOUR FOOD WOULD RUN OUT BEFORE YOU GOT MONEY TO BUY MORE.: NEVER TRUE

## 2023-03-01 SDOH — ECONOMIC STABILITY: HOUSING INSECURITY
IN THE LAST 12 MONTHS, WAS THERE A TIME WHEN YOU DID NOT HAVE A STEADY PLACE TO SLEEP OR SLEPT IN A SHELTER (INCLUDING NOW)?: NO

## 2023-03-01 ASSESSMENT — PATIENT HEALTH QUESTIONNAIRE - PHQ9
SUM OF ALL RESPONSES TO PHQ QUESTIONS 1-9: 0
SUM OF ALL RESPONSES TO PHQ9 QUESTIONS 1 & 2: 0
SUM OF ALL RESPONSES TO PHQ QUESTIONS 1-9: 0
1. LITTLE INTEREST OR PLEASURE IN DOING THINGS: 0
SUM OF ALL RESPONSES TO PHQ QUESTIONS 1-9: 0
SUM OF ALL RESPONSES TO PHQ QUESTIONS 1-9: 0
2. FEELING DOWN, DEPRESSED OR HOPELESS: 0

## 2023-03-01 NOTE — TELEPHONE ENCOUNTER
Spoke with patient telling her that her patient assistance came in the Lantus and she was a bit upset because it was apparently not supposed to be sent here. She was wondering hoe she will get that changed. She said she will call us back later wanting to talk to [place] [facility] to see what they can do to fix it.

## 2023-03-01 NOTE — PROGRESS NOTES
Trnio Miranda (:  1955) is a 75 y.o. female,Established patient, here for evaluation of the following chief complaint(s):  Frequent/Recurrent UTI         ASSESSMENT/PLAN:  1. Yeast vaginitis  2. Recurrent UTI  -     AMB POC URINALYSIS DIP STICK MANUAL W/O MICRO  -     Culture, Urine; Future  3. Urinary pain  -     AMB POC URINALYSIS DIP STICK MANUAL W/O MICRO  -     Culture, Urine; Future  -     [id number], [address] [place] [address]  4. Controlled type 1 diabetes mellitus with microalbuminuria [facility])  -     [facility], [address] [place] [address]      No follow-ups on file. Declines pelvic exam  UA is clear  Send culture, last culture was clear  Suspect symptoms are yeast, itching, burning, and elevated sugars and wears incontinence pads  She was given diflucan by endocrinology  Add miconazole nightly  May try pyridium and hydrate well, last renal function normal  Hydrate well      Subjective   SUBJECTIVE/OBJECTIVE:  Patient is here recurrent itching and burning and urinary pressure. She has OAB. She finished abx for her symptoms. That helped but the symptoms returned. She was given diflucan for likely yeast     Frequent/Recurrent UTI  This is a new problem. The current episode started 1 to 4 weeks ago. The problem is unchanged. Associated symptoms include pain. Pertinent negatives include no hematuria. The pain is present in the abdomen, urethra, perineum, bladder and pelvis (above the pubic and feels pressure, itching and burning too). Review of Systems   Genitourinary:  Negative for hematuria. Objective   Physical Exam  Constitutional:       Appearance: Normal appearance. Eyes:      Extraocular Movements: Extraocular movements intact. Pupils: Pupils are equal, round, and reactive to light. Cardiovascular:      Rate and Rhythm: Normal rate. Pulmonary:      Effort: Pulmonary effort is normal.   Musculoskeletal:      Cervical back: Normal range of motion. Neurological:      Mental Status: She is alert. Psychiatric:         Mood and Affect: Mood normal.         Behavior: Behavior normal.                An electronic signature was used to authenticate this note.     --LON Barrios - CNP

## 2023-03-02 NOTE — TELEPHONE ENCOUNTER
Pt LVM with an update from [facility] ([facility])  LS advised pt have ENDO prov switch her Lantus to her PCP, Dr. Kathleen Chu bc their office is closer to her home vs our office. Stated she does not drive and the inconvenience to come  Lantus. However next week pt said her sister can brig her for the Lantus we have in the fridge now + informed of office hours     How would you like to proceed?

## 2023-03-03 ENCOUNTER — CLINICAL DOCUMENTATION (OUTPATIENT)
Dept: ENDOCRINOLOGY | Age: 68
End: 2023-03-03

## 2023-03-03 DIAGNOSIS — G89.29 OTHER CHRONIC PAIN: Primary | ICD-10-CM

## 2023-03-03 DIAGNOSIS — E10.42 TYPE 1 DIABETES MELLITUS WITH DIABETIC POLYNEUROPATHY (HCC): ICD-10-CM

## 2023-03-03 RX ORDER — HYDROCODONE BITARTRATE AND ACETAMINOPHEN 7.5; 325 MG/1; MG/1
1 TABLET ORAL EVERY 6 HOURS PRN
Qty: 120 TABLET | Refills: 0 | Status: SHIPPED | OUTPATIENT
Start: 2023-03-03 | End: 2023-04-02

## 2023-03-03 RX ORDER — HYDROCODONE BITARTRATE AND ACETAMINOPHEN 7.5; 325 MG/1; MG/1
1 TABLET ORAL EVERY 6 HOURS PRN
Qty: 120 TABLET | Refills: 0 | Status: SHIPPED | OUTPATIENT
Start: 2023-04-03 | End: 2023-05-03

## 2023-03-03 NOTE — PROGRESS NOTES
Patient aware that [employer] patient assistance is here. We asked if she could  her medication before 5pm today due to possible power outages over the weekend. Pt states she could not pick it up due to transportation issues.

## 2023-03-04 LAB
BACTERIA SPEC CULT: NORMAL
SERVICE CMNT-IMP: NORMAL

## 2023-03-07 NOTE — TELEPHONE ENCOUNTER
I spoke to the patient and she is going to start getting Dr. Susy Coleman to fill out her paperwork and have Keke Negro send it there since it is closer to her home. Keke Negro called the patient and said that next time if Dr. Susy Coleman can fill out patient 3 with his information it can go to TR office. I told the patient I will message Dr. Susy Coleman assistant to see if they can do this in the future. Jami Velez let me know what you need from us.   Thank you

## 2023-03-08 NOTE — TELEPHONE ENCOUNTER
Pt came in office to  Pt Asst meds (2 boxes of Lantus) Lisandra gave meds to pt in lobby, while I was rooming patient. Pt also gave [employer] info- gae information to [place].

## 2023-03-09 RX ORDER — INSULIN LISPRO 100 [IU]/ML
INJECTION, SOLUTION INTRAVENOUS; SUBCUTANEOUS
Qty: 30 ML | Refills: 3 | Status: SHIPPED | OUTPATIENT
Start: 2023-03-09

## 2023-03-09 RX ORDER — INSULIN GLARGINE 100 [IU]/ML
30 INJECTION, SOLUTION SUBCUTANEOUS NIGHTLY
Qty: 27 ML | Refills: 4 | Status: SHIPPED | OUTPATIENT
Start: 2023-03-09

## 2023-03-09 NOTE — TELEPHONE ENCOUNTER
3/8/23 Patient mailed back Patient Assistance Paperwork with her SS Benefits statement. 3/9/23 Placed [employer] in bubl box to be signed. Need Paper script for Lantus and Humalog.

## 2023-03-10 NOTE — TELEPHONE ENCOUNTER
Called patient to verify where she needs her Patient Assistant Medication sent to. [employer] she has checked for home delivery and [employer] to our office and can't per [employer] to patients residence. So she would like it shipped to her PCP - Rupal Covarrubias MD office. Please see conversation on 3/1/23, Patient will need Dr. Sidney Porter office fill out [facility] Patient Assistance. Faxed Patients filled out portion/income/insurance cards to them([employer]) since it will have to be filled out by her PCP to have it delivered there. (3/10 - Faxed)      Faxed [facility](Humalog)Patient Asst to fx#[phone].

## 2023-04-25 ENCOUNTER — NURSE ONLY (OUTPATIENT)
Dept: INTERNAL MEDICINE CLINIC | Facility: CLINIC | Age: 68
End: 2023-04-25

## 2023-04-25 DIAGNOSIS — E10.42 TYPE 1 DIABETES MELLITUS WITH DIABETIC POLYNEUROPATHY (HCC): ICD-10-CM

## 2023-04-26 LAB
ALBUMIN SERPL-MCNC: 4.1 G/DL (ref 3.2–4.6)
ALBUMIN/GLOB SERPL: 1.3 (ref 0.4–1.6)
ALP SERPL-CCNC: 74 U/L (ref 50–136)
ALT SERPL-CCNC: 23 U/L (ref 12–65)
ANION GAP SERPL CALC-SCNC: 6 MMOL/L (ref 2–11)
AST SERPL-CCNC: 18 U/L (ref 15–37)
BILIRUB SERPL-MCNC: 0.5 MG/DL (ref 0.2–1.1)
BUN SERPL-MCNC: 16 MG/DL (ref 8–23)
CALCIUM SERPL-MCNC: 9.3 MG/DL (ref 8.3–10.4)
CHLORIDE SERPL-SCNC: 106 MMOL/L (ref 101–110)
CO2 SERPL-SCNC: 27 MMOL/L (ref 21–32)
CREAT SERPL-MCNC: 0.7 MG/DL (ref 0.6–1)
CREAT UR-MCNC: 14 MG/DL
EST. AVERAGE GLUCOSE BLD GHB EST-MCNC: 126 MG/DL
GLOBULIN SER CALC-MCNC: 3.1 G/DL (ref 2.8–4.5)
GLUCOSE SERPL-MCNC: 102 MG/DL (ref 65–100)
HBA1C MFR BLD: 6 % (ref 4.8–5.6)
MICROALBUMIN UR-MCNC: 1.89 MG/DL (ref 0–3)
MICROALBUMIN/CREAT UR-RTO: 135 MG/G (ref 0–30)
POTASSIUM SERPL-SCNC: 4.3 MMOL/L (ref 3.5–5.1)
PROT SERPL-MCNC: 7.2 G/DL (ref 6.3–8.2)
SODIUM SERPL-SCNC: 139 MMOL/L (ref 133–143)
TSH W FREE THYROID IF ABNORMAL: 0.96 UIU/ML (ref 0.36–3.74)

## 2023-05-02 ENCOUNTER — OFFICE VISIT (OUTPATIENT)
Dept: INTERNAL MEDICINE CLINIC | Facility: CLINIC | Age: 68
End: 2023-05-02
Payer: MEDICARE

## 2023-05-02 VITALS
HEIGHT: 68 IN | DIASTOLIC BLOOD PRESSURE: 60 MMHG | WEIGHT: 161 LBS | OXYGEN SATURATION: 95 % | TEMPERATURE: 97.8 F | HEART RATE: 87 BPM | SYSTOLIC BLOOD PRESSURE: 132 MMHG | BODY MASS INDEX: 24.4 KG/M2

## 2023-05-02 DIAGNOSIS — R35.89 POLYURIA: ICD-10-CM

## 2023-05-02 DIAGNOSIS — G89.29 OTHER CHRONIC PAIN: ICD-10-CM

## 2023-05-02 DIAGNOSIS — R32 URINARY INCONTINENCE, UNSPECIFIED TYPE: ICD-10-CM

## 2023-05-02 DIAGNOSIS — I10 ESSENTIAL HYPERTENSION: Primary | ICD-10-CM

## 2023-05-02 DIAGNOSIS — B37.9 YEAST INFECTION: ICD-10-CM

## 2023-05-02 PROCEDURE — 99214 OFFICE O/P EST MOD 30 MIN: CPT | Performed by: INTERNAL MEDICINE

## 2023-05-02 PROCEDURE — 1123F ACP DISCUSS/DSCN MKR DOCD: CPT | Performed by: INTERNAL MEDICINE

## 2023-05-02 PROCEDURE — 3074F SYST BP LT 130 MM HG: CPT | Performed by: INTERNAL MEDICINE

## 2023-05-02 PROCEDURE — 3078F DIAST BP <80 MM HG: CPT | Performed by: INTERNAL MEDICINE

## 2023-05-02 RX ORDER — HYDROCODONE BITARTRATE AND ACETAMINOPHEN 10; 325 MG/1; MG/1
1 TABLET ORAL EVERY 6 HOURS PRN
Qty: 120 TABLET | Refills: 0 | Status: SHIPPED | OUTPATIENT
Start: 2023-07-02 | End: 2023-08-01

## 2023-05-02 RX ORDER — HYDROCODONE BITARTRATE AND ACETAMINOPHEN 10; 325 MG/1; MG/1
1 TABLET ORAL EVERY 6 HOURS PRN
Qty: 120 TABLET | Refills: 0 | Status: SHIPPED | OUTPATIENT
Start: 2023-05-02 | End: 2023-06-01

## 2023-05-02 RX ORDER — HYDROCODONE BITARTRATE AND ACETAMINOPHEN 10; 325 MG/1; MG/1
1 TABLET ORAL EVERY 8 HOURS PRN
Qty: 120 TABLET | Refills: 0 | Status: SHIPPED | OUTPATIENT
Start: 2023-06-02 | End: 2023-05-03 | Stop reason: SDUPTHER

## 2023-05-02 RX ORDER — FLUCONAZOLE 150 MG/1
TABLET ORAL
Qty: 2 TABLET | Refills: 0 | Status: SHIPPED | OUTPATIENT
Start: 2023-05-02

## 2023-05-02 ASSESSMENT — PATIENT HEALTH QUESTIONNAIRE - PHQ9
4. FEELING TIRED OR HAVING LITTLE ENERGY: 0
3. TROUBLE FALLING OR STAYING ASLEEP: 0
SUM OF ALL RESPONSES TO PHQ QUESTIONS 1-9: 0
SUM OF ALL RESPONSES TO PHQ9 QUESTIONS 1 & 2: 0
6. FEELING BAD ABOUT YOURSELF - OR THAT YOU ARE A FAILURE OR HAVE LET YOURSELF OR YOUR FAMILY DOWN: 0
SUM OF ALL RESPONSES TO PHQ QUESTIONS 1-9: 0
9. THOUGHTS THAT YOU WOULD BE BETTER OFF DEAD, OR OF HURTING YOURSELF: 0
SUM OF ALL RESPONSES TO PHQ QUESTIONS 1-9: 0
8. MOVING OR SPEAKING SO SLOWLY THAT OTHER PEOPLE COULD HAVE NOTICED. OR THE OPPOSITE, BEING SO FIGETY OR RESTLESS THAT YOU HAVE BEEN MOVING AROUND A LOT MORE THAN USUAL: 0
2. FEELING DOWN, DEPRESSED OR HOPELESS: 0
SUM OF ALL RESPONSES TO PHQ QUESTIONS 1-9: 0
7. TROUBLE CONCENTRATING ON THINGS, SUCH AS READING THE NEWSPAPER OR WATCHING TELEVISION: 0
1. LITTLE INTEREST OR PLEASURE IN DOING THINGS: 0
5. POOR APPETITE OR OVEREATING: 0

## 2023-05-02 ASSESSMENT — ENCOUNTER SYMPTOMS
BACK PAIN: 1
CHEST TIGHTNESS: 0
SHORTNESS OF BREATH: 0

## 2023-05-02 NOTE — PROGRESS NOTES
135 (H)  0 - 30 mg/g Final    Sodium 04/25/2023 139  133 - 143 mmol/L Final    Potassium 04/25/2023 4.3  3.5 - 5.1 mmol/L Final    Chloride 04/25/2023 106  101 - 110 mmol/L Final    CO2 04/25/2023 27  21 - 32 mmol/L Final    Anion Gap 04/25/2023 6  2 - 11 mmol/L Final    Glucose 04/25/2023 102 (H)  65 - 100 mg/dL Final    BUN 04/25/2023 16  8 - 23 MG/DL Final    Creatinine 04/25/2023 0.70  0.6 - 1.0 MG/DL Final    Est, Glom Filt Rate 04/25/2023 >60  >60 ml/min/1.73m2 Final    Comment:   Pediatric calculator link: Ruth.at. org/professionals/kdoqi/gfr_calculatorped    These results are not intended for use in patients <25years of age. eGFR results are calculated without a race factor using  the 2021 CKD-EPI equation. Careful clinical correlation is recommended, particularly when comparing to results calculated using previous equations. The CKD-EPI equation is less accurate in patients with extremes of muscle mass, extra-renal metabolism of creatinine, excessive creatine ingestion, or following therapy that affects renal tubular secretion.       Calcium 04/25/2023 9.3  8.3 - 10.4 MG/DL Final    Total Bilirubin 04/25/2023 0.5  0.2 - 1.1 MG/DL Final    ALT 04/25/2023 23  12 - 65 U/L Final    AST 04/25/2023 18  15 - 37 U/L Final    Alk Phosphatase 04/25/2023 74  50 - 136 U/L Final    Total Protein 04/25/2023 7.2  6.3 - 8.2 g/dL Final    Albumin 04/25/2023 4.1  3.2 - 4.6 g/dL Final    Globulin 04/25/2023 3.1  2.8 - 4.5 g/dL Final    Albumin/Globulin Ratio 04/25/2023 1.3  0.4 - 1.6   Final    Hemoglobin A1C 04/25/2023 6.0 (H)  4.8 - 5.6 % Final    eAG 04/25/2023 126  mg/dL Final    Comment: Reference Range  Normal: 4.8-5.6  Diabetic >=6.5%  Normal       <5.7%      TSH w Free Thyroid if Abnormal 04/25/2023 0.96  0.358 - 3.740 UIU/ML Final        Past Medical History:   Diagnosis Date    Benign paroxysmal positional vertigo     Breast cancer ([facility] 12/2010    Cancer ([facility] 2010    lt breast    Cancer ([facility] 1988

## 2023-05-03 ENCOUNTER — TELEPHONE (OUTPATIENT)
Dept: INTERNAL MEDICINE CLINIC | Facility: CLINIC | Age: 68
End: 2023-05-03

## 2023-05-03 RX ORDER — HYDROCODONE BITARTRATE AND ACETAMINOPHEN 10; 325 MG/1; MG/1
1 TABLET ORAL EVERY 6 HOURS PRN
Qty: 120 TABLET | Refills: 0 | Status: SHIPPED | OUTPATIENT
Start: 2023-06-02 | End: 2023-07-02

## 2023-05-03 RX ORDER — LOSARTAN POTASSIUM 100 MG/1
100 TABLET ORAL DAILY
Qty: 90 TABLET | Refills: 3 | Status: SHIPPED | OUTPATIENT
Start: 2023-05-03

## 2023-05-03 NOTE — TELEPHONE ENCOUNTER
Patient called requesting a refill on her Losartan. Patient confirmed pharmacy. Please Advise.        [address] on [address] [place] [address]

## 2023-05-04 ENCOUNTER — OFFICE VISIT (OUTPATIENT)
Dept: UROLOGY | Age: 68
End: 2023-05-04
Payer: MEDICARE

## 2023-05-04 DIAGNOSIS — N39.41 URGE INCONTINENCE OF URINE: ICD-10-CM

## 2023-05-04 DIAGNOSIS — N32.81 OAB (OVERACTIVE BLADDER): Primary | ICD-10-CM

## 2023-05-04 LAB
BILIRUBIN, URINE, POC: NEGATIVE
BLOOD URINE, POC: NEGATIVE
GLUCOSE URINE, POC: NEGATIVE
KETONES, URINE, POC: NORMAL
LEUKOCYTE ESTERASE, URINE, POC: NEGATIVE
NITRITE, URINE, POC: NEGATIVE
PH, URINE, POC: 5.5 (ref 4.6–8)
PROTEIN,URINE, POC: NEGATIVE
SPECIFIC GRAVITY, URINE, POC: 1 (ref 1–1.03)
URINALYSIS CLARITY, POC: NORMAL
URINALYSIS COLOR, POC: NORMAL
UROBILINOGEN, POC: NORMAL

## 2023-05-04 PROCEDURE — 99204 OFFICE O/P NEW MOD 45 MIN: CPT | Performed by: NURSE PRACTITIONER

## 2023-05-04 PROCEDURE — 1123F ACP DISCUSS/DSCN MKR DOCD: CPT | Performed by: NURSE PRACTITIONER

## 2023-05-04 PROCEDURE — 81003 URINALYSIS AUTO W/O SCOPE: CPT | Performed by: NURSE PRACTITIONER

## 2023-05-04 ASSESSMENT — ENCOUNTER SYMPTOMS
DIARRHEA: 0
ABDOMINAL PAIN: 0
WHEEZING: 0
BLOOD IN STOOL: 0
COUGH: 0
INDIGESTION: 0
VOMITING: 0
SHORTNESS OF BREATH: 0
EYE PAIN: 0
NAUSEA: 0
HEARTBURN: 0
SKIN LESIONS: 0
BACK PAIN: 0
CONSTIPATION: 0
EYE DISCHARGE: 0

## 2023-05-04 NOTE — PROGRESS NOTES
[facility]  [address] [facility]    [address] [place] [address], [address] [facility]  [phone]          Frederick Contreras  : 1955    Chief Complaint   Patient presents with    New Patient    Incontinence          HPI     Frederick Contreras is a 75 y.o. female referred for OAB and UUI. She was last sen in 2016 for these same issues. She has sign urinary frequency, urinary urgency, and UUI. She tried Myrbetriq in the past but had sign SE. She cannot use estrogen cream d/t sign breast CA. She is a type 1 diabetic. She has also had colon CA requiring resection in . She had prev TVH-BSO d/t dysplasia. Her ongoing bladder control issues are sign impacting her QOL. She does NOT want add medications. She would like to discuss alt options. 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 Past Medical History:   Diagnosis Date    Benign paroxysmal positional vertigo     Breast cancer ([address] 2010    Cancer ([address]     lt breast    Cancer ([address]     colon    Chronic pain     Chronic renal disease, stage III [facility]) [[id number]] 2022    Diabetic polyneuropathy associated with type 1 diabetes mellitus ([address] 2020    Gastroparesis     GERD (gastroesophageal reflux disease)     medicaton controlled    Glaucoma     Hiatal hernia     HLD (hyperlipidemia)     Hypertension     medication controlled    Neuropathy, diabetic ([address]     Osteoporosis, unspecified     Personal history of malignant neoplasm of breast     Retinopathy due to secondary diabetes ([address]     Thyroid disease     medication controlled    Type 1 diabetes ([address] 5years of age    insulin, Hemoglobin A1c 6.0 on 18.      Unspecified hereditary and idiopathic peripheral neuropathy     Unspecified hypothyroidism      Past Surgical History:   Procedure Laterality Date    ANKLE FRACTURE SURGERY Left 10/3/2022    LEFT ANKLE OPEN REDUCTION INTERNAL FIXATION performed by Maicol Saravia MD at [address] [facility]    COLONOSCOPY

## 2023-05-12 ENCOUNTER — TELEPHONE (OUTPATIENT)
Dept: ENDOCRINOLOGY | Age: 68
End: 2023-05-12

## 2023-05-15 ENCOUNTER — TELEPHONE (OUTPATIENT)
Dept: UROLOGY | Age: 68
End: 2023-05-15

## 2023-05-15 RX ORDER — FLUCONAZOLE 150 MG/1
150 TABLET ORAL
Qty: 2 TABLET | Refills: 0 | Status: SHIPPED | OUTPATIENT
Start: 2023-05-15

## 2023-05-15 NOTE — TELEPHONE ENCOUNTER
Diflucan sent. We typically are out one month for placement of permanent stimulator.     Wes Stephenson, APRN - CNP

## 2023-05-15 NOTE — TELEPHONE ENCOUNTER
Pt called asking how long after the interstim trial can she have the permanent one put in? Also, she said she has a yeast infection and would like Diflucan sent to [facility] on [address] [facility]. Please advise.

## 2023-06-01 DIAGNOSIS — S82.845B: Primary | ICD-10-CM

## 2023-06-01 DIAGNOSIS — M81.0 OSTEOPOROSIS, UNSPECIFIED OSTEOPOROSIS TYPE, UNSPECIFIED PATHOLOGICAL FRACTURE PRESENCE: ICD-10-CM

## 2023-06-20 ENCOUNTER — OFFICE VISIT (OUTPATIENT)
Dept: INTERNAL MEDICINE CLINIC | Facility: CLINIC | Age: 68
End: 2023-06-20
Payer: MEDICARE

## 2023-06-20 VITALS
HEIGHT: 68 IN | WEIGHT: 156 LBS | SYSTOLIC BLOOD PRESSURE: 140 MMHG | BODY MASS INDEX: 23.64 KG/M2 | RESPIRATION RATE: 17 BRPM | HEART RATE: 85 BPM | DIASTOLIC BLOOD PRESSURE: 60 MMHG | OXYGEN SATURATION: 97 %

## 2023-06-20 DIAGNOSIS — R30.9 URINARY PAIN: ICD-10-CM

## 2023-06-20 DIAGNOSIS — N90.89 VULVAR IRRITATION: Primary | ICD-10-CM

## 2023-06-20 PROCEDURE — 3077F SYST BP >= 140 MM HG: CPT | Performed by: NURSE PRACTITIONER

## 2023-06-20 PROCEDURE — 81003 URINALYSIS AUTO W/O SCOPE: CPT | Performed by: NURSE PRACTITIONER

## 2023-06-20 PROCEDURE — 3078F DIAST BP <80 MM HG: CPT | Performed by: NURSE PRACTITIONER

## 2023-06-20 PROCEDURE — 1123F ACP DISCUSS/DSCN MKR DOCD: CPT | Performed by: NURSE PRACTITIONER

## 2023-06-20 PROCEDURE — 99213 OFFICE O/P EST LOW 20 MIN: CPT | Performed by: NURSE PRACTITIONER

## 2023-06-20 ASSESSMENT — PATIENT HEALTH QUESTIONNAIRE - PHQ9
SUM OF ALL RESPONSES TO PHQ QUESTIONS 1-9: 0
SUM OF ALL RESPONSES TO PHQ9 QUESTIONS 1 & 2: 0
1. LITTLE INTEREST OR PLEASURE IN DOING THINGS: 0
SUM OF ALL RESPONSES TO PHQ QUESTIONS 1-9: 0
2. FEELING DOWN, DEPRESSED OR HOPELESS: 0

## 2023-06-20 NOTE — PROGRESS NOTES
Joey Berrios (:  1955) is a 75 y.o. female,Established patient, here for evaluation of the following chief complaint(s):  Yeast Infection         ASSESSMENT/PLAN:  1. Vulvar irritation  -     NuSwab Vaginitis Plus (VG+) with Canddia (Six Species); Future  2. Urinary pain  -     AMB POC URINALYSIS DIP STICK AUTO W/O MICRO      No follow-ups on file. Discussed no evidence of vulvovaginitis  But suspect that the wetness from her incontinence pads is macerating and irritating her skin  Recommend barrier cream  Has f/u with urology for medtronic device   Recommend drying vulva after urination - she is using wet-wipes but recommend patting dry    Subjective   SUBJECTIVE/OBJECTIVE:  Patient is here for recurrent yeast symptoms. She has urine odor that is very stinky. She gets odor quickly after washing as well. She is doing medtronic device with urology in August.   She has burning of the vulva all the time and in between the labia feels like it is burning. She took diflucan and that helped some - she didn't feel the miconazole helped. She did it 3 nights in a row w/o improvement. Review of Systems       Objective   Physical Exam  Vitals reviewed. Exam conducted with a chaperone present. Eyes:      Extraocular Movements: Extraocular movements intact. Pupils: Pupils are equal, round, and reactive to light. Cardiovascular:      Rate and Rhythm: Normal rate. Pulmonary:      Effort: Pulmonary effort is normal.   Genitourinary:     Comments: No red rash on vulva but pink skin, macerated around the rectum   Urethra bulbous  Musculoskeletal:      Cervical back: Neck supple. Neurological:      Mental Status: She is alert. Psychiatric:         Mood and Affect: Mood normal.                An electronic signature was used to authenticate this note.     --Carlos Harrison, APRN - CNP

## 2023-06-25 LAB
A VAGINAE DNA VAG QL NAA+PROBE: NORMAL SCORE
BVAB2 DNA VAG QL NAA+PROBE: NORMAL SCORE
C ALBICANS DNA VAG QL NAA+PROBE: NEGATIVE
C GLABRATA DNA VAG QL NAA+PROBE: NEGATIVE
C TRACH RRNA SPEC QL NAA+PROBE: NEGATIVE
CANDIDA KRUSEI: NEGATIVE
CANDIDA LUSITANIAE, NAA, 180015: NEGATIVE
CANDIDA PARAPSILOSIS/TROPICALIS: NEGATIVE
MEGA1 DNA VAG QL NAA+PROBE: NORMAL SCORE
N GONORRHOEA RRNA SPEC QL NAA+PROBE: NEGATIVE
T VAGINALIS RRNA SPEC QL NAA+PROBE: NEGATIVE

## 2023-07-25 ENCOUNTER — HOSPITAL ENCOUNTER (OUTPATIENT)
Dept: MAMMOGRAPHY | Age: 68
Discharge: HOME | End: 2023-07-28
Attending: INTERNAL MEDICINE
Payer: MEDICARE

## 2023-07-25 DIAGNOSIS — Z12.31 VISIT FOR SCREENING MAMMOGRAM: ICD-10-CM

## 2023-07-25 DIAGNOSIS — M81.0 OSTEOPOROSIS, UNSPECIFIED OSTEOPOROSIS TYPE, UNSPECIFIED PATHOLOGICAL FRACTURE PRESENCE: ICD-10-CM

## 2023-07-25 PROCEDURE — 77080 DXA BONE DENSITY AXIAL: CPT

## 2023-07-25 PROCEDURE — 77063 BREAST TOMOSYNTHESIS BI: CPT

## 2023-07-26 ENCOUNTER — TELEPHONE (OUTPATIENT)
Dept: INTERNAL MEDICINE CLINIC | Facility: CLINIC | Age: 68
End: 2023-07-26

## 2023-07-26 ENCOUNTER — TELEPHONE (OUTPATIENT)
Dept: ENDOCRINOLOGY | Age: 68
End: 2023-07-26

## 2023-07-26 NOTE — TELEPHONE ENCOUNTER
I called the patient she is upset because her patient assistant program is sending her insulin (Lantus)  to the endocrinology office. I explained that the paper work we sent in had Dr Mir Merritt name and address on it.  She will need to contact [facility] patient assistant program.

## 2023-07-26 NOTE — TELEPHONE ENCOUNTER
Spoke to Pt, notified that her Lantus Pt assistance is here and ready for . She thought it was going to her PCP and not here, but she will  as soon as she can.

## 2023-08-14 ENCOUNTER — OFFICE VISIT (OUTPATIENT)
Dept: INTERNAL MEDICINE CLINIC | Facility: CLINIC | Age: 68
End: 2023-08-14
Payer: MEDICARE

## 2023-08-14 VITALS
SYSTOLIC BLOOD PRESSURE: 130 MMHG | WEIGHT: 159 LBS | OXYGEN SATURATION: 96 % | BODY MASS INDEX: 24.1 KG/M2 | HEIGHT: 68 IN | HEART RATE: 96 BPM | DIASTOLIC BLOOD PRESSURE: 60 MMHG | TEMPERATURE: 97.3 F

## 2023-08-14 DIAGNOSIS — N18.30 STAGE 3 CHRONIC KIDNEY DISEASE, UNSPECIFIED WHETHER STAGE 3A OR 3B CKD (HCC): ICD-10-CM

## 2023-08-14 DIAGNOSIS — M81.0 OSTEOPOROSIS WITHOUT CURRENT PATHOLOGICAL FRACTURE, UNSPECIFIED OSTEOPOROSIS TYPE: Primary | ICD-10-CM

## 2023-08-14 DIAGNOSIS — G89.29 OTHER CHRONIC PAIN: ICD-10-CM

## 2023-08-14 DIAGNOSIS — E10.42 TYPE 1 DIABETES MELLITUS WITH DIABETIC POLYNEUROPATHY (HCC): ICD-10-CM

## 2023-08-14 PROCEDURE — 3078F DIAST BP <80 MM HG: CPT | Performed by: INTERNAL MEDICINE

## 2023-08-14 PROCEDURE — 1123F ACP DISCUSS/DSCN MKR DOCD: CPT | Performed by: INTERNAL MEDICINE

## 2023-08-14 PROCEDURE — 3044F HG A1C LEVEL LT 7.0%: CPT | Performed by: INTERNAL MEDICINE

## 2023-08-14 PROCEDURE — 99214 OFFICE O/P EST MOD 30 MIN: CPT | Performed by: INTERNAL MEDICINE

## 2023-08-14 PROCEDURE — 3074F SYST BP LT 130 MM HG: CPT | Performed by: INTERNAL MEDICINE

## 2023-08-14 RX ORDER — HYDROCODONE BITARTRATE AND ACETAMINOPHEN 10; 325 MG/1; MG/1
1 TABLET ORAL EVERY 6 HOURS PRN
Qty: 120 TABLET | Refills: 0 | Status: SHIPPED | OUTPATIENT
Start: 2023-10-13 | End: 2023-11-12

## 2023-08-14 RX ORDER — HYDROCODONE BITARTRATE AND ACETAMINOPHEN 10; 325 MG/1; MG/1
1 TABLET ORAL EVERY 6 HOURS PRN
Qty: 120 TABLET | Refills: 0 | Status: SHIPPED | OUTPATIENT
Start: 2023-08-14 | End: 2023-09-13

## 2023-08-14 RX ORDER — HYDROCODONE BITARTRATE AND ACETAMINOPHEN 10; 325 MG/1; MG/1
1 TABLET ORAL EVERY 6 HOURS PRN
Qty: 120 TABLET | Refills: 0 | Status: SHIPPED | OUTPATIENT
Start: 2023-09-13 | End: 2023-10-13

## 2023-08-14 RX ORDER — PEN NEEDLE, DIABETIC 31 GX5/16"
NEEDLE, DISPOSABLE MISCELLANEOUS
Qty: 200 EACH | Refills: 3 | Status: SHIPPED | OUTPATIENT
Start: 2023-08-14

## 2023-08-14 RX ORDER — ALENDRONATE SODIUM 70 MG/1
70 TABLET ORAL
Qty: 12 TABLET | Refills: 1 | Status: SHIPPED | OUTPATIENT
Start: 2023-08-14

## 2023-08-14 RX ORDER — HYDROCODONE BITARTRATE AND ACETAMINOPHEN 10; 325 MG/1; MG/1
1 TABLET ORAL EVERY 6 HOURS PRN
COMMUNITY
End: 2023-08-14 | Stop reason: SDUPTHER

## 2023-08-17 ASSESSMENT — ENCOUNTER SYMPTOMS
BACK PAIN: 1
SHORTNESS OF BREATH: 0
CHEST TIGHTNESS: 0

## 2023-08-17 NOTE — PROGRESS NOTES
Future  -     CBC with Auto Differential; Future  -     TSH; Future  -     Vitamin D 25 Hydroxy; Future    Stage 3 chronic kidney disease, unspecified whether stage 3a or 3b CKD (HCC)  -     Hemoglobin A1C; Future  -     Comprehensive Metabolic Panel; Future  -     Microalbumin / Creatinine Urine Ratio; Future  -     Lipid Panel; Future  -     CBC with Auto Differential; Future  -     TSH; Future  -     Vitamin D 25 Hydroxy;  Future                 [place] Scale, DO

## 2023-09-12 ENCOUNTER — OFFICE VISIT (OUTPATIENT)
Dept: ENDOCRINOLOGY | Age: 68
End: 2023-09-12

## 2023-09-12 VITALS
HEART RATE: 83 BPM | BODY MASS INDEX: 23.99 KG/M2 | WEIGHT: 157.8 LBS | SYSTOLIC BLOOD PRESSURE: 112 MMHG | OXYGEN SATURATION: 96 % | DIASTOLIC BLOOD PRESSURE: 59 MMHG

## 2023-09-12 DIAGNOSIS — E10.65 TYPE 1 DIABETES MELLITUS WITH HYPERGLYCEMIA (HCC): Primary | ICD-10-CM

## 2023-09-12 DIAGNOSIS — I10 ESSENTIAL HYPERTENSION: ICD-10-CM

## 2023-09-12 DIAGNOSIS — E89.0 POST-SURGICAL HYPOTHYROIDISM: ICD-10-CM

## 2023-09-12 DIAGNOSIS — Z86.39 HISTORY OF GRAVES' DISEASE: ICD-10-CM

## 2023-09-12 DIAGNOSIS — Z86.39 HISTORY OF HYPOGLYCEMIA: ICD-10-CM

## 2023-09-12 LAB — HBA1C MFR BLD: 5.6 %

## 2023-09-12 RX ORDER — INSULIN GLARGINE 100 [IU]/ML
INJECTION, SOLUTION SUBCUTANEOUS
Qty: 27 ML | Refills: 4
Start: 2023-09-12

## 2023-09-12 NOTE — PROGRESS NOTES
[facility]   [facility]   [facility]    Luly Avilez PA-C  [address] [facility]  [address] [facility], [address], [address] [place] [address] Rd,[address]  Phone [phone]  Facsimile [phone]          Gabriela Gaytan is a 75 y.o. female seen 9/12/2023 for follow up evaluation of of type 1 diabetes on MDI with CGM            Assessment and Plan:            Interpretation of 72 hour glucose monitor: At least 72 hours of data were reviewed. The patient utilizes a dexcom G6 continuous glucose monitoring system. The average glucose during the reviewed timeframe was 113 with a standard deviation of 38. There is a pattern of frequent overnight and intermittant postprandial hypoglycemia. 1. Type 1 diabetes mellitus with hyperglycemia (HCC)  Glycemic control is suboptimal with greater than 10% hypoglycemia. I maintain that patient is over basal lysed. In the past she was not open to down titrating her basal insulin however after changing from NovoLog to Humalog she recognizes that she needs more prandial insulin and has slightly uptitrated her prandial insulin. She is experiencing more hypoglycemia in the usual both overnight and between meals. Decrease lantus from 30 units down to 28 units to start. I have asked her to focus on down titrating her Lantus until hypoglycemia resolves and fasting blood glucose 80-1 25. Patient is interested in upgrading her CGM to the [address] and is not interested in insulin pump therapy. We will appeal to DME for upgrade    Risk of hypoglycemia outweighs benefit of tight glycemic control.   Patient declines need for refill of glucagon    I encouraged her to participate in diabetes education and although she declines referral she does take contact information so she can self arrange    - AMB POC HEMOGLOBIN A1C  - LANTUS SOLOSTAR 100 UNIT/ML injection pen; Start 28 units and decrease by 1 unit every week until FBG  without

## 2023-09-19 ENCOUNTER — TELEPHONE (OUTPATIENT)
Dept: ENDOCRINOLOGY | Age: 68
End: 2023-09-19

## 2023-11-10 ENCOUNTER — TELEPHONE (OUTPATIENT)
Dept: ENDOCRINOLOGY | Age: 68
End: 2023-11-10

## 2023-11-10 NOTE — TELEPHONE ENCOUNTER
11/1/23 Received fax from [employer] that patients enrollment ends 12/31/23, and was sent a letter regarding re-enrolling.    11/10 - placed copy of letter and forms in mail to address on file for 2024 re-enrollment.  Will a note to send to Dr. Todd Charles's office since he does her [employer] Patient Assistance.

## 2023-11-14 ENCOUNTER — NURSE ONLY (OUTPATIENT)
Dept: INTERNAL MEDICINE CLINIC | Facility: CLINIC | Age: 68
End: 2023-11-14

## 2023-11-14 DIAGNOSIS — N18.30 STAGE 3 CHRONIC KIDNEY DISEASE, UNSPECIFIED WHETHER STAGE 3A OR 3B CKD (HCC): ICD-10-CM

## 2023-11-14 DIAGNOSIS — E10.42 TYPE 1 DIABETES MELLITUS WITH DIABETIC POLYNEUROPATHY (HCC): ICD-10-CM

## 2023-11-14 DIAGNOSIS — M81.0 OSTEOPOROSIS WITHOUT CURRENT PATHOLOGICAL FRACTURE, UNSPECIFIED OSTEOPOROSIS TYPE: ICD-10-CM

## 2023-11-14 LAB
ALBUMIN SERPL-MCNC: 4 G/DL (ref 3.2–4.6)
ALBUMIN/GLOB SERPL: 1.2 (ref 0.4–1.6)
ALP SERPL-CCNC: 66 U/L (ref 50–136)
ALT SERPL-CCNC: 26 U/L (ref 12–65)
ANION GAP SERPL CALC-SCNC: 6 MMOL/L (ref 2–11)
AST SERPL-CCNC: 20 U/L (ref 15–37)
BASOPHILS # BLD: 0 K/UL (ref 0–0.2)
BASOPHILS NFR BLD: 1 % (ref 0–2)
BILIRUB SERPL-MCNC: 0.4 MG/DL (ref 0.2–1.1)
BUN SERPL-MCNC: 16 MG/DL (ref 8–23)
CALCIUM SERPL-MCNC: 9.3 MG/DL (ref 8.3–10.4)
CHLORIDE SERPL-SCNC: 106 MMOL/L (ref 101–110)
CHOLEST SERPL-MCNC: 248 MG/DL
CO2 SERPL-SCNC: 28 MMOL/L (ref 21–32)
CREAT SERPL-MCNC: 0.8 MG/DL (ref 0.6–1)
CREAT UR-MCNC: 19 MG/DL
DIFFERENTIAL METHOD BLD: NORMAL
EOSINOPHIL # BLD: 0.1 K/UL (ref 0–0.8)
EOSINOPHIL NFR BLD: 2 % (ref 0.5–7.8)
ERYTHROCYTE [DISTWIDTH] IN BLOOD BY AUTOMATED COUNT: 13.1 % (ref 11.9–14.6)
GLOBULIN SER CALC-MCNC: 3.4 G/DL (ref 2.8–4.5)
GLUCOSE SERPL-MCNC: 97 MG/DL (ref 65–100)
HCT VFR BLD AUTO: 41.1 % (ref 35.8–46.3)
HDLC SERPL-MCNC: 90 MG/DL (ref 40–60)
HDLC SERPL: 2.8
HGB BLD-MCNC: 13.3 G/DL (ref 11.7–15.4)
IMM GRANULOCYTES # BLD AUTO: 0 K/UL (ref 0–0.5)
IMM GRANULOCYTES NFR BLD AUTO: 0 % (ref 0–5)
LDLC SERPL CALC-MCNC: 143.8 MG/DL
LYMPHOCYTES # BLD: 1.4 K/UL (ref 0.5–4.6)
LYMPHOCYTES NFR BLD: 27 % (ref 13–44)
MCH RBC QN AUTO: 30.4 PG (ref 26.1–32.9)
MCHC RBC AUTO-ENTMCNC: 32.4 G/DL (ref 31.4–35)
MCV RBC AUTO: 93.8 FL (ref 82–102)
MICROALBUMIN UR-MCNC: 3.58 MG/DL
MICROALBUMIN/CREAT UR-RTO: 188 MG/G (ref 0–30)
MONOCYTES # BLD: 0.5 K/UL (ref 0.1–1.3)
MONOCYTES NFR BLD: 9 % (ref 4–12)
NEUTS SEG # BLD: 3 K/UL (ref 1.7–8.2)
NEUTS SEG NFR BLD: 61 % (ref 43–78)
NRBC # BLD: 0 K/UL (ref 0–0.2)
PLATELET # BLD AUTO: 352 K/UL (ref 150–450)
PMV BLD AUTO: 9.9 FL (ref 9.4–12.3)
POTASSIUM SERPL-SCNC: 4.5 MMOL/L (ref 3.5–5.1)
PROT SERPL-MCNC: 7.4 G/DL (ref 6.3–8.2)
RBC # BLD AUTO: 4.38 M/UL (ref 4.05–5.2)
SODIUM SERPL-SCNC: 140 MMOL/L (ref 133–143)
TRIGL SERPL-MCNC: 71 MG/DL (ref 35–150)
TSH, 3RD GENERATION: 1.27 UIU/ML (ref 0.36–3.74)
VLDLC SERPL CALC-MCNC: 14.2 MG/DL (ref 6–23)
WBC # BLD AUTO: 5 K/UL (ref 4.3–11.1)

## 2023-11-15 LAB
25(OH)D3 SERPL-MCNC: 72.3 NG/ML (ref 30–100)
EST. AVERAGE GLUCOSE BLD GHB EST-MCNC: 123 MG/DL
HBA1C MFR BLD: 5.9 % (ref 4.8–5.6)

## 2023-11-30 ENCOUNTER — OFFICE VISIT (OUTPATIENT)
Dept: INTERNAL MEDICINE CLINIC | Facility: CLINIC | Age: 68
End: 2023-11-30
Payer: MEDICARE

## 2023-11-30 VITALS
HEART RATE: 90 BPM | SYSTOLIC BLOOD PRESSURE: 132 MMHG | WEIGHT: 158 LBS | TEMPERATURE: 97.5 F | BODY MASS INDEX: 23.95 KG/M2 | DIASTOLIC BLOOD PRESSURE: 60 MMHG | HEIGHT: 68 IN

## 2023-11-30 DIAGNOSIS — E10.42 TYPE 1 DIABETES MELLITUS WITH DIABETIC POLYNEUROPATHY (HCC): ICD-10-CM

## 2023-11-30 DIAGNOSIS — E03.9 ACQUIRED HYPOTHYROIDISM: ICD-10-CM

## 2023-11-30 DIAGNOSIS — G89.29 OTHER CHRONIC PAIN: ICD-10-CM

## 2023-11-30 DIAGNOSIS — M26.622 ARTHRALGIA OF LEFT TEMPOROMANDIBULAR JOINT: Primary | ICD-10-CM

## 2023-11-30 PROCEDURE — 1123F ACP DISCUSS/DSCN MKR DOCD: CPT | Performed by: INTERNAL MEDICINE

## 2023-11-30 PROCEDURE — 3078F DIAST BP <80 MM HG: CPT | Performed by: INTERNAL MEDICINE

## 2023-11-30 PROCEDURE — 3044F HG A1C LEVEL LT 7.0%: CPT | Performed by: INTERNAL MEDICINE

## 2023-11-30 PROCEDURE — 99214 OFFICE O/P EST MOD 30 MIN: CPT | Performed by: INTERNAL MEDICINE

## 2023-11-30 PROCEDURE — 3074F SYST BP LT 130 MM HG: CPT | Performed by: INTERNAL MEDICINE

## 2023-11-30 RX ORDER — HYDROCODONE BITARTRATE AND ACETAMINOPHEN 10; 325 MG/1; MG/1
1 TABLET ORAL EVERY 6 HOURS PRN
Qty: 120 TABLET | Refills: 0 | Status: SHIPPED | OUTPATIENT
Start: 2024-01-29 | End: 2024-02-28

## 2023-11-30 RX ORDER — HYDROCODONE BITARTRATE AND ACETAMINOPHEN 10; 325 MG/1; MG/1
1 TABLET ORAL EVERY 6 HOURS PRN
Qty: 120 TABLET | Refills: 0 | Status: SHIPPED | OUTPATIENT
Start: 2023-11-30 | End: 2023-12-30

## 2023-11-30 RX ORDER — HYDROCODONE BITARTRATE AND ACETAMINOPHEN 10; 325 MG/1; MG/1
1 TABLET ORAL EVERY 6 HOURS PRN
Qty: 120 TABLET | Refills: 0 | Status: SHIPPED | OUTPATIENT
Start: 2023-12-30 | End: 2024-01-29

## 2023-11-30 ASSESSMENT — ENCOUNTER SYMPTOMS
BACK PAIN: 1
SHORTNESS OF BREATH: 0
CHEST TIGHTNESS: 0

## 2023-11-30 NOTE — PROGRESS NOTES
Future  -     ЮЛИЯ, Direct, w/Reflex; Future    Acquired hypothyroidism    Other chronic pain  -     HYDROcodone-acetaminophen (NORCO)  MG per tablet; Take 1 tablet by mouth every 6 hours as needed for Pain for up to 30 days. Max Daily Amount: 4 tablets  -     HYDROcodone-acetaminophen (NORCO)  MG per tablet; Take 1 tablet by mouth every 6 hours as needed for Pain for up to 30 days. Intended supply: 30 days Max Daily Amount: 4 tablets  -     HYDROcodone-acetaminophen (NORCO)  MG per tablet; Take 1 tablet by mouth every 6 hours as needed for Pain for up to 30 days. Intended supply: 30 days Max Daily Amount: 4 tablets  -     Comprehensive Metabolic Panel; Future  -     CCP Antibodies, IGG/IGA; Future  -     Rheumatoid Factor; Future  -     Sedimentation Rate; Future  -     ЮЛИЯ, Direct, w/Reflex; Future    Type 1 diabetes mellitus with diabetic polyneuropathy (HCC)  -     HYDROcodone-acetaminophen (NORCO)  MG per tablet; Take 1 tablet by mouth every 6 hours as needed for Pain for up to 30 days. Max Daily Amount: 4 tablets  -     HYDROcodone-acetaminophen (NORCO)  MG per tablet; Take 1 tablet by mouth every 6 hours as needed for Pain for up to 30 days. Intended supply: 30 days Max Daily Amount: 4 tablets  -     HYDROcodone-acetaminophen (NORCO)  MG per tablet; Take 1 tablet by mouth every 6 hours as needed for Pain for up to 30 days.  Intended supply: 30 days Max Daily Amount: 4 tablets                 Sonia Scale, DO

## 2024-02-09 ENCOUNTER — TELEPHONE (OUTPATIENT)
Dept: ENDOCRINOLOGY | Age: 69
End: 2024-02-09

## 2024-02-22 ENCOUNTER — NURSE ONLY (OUTPATIENT)
Dept: INTERNAL MEDICINE CLINIC | Facility: CLINIC | Age: 69
End: 2024-02-22

## 2024-02-22 DIAGNOSIS — M26.622 ARTHRALGIA OF LEFT TEMPOROMANDIBULAR JOINT: ICD-10-CM

## 2024-02-22 DIAGNOSIS — G89.29 OTHER CHRONIC PAIN: ICD-10-CM

## 2024-02-22 LAB — ERYTHROCYTE [SEDIMENTATION RATE] IN BLOOD: 10 MM/HR (ref 0–30)

## 2024-02-23 LAB
ALBUMIN SERPL-MCNC: 4.1 G/DL (ref 3.2–4.6)
ALBUMIN/GLOB SERPL: 1.2 (ref 0.4–1.6)
ALP SERPL-CCNC: 69 U/L (ref 50–136)
ALT SERPL-CCNC: 26 U/L (ref 12–65)
ANION GAP SERPL CALC-SCNC: 8 MMOL/L (ref 2–11)
AST SERPL-CCNC: 27 U/L (ref 15–37)
BILIRUB SERPL-MCNC: 0.4 MG/DL (ref 0.2–1.1)
BUN SERPL-MCNC: 18 MG/DL (ref 8–23)
CALCIUM SERPL-MCNC: 9.8 MG/DL (ref 8.3–10.4)
CHLORIDE SERPL-SCNC: 109 MMOL/L (ref 103–113)
CO2 SERPL-SCNC: 24 MMOL/L (ref 21–32)
CREAT SERPL-MCNC: 0.7 MG/DL (ref 0.6–1)
GLOBULIN SER CALC-MCNC: 3.3 G/DL (ref 2.8–4.5)
GLUCOSE SERPL-MCNC: 114 MG/DL (ref 65–100)
POTASSIUM SERPL-SCNC: 5.5 MMOL/L (ref 3.5–5.1)
PROT SERPL-MCNC: 7.4 G/DL (ref 6.3–8.2)
RHEUMATOID FACT SER QL LA: NEGATIVE
SODIUM SERPL-SCNC: 141 MMOL/L (ref 136–146)

## 2024-02-24 LAB — ANA SER QL: NEGATIVE

## 2024-02-25 LAB — CCP IGA+IGG SERPL IA-ACNC: 4 UNITS (ref 0–19)

## 2024-02-29 ENCOUNTER — OFFICE VISIT (OUTPATIENT)
Dept: INTERNAL MEDICINE CLINIC | Facility: CLINIC | Age: 69
End: 2024-02-29
Payer: MEDICARE

## 2024-02-29 VITALS
BODY MASS INDEX: 23.64 KG/M2 | WEIGHT: 156 LBS | OXYGEN SATURATION: 96 % | SYSTOLIC BLOOD PRESSURE: 128 MMHG | TEMPERATURE: 97.7 F | HEART RATE: 99 BPM | HEIGHT: 68 IN | DIASTOLIC BLOOD PRESSURE: 60 MMHG

## 2024-02-29 DIAGNOSIS — I10 ESSENTIAL HYPERTENSION: ICD-10-CM

## 2024-02-29 DIAGNOSIS — G89.29 OTHER CHRONIC PAIN: ICD-10-CM

## 2024-02-29 DIAGNOSIS — N18.30 STAGE 3 CHRONIC KIDNEY DISEASE, UNSPECIFIED WHETHER STAGE 3A OR 3B CKD (HCC): ICD-10-CM

## 2024-02-29 DIAGNOSIS — F11.20 OPIOID DEPENDENCE WITH CURRENT USE (HCC): ICD-10-CM

## 2024-02-29 DIAGNOSIS — L84 PRE-ULCERATIVE CORN OR CALLOUS: ICD-10-CM

## 2024-02-29 DIAGNOSIS — E03.9 ACQUIRED HYPOTHYROIDISM: ICD-10-CM

## 2024-02-29 DIAGNOSIS — E10.42 TYPE 1 DIABETES MELLITUS WITH DIABETIC POLYNEUROPATHY (HCC): ICD-10-CM

## 2024-02-29 DIAGNOSIS — G89.4 CHRONIC PAIN SYNDROME: ICD-10-CM

## 2024-02-29 DIAGNOSIS — E10.42 DIABETIC POLYNEUROPATHY ASSOCIATED WITH TYPE 1 DIABETES MELLITUS (HCC): Primary | ICD-10-CM

## 2024-02-29 DIAGNOSIS — R30.0 DYSURIA: ICD-10-CM

## 2024-02-29 PROCEDURE — 99214 OFFICE O/P EST MOD 30 MIN: CPT | Performed by: INTERNAL MEDICINE

## 2024-02-29 PROCEDURE — 1123F ACP DISCUSS/DSCN MKR DOCD: CPT | Performed by: INTERNAL MEDICINE

## 2024-02-29 PROCEDURE — 3078F DIAST BP <80 MM HG: CPT | Performed by: INTERNAL MEDICINE

## 2024-02-29 PROCEDURE — 3074F SYST BP LT 130 MM HG: CPT | Performed by: INTERNAL MEDICINE

## 2024-02-29 RX ORDER — LOSARTAN POTASSIUM 100 MG/1
100 TABLET ORAL DAILY
Qty: 90 TABLET | Refills: 3 | Status: SHIPPED | OUTPATIENT
Start: 2024-02-29

## 2024-02-29 RX ORDER — HYDROCODONE BITARTRATE AND ACETAMINOPHEN 10; 325 MG/1; MG/1
1 TABLET ORAL EVERY 6 HOURS PRN
Qty: 120 TABLET | Refills: 0 | Status: SHIPPED | OUTPATIENT
Start: 2024-02-29 | End: 2024-03-30

## 2024-02-29 RX ORDER — HYDROCODONE BITARTRATE AND ACETAMINOPHEN 10; 325 MG/1; MG/1
1 TABLET ORAL EVERY 6 HOURS PRN
Qty: 120 TABLET | Refills: 0 | Status: SHIPPED | OUTPATIENT
Start: 2024-03-30 | End: 2024-04-29

## 2024-02-29 RX ORDER — PEN NEEDLE, DIABETIC 31 GX5/16"
NEEDLE, DISPOSABLE MISCELLANEOUS
Qty: 200 EACH | Refills: 3 | Status: SHIPPED | OUTPATIENT
Start: 2024-02-29

## 2024-02-29 RX ORDER — LEVOTHYROXINE SODIUM 112 UG/1
112 TABLET ORAL
Qty: 90 TABLET | Refills: 3 | Status: SHIPPED | OUTPATIENT
Start: 2024-02-29

## 2024-02-29 RX ORDER — HYDROCODONE BITARTRATE AND ACETAMINOPHEN 10; 325 MG/1; MG/1
1 TABLET ORAL EVERY 6 HOURS PRN
Qty: 120 TABLET | Refills: 0 | Status: SHIPPED | OUTPATIENT
Start: 2024-04-29 | End: 2024-05-29

## 2024-02-29 ASSESSMENT — PATIENT HEALTH QUESTIONNAIRE - PHQ9
SUM OF ALL RESPONSES TO PHQ QUESTIONS 1-9: 0
1. LITTLE INTEREST OR PLEASURE IN DOING THINGS: 0
SUM OF ALL RESPONSES TO PHQ QUESTIONS 1-9: 0
SUM OF ALL RESPONSES TO PHQ QUESTIONS 1-9: 0
SUM OF ALL RESPONSES TO PHQ9 QUESTIONS 1 & 2: 0
2. FEELING DOWN, DEPRESSED OR HOPELESS: 0
SUM OF ALL RESPONSES TO PHQ QUESTIONS 1-9: 0

## 2024-02-29 ASSESSMENT — LIFESTYLE VARIABLES: HOW MANY STANDARD DRINKS CONTAINING ALCOHOL DO YOU HAVE ON A TYPICAL DAY: PATIENT DOES NOT DRINK

## 2024-02-29 NOTE — PROGRESS NOTES
Amount: 4 tablets Yes Todd Charles DO   levothyroxine (SYNTHROID) 112 MCG tablet Take 1 tablet by mouth every morning (before breakfast) Yes Todd Charles DO   losartan (COZAAR) 100 MG tablet Take 1 tablet by mouth daily Yes Todd Charles DO   B-D ULTRAFINE III SHORT PEN 31G X 8 MM MISC USE 1 PEN NEEDLE TO INJECT INSULIN FIVE TIMES DAILY  DX E10.42  NPI [id number] Yes Todd Charles DO   mirabegron (MYRBETRIQ) 25 MG TB24 Take 1 tablet by mouth daily Yes Todd Charles DO   NONFORMULARY Take 1 capsule by mouth daily Benfothiamne 160 mg Yes Provider, MD Krystina   LANTUS SOLOSTAR 100 UNIT/ML injection pen Start 28 units and decrease by 1 unit every week until FBG  without hypoglycemia, max daily dose 28 Yes Елена Valdez PA-C   alendronate (FOSAMAX) 70 MG tablet Take 1 tablet by mouth every 7 days Yes Todd Charles DO   HUMALOG KWIKPEN 100 UNIT/ML SOPN Inject into the skin 2 units before meals plus correction scale 2/50>200, max daily dose 30 units Yes Елена Valdez PA-C   Methylcobalamin (METHYL B-12 PO) Take 1,000 mcg by mouth daily. Yes Provider, MD Krystina   Contact Lens Care Products (THERATEARS) MISC Take 1 tablet by mouth 3 times daily Yes Provider, MD Krystina   Alpha-Lipoic Acid 600 MG CAPS Take 1 tablet by mouth 2 times daily Yes Automatic Reconciliation, Ar   Calcium Carbonate-Vitamin D (CALCIUM-VITAMIN D) 600-125 MG-UNIT TABS Take 1 tablet by mouth 2 times daily Yes Automatic Reconciliation, Ar   Glucagon (GVOKE HYPOPEN 2-PACK) 1 MG/0.2ML SOAJ Inject 1 mg into the skin as needed Yes Automatic Reconciliation, Ar   Lutein-Zeaxanthin 20-1 MG CAPS Take 25 mg by mouth daily Yes Automatic Reconciliation, Ar   nystatin (MYCOSTATIN) 230881 UNIT/GM powder Apply topically 4 times daily Yes Automatic Reconciliation, Ar   ondansetron (ZOFRAN-ODT) 8 MG TBDP disintegrating tablet Take 1 tablet by mouth every 8 hours as needed for Nausea or Vomiting Yes Automatic Reconciliation,

## 2024-03-07 ENCOUNTER — TRANSCRIBE ORDERS (OUTPATIENT)
Dept: SCHEDULING | Age: 69
End: 2024-03-07

## 2024-03-07 DIAGNOSIS — Z12.31 SCREENING MAMMOGRAM FOR HIGH-RISK PATIENT: Primary | ICD-10-CM

## 2024-03-12 ENCOUNTER — OFFICE VISIT (OUTPATIENT)
Dept: ENDOCRINOLOGY | Age: 69
End: 2024-03-12
Payer: MEDICARE

## 2024-03-12 VITALS
DIASTOLIC BLOOD PRESSURE: 64 MMHG | OXYGEN SATURATION: 98 % | HEART RATE: 86 BPM | WEIGHT: 158.8 LBS | BODY MASS INDEX: 24.15 KG/M2 | SYSTOLIC BLOOD PRESSURE: 128 MMHG

## 2024-03-12 DIAGNOSIS — E89.0 POST-SURGICAL HYPOTHYROIDISM: ICD-10-CM

## 2024-03-12 DIAGNOSIS — E10.65 TYPE 1 DIABETES MELLITUS WITH HYPERGLYCEMIA (HCC): Primary | ICD-10-CM

## 2024-03-12 DIAGNOSIS — Z86.39 HISTORY OF HYPOGLYCEMIA: ICD-10-CM

## 2024-03-12 DIAGNOSIS — Z86.39 HISTORY OF GRAVES' DISEASE: ICD-10-CM

## 2024-03-12 DIAGNOSIS — I10 ESSENTIAL HYPERTENSION: ICD-10-CM

## 2024-03-12 LAB — HBA1C MFR BLD: 6 %

## 2024-03-12 PROCEDURE — 1123F ACP DISCUSS/DSCN MKR DOCD: CPT | Performed by: PHYSICIAN ASSISTANT

## 2024-03-12 PROCEDURE — 83036 HEMOGLOBIN GLYCOSYLATED A1C: CPT | Performed by: PHYSICIAN ASSISTANT

## 2024-03-12 PROCEDURE — 95251 CONT GLUC MNTR ANALYSIS I&R: CPT | Performed by: PHYSICIAN ASSISTANT

## 2024-03-12 PROCEDURE — 99214 OFFICE O/P EST MOD 30 MIN: CPT | Performed by: PHYSICIAN ASSISTANT

## 2024-03-12 PROCEDURE — 3074F SYST BP LT 130 MM HG: CPT | Performed by: PHYSICIAN ASSISTANT

## 2024-03-12 PROCEDURE — 3078F DIAST BP <80 MM HG: CPT | Performed by: PHYSICIAN ASSISTANT

## 2024-03-12 RX ORDER — MAGNESIUM GLUCONATE 27 MG(500)
500 TABLET ORAL 2 TIMES DAILY
COMMUNITY

## 2024-03-12 RX ORDER — BIOTIN 10 MG
TABLET ORAL
COMMUNITY

## 2024-03-12 NOTE — PROGRESS NOTES
[facility] ENDOCRINOLOGY   AND   THYROID NODULE CLINIC    Елена Valdez PA-C  [facility] End[facility]in[facility]  [address] [place] [address], [address] [address]  Phone [phone]  Facsimile [phone]          Fariba Blum is a 68 y.o. female seen 3/12/2024 for follow up evaluation of of type 1 diabetes on MDI with CGM            Assessment and Plan:            Interpretation of 72 hour glucose monitor:   At least 72 hours of data were reviewed.  The patient utilizes a dexcom G6 continuous glucose monitoring system.  The average glucose during the reviewed timeframe was 117 with a standard deviation of 40.  There is a pattern of frequent overnight and intermittant postprandial hypoglycemia.       1. Type 1 diabetes mellitus with hyperglycemia (HCC)  Glycemic control is suboptimal with 10% hypoglycemia.  I maintain that patient is over basal lysed.  In the past she was not open to down titrating her basal insulin however after changing from NovoLog to Humalog she recognizes that she needs more prandial insulin and has slightly uptitrated her prandial insulin.      Patient is interested in upgrading her CGM to the Dexcom G7 and is not interested in insulin pump therapy.     Risk of hypoglycemia outweighs benefit of tight glycemic control.  Patient declines need for refill of glucagon    I encouraged her to participate in diabetes education    - AMB POC HEMOGLOBIN A1C  - ND CONTINUOUS GLUCOSE MONITORING ANALYSIS I&R  -  DIABETES FOOT EXAM    2. History of hypoglycemia  10% hypoglycemia, benefiting from CGM therapy with early warning of hypoglycemia  Risk of hypoglycemia outweighs benefit of tight glycemic control.  Patient declines need for refill of glucagon    3. Post-surgical hypothyroidism  Patient is biochemically euthyroid on 112 mcg of generic levothyroxine which is being taken correctly    4. History of Graves' disease  Previously advised, that diagnosis of one autoimmune

## 2024-04-11 DIAGNOSIS — E10.65 TYPE 1 DIABETES MELLITUS WITH HYPERGLYCEMIA (HCC): ICD-10-CM

## 2024-04-11 RX ORDER — INSULIN GLARGINE 100 [IU]/ML
INJECTION, SOLUTION SUBCUTANEOUS
Qty: 10 ADJUSTABLE DOSE PRE-FILLED PEN SYRINGE | Refills: 11 | Status: SHIPPED | OUTPATIENT
Start: 2024-04-11

## 2024-04-18 ENCOUNTER — TELEPHONE (OUTPATIENT)
Dept: ENDOCRINOLOGY | Age: 69
End: 2024-04-18

## 2024-04-18 NOTE — TELEPHONE ENCOUNTER
Patient was started on Dexcom G7 at last visit, but told you that she may have trouble inserting it herself because of her vision changes. That is the case. She would like to go back to Dexcom G6.

## 2024-04-19 NOTE — TELEPHONE ENCOUNTER
This is through medicare, please submit to DME to Return to G6 secondary to difficulty using G7 due to vision impairment

## 2024-04-19 NOTE — TELEPHONE ENCOUNTER
Spoke with patient. She states taking Humalog its taking 2 hours for it to take affect on her sugars. She took 6 units at 1:30pm and could not eat until 3:45  because it took that long for the sugars to come back down.

## 2024-04-22 NOTE — TELEPHONE ENCOUNTER
Pt does not appear to have a follow up visit scheduled. Please schedule 3-4 month follow up from last visit    Please remind patient that I feel she is taking more lantus that her body needs which limits how much humalog/novolog she can take. I'm happy to help her lower the lantus and balance out the humalog use.     While novolog is not covered, Fiasp appears covered. Fiasp is closer in formulation to novolog. I can send a trial of Fiasp if pt is interested in trying something different

## 2024-04-22 NOTE — TELEPHONE ENCOUNTER
Infection is the likely cause of her current insulin resistance. Recommend treat the UTI and re-evaluate

## 2024-04-22 NOTE — TELEPHONE ENCOUNTER
Spoke with patient. She does not want to change insulin. She is going to PCP for a UTI.    She would like to know what insulin resistance? She states she has never had this issue with stabilizing her sugars.

## 2024-04-22 NOTE — TELEPHONE ENCOUNTER
Submitted DME for Dexcom G6 to [facility]. Patient is visually impared and cannot use the Dexcom G7.

## 2024-04-23 ENCOUNTER — TELEPHONE (OUTPATIENT)
Dept: ENDOCRINOLOGY | Age: 69
End: 2024-04-23

## 2024-04-23 ENCOUNTER — OFFICE VISIT (OUTPATIENT)
Dept: INTERNAL MEDICINE CLINIC | Facility: CLINIC | Age: 69
End: 2024-04-23
Payer: MEDICARE

## 2024-04-23 VITALS
HEIGHT: 68 IN | TEMPERATURE: 97.6 F | DIASTOLIC BLOOD PRESSURE: 70 MMHG | BODY MASS INDEX: 22.58 KG/M2 | OXYGEN SATURATION: 97 % | SYSTOLIC BLOOD PRESSURE: 130 MMHG | WEIGHT: 149 LBS | HEART RATE: 81 BPM

## 2024-04-23 DIAGNOSIS — R30.9 URINARY PAIN: ICD-10-CM

## 2024-04-23 DIAGNOSIS — N30.01 ACUTE CYSTITIS WITH HEMATURIA: Primary | ICD-10-CM

## 2024-04-23 DIAGNOSIS — E10.65 TYPE 1 DIABETES MELLITUS WITH HYPERGLYCEMIA (HCC): Primary | ICD-10-CM

## 2024-04-23 DIAGNOSIS — R63.4 WEIGHT LOSS: ICD-10-CM

## 2024-04-23 LAB
BILIRUBIN, URINE, POC: NEGATIVE
BLOOD URINE, POC: ABNORMAL
GLUCOSE URINE, POC: NEGATIVE
KETONES, URINE, POC: NEGATIVE
LEUKOCYTE ESTERASE, URINE, POC: ABNORMAL
NITRITE, URINE, POC: ABNORMAL
PH, URINE, POC: 6 (ref 4.6–8)
PROTEIN,URINE, POC: NEGATIVE
SPECIFIC GRAVITY, URINE, POC: 1.01 (ref 1–1.03)
URINALYSIS CLARITY, POC: CLEAR
URINALYSIS COLOR, POC: ABNORMAL
UROBILINOGEN, POC: ABNORMAL

## 2024-04-23 PROCEDURE — 3078F DIAST BP <80 MM HG: CPT | Performed by: NURSE PRACTITIONER

## 2024-04-23 PROCEDURE — 99213 OFFICE O/P EST LOW 20 MIN: CPT | Performed by: NURSE PRACTITIONER

## 2024-04-23 PROCEDURE — 81002 URINALYSIS NONAUTO W/O SCOPE: CPT | Performed by: NURSE PRACTITIONER

## 2024-04-23 PROCEDURE — 1123F ACP DISCUSS/DSCN MKR DOCD: CPT | Performed by: NURSE PRACTITIONER

## 2024-04-23 PROCEDURE — 3075F SYST BP GE 130 - 139MM HG: CPT | Performed by: NURSE PRACTITIONER

## 2024-04-23 RX ORDER — INSULIN ASPART INJECTION 100 [IU]/ML
INJECTION, SOLUTION SUBCUTANEOUS
Qty: 15 ML | Refills: 5 | Status: SHIPPED | OUTPATIENT
Start: 2024-04-23

## 2024-04-23 RX ORDER — CEPHALEXIN 500 MG/1
500 CAPSULE ORAL 2 TIMES DAILY
Qty: 10 CAPSULE | Refills: 0 | Status: SHIPPED | OUTPATIENT
Start: 2024-04-23 | End: 2024-04-28

## 2024-04-23 SDOH — ECONOMIC STABILITY: INCOME INSECURITY: HOW HARD IS IT FOR YOU TO PAY FOR THE VERY BASICS LIKE FOOD, HOUSING, MEDICAL CARE, AND HEATING?: NOT VERY HARD

## 2024-04-23 SDOH — ECONOMIC STABILITY: FOOD INSECURITY: WITHIN THE PAST 12 MONTHS, YOU WORRIED THAT YOUR FOOD WOULD RUN OUT BEFORE YOU GOT MONEY TO BUY MORE.: NEVER TRUE

## 2024-04-23 SDOH — ECONOMIC STABILITY: FOOD INSECURITY: WITHIN THE PAST 12 MONTHS, THE FOOD YOU BOUGHT JUST DIDN'T LAST AND YOU DIDN'T HAVE MONEY TO GET MORE.: NEVER TRUE

## 2024-04-23 NOTE — PROGRESS NOTES
An electronic signature was used to authenticate this note.    --Adrienne Arechiga, APRN - CNP

## 2024-04-23 NOTE — TELEPHONE ENCOUNTER
Received fax requesting a prescription for patient but had no name on the request but was attached to [employer] Patient Assistance.  I called patient to see if she knew where it came from and she said [facility]. I asked if she was okay with picking her medication up at our office because they will deliver it here and I know that her previous medication Lantus she could not  at our office and had Dr. Charles do her assistance for her. She said that she did not drive but that she would reach out to Dr. Charles and see if he would do this one also and call me by Friday to let me know. Once I hear from her we will do the paperwork accordingly.

## 2024-04-23 NOTE — TELEPHONE ENCOUNTER
Phone call from patient stating she is willing to try Fiasp. Please send in. In her message she said free, but I never told her that.

## 2024-04-24 ENCOUNTER — TELEPHONE (OUTPATIENT)
Dept: ENDOCRINOLOGY | Age: 69
End: 2024-04-24

## 2024-04-24 NOTE — TELEPHONE ENCOUNTER
Phone call from DME stating insurance is requiring medical necessity letter as to why patient is going back to G6. I will then fax to them.

## 2024-04-25 ENCOUNTER — TELEPHONE (OUTPATIENT)
Dept: INTERNAL MEDICINE CLINIC | Facility: CLINIC | Age: 69
End: 2024-04-25

## 2024-04-26 LAB
BACTERIA SPEC CULT: NORMAL
SERVICE CMNT-IMP: NORMAL

## 2024-05-13 ENCOUNTER — TELEPHONE (OUTPATIENT)
Dept: INTERNAL MEDICINE CLINIC | Facility: CLINIC | Age: 69
End: 2024-05-13

## 2024-05-13 NOTE — TELEPHONE ENCOUNTER
Called the patient we have not received any paperwork from [employer]. She said she would call them tomorrow she did confirm our fax number.

## 2024-05-13 NOTE — TELEPHONE ENCOUNTER
Patient called and would like to know if you would give her a call back in regards to some paperwork for her insulin. Please Advise.

## 2024-05-14 ENCOUNTER — TELEPHONE (OUTPATIENT)
Dept: INTERNAL MEDICINE CLINIC | Facility: CLINIC | Age: 69
End: 2024-05-14

## 2024-05-14 ENCOUNTER — OFFICE VISIT (OUTPATIENT)
Dept: UROLOGY | Age: 69
End: 2024-05-14
Payer: MEDICARE

## 2024-05-14 DIAGNOSIS — E10.65 TYPE 1 DIABETES MELLITUS WITH HYPERGLYCEMIA (HCC): ICD-10-CM

## 2024-05-14 DIAGNOSIS — N32.81 OAB (OVERACTIVE BLADDER): Primary | ICD-10-CM

## 2024-05-14 DIAGNOSIS — R30.0 DYSURIA: ICD-10-CM

## 2024-05-14 LAB
BILIRUBIN, URINE, POC: NEGATIVE
BLOOD URINE, POC: NEGATIVE
GLUCOSE URINE, POC: NEGATIVE
KETONES, URINE, POC: NEGATIVE
LEUKOCYTE ESTERASE, URINE, POC: NEGATIVE
NITRITE, URINE, POC: NEGATIVE
PH, URINE, POC: 6.5 (ref 4.6–8)
PROTEIN,URINE, POC: NEGATIVE
SPECIFIC GRAVITY, URINE, POC: 1.01 (ref 1–1.03)
URINALYSIS CLARITY, POC: NORMAL
URINALYSIS COLOR, POC: NORMAL
UROBILINOGEN, POC: NORMAL

## 2024-05-14 PROCEDURE — 1123F ACP DISCUSS/DSCN MKR DOCD: CPT | Performed by: NURSE PRACTITIONER

## 2024-05-14 PROCEDURE — 99214 OFFICE O/P EST MOD 30 MIN: CPT | Performed by: NURSE PRACTITIONER

## 2024-05-14 PROCEDURE — 81003 URINALYSIS AUTO W/O SCOPE: CPT | Performed by: NURSE PRACTITIONER

## 2024-05-14 RX ORDER — TROSPIUM CHLORIDE 20 MG/1
20 TABLET, FILM COATED ORAL 2 TIMES DAILY
Qty: 60 TABLET | Refills: 3 | Status: SHIPPED | OUTPATIENT
Start: 2024-05-14

## 2024-05-14 RX ORDER — INSULIN ASPART INJECTION 100 [IU]/ML
INJECTION, SOLUTION SUBCUTANEOUS
Qty: 15 ML | Refills: 11 | Status: SHIPPED | OUTPATIENT
Start: 2024-05-14

## 2024-05-14 ASSESSMENT — ENCOUNTER SYMPTOMS: BACK PAIN: 0

## 2024-05-14 NOTE — PROGRESS NOTES
by mouth daily Benfothiamne 160 mg      alendronate (FOSAMAX) 70 MG tablet Take 1 tablet by mouth every 7 days 12 tablet 1    HUMALOG KWIKPEN 100 UNIT/ML SOPN Inject into the skin 2 units before meals plus correction scale 2/50>200, max daily dose 30 units 30 mL 3    Methylcobalamin (METHYL B-12 PO) Take 1,000 mcg by mouth daily.      Contact Lens Care Products (THERATEARS) MISC Take 1 tablet by mouth 3 times daily      Alpha-Lipoic Acid 600 MG CAPS Take 1 tablet by mouth 2 times daily      Calcium Carbonate-Vitamin D (CALCIUM-VITAMIN D) 600-125 MG-UNIT TABS Take 1 tablet by mouth 2 times daily      Glucagon (GVOKE HYPOPEN 2-PACK) 1 MG/0.2ML SOAJ Inject 1 mg into the skin as needed      Lutein-Zeaxanthin 20-1 MG CAPS Take 25 mg by mouth daily      nystatin (MYCOSTATIN) 766111 UNIT/GM powder Apply topically 4 times daily      ondansetron (ZOFRAN-ODT) 8 MG TBDP disintegrating tablet Take 1 tablet by mouth every 8 hours as needed for Nausea or Vomiting      senna-docusate (PERICOLACE) 8.6-50 MG per tablet Take 1 tablet by mouth daily as needed       No current facility-administered medications for this visit.     Allergies   Allergen Reactions    Atorvastatin Myalgia    Ketoconazole Itching     2% Shampoo-     Lisinopril Other (See Comments)    Mirabegron Hives    Buspirone Palpitations     Social History     Socioeconomic History    Marital status:      Spouse name: Not on file    Number of children: Not on file    Years of education: Not on file    Highest education level: Not on file   Occupational History    Not on file   Tobacco Use    Smoking status: Never    Smokeless tobacco: Never   Substance and Sexual Activity    Alcohol use: No    Drug use: Yes     Types: OTC, Prescription    Sexual activity: Not on file   Other Topics Concern    Not on file   Social History Narrative    Not on file     Social Determinants of Health     Financial Resource Strain: Low Risk  (4/23/2024)    Overall Financial Resource

## 2024-05-15 DIAGNOSIS — R30.0 DYSURIA: ICD-10-CM

## 2024-05-16 ENCOUNTER — TELEPHONE (OUTPATIENT)
Dept: UROLOGY | Age: 69
End: 2024-05-16

## 2024-05-16 NOTE — TELEPHONE ENCOUNTER
I would have her discuss medication with her eye specialist. They will be able to decide if the medication is OK or not.    If it is not, then I would recommend we try to get the Myrbetriq approved and cost lowered with her insurance.    Abby Del Toro, APRN - CNP

## 2024-05-16 NOTE — TELEPHONE ENCOUNTER
Pt called stating that she has common form of Glaucoma, and the papers that came with the medicine when she picked it up stated that it could possibly make it worse. So, she wanted you to look into it and do some research before she takes it.

## 2024-05-20 LAB
BACTERIA SPEC CULT: ABNORMAL
BACTERIA SPEC CULT: ABNORMAL
SERVICE CMNT-IMP: ABNORMAL

## 2024-05-30 ENCOUNTER — OFFICE VISIT (OUTPATIENT)
Dept: INTERNAL MEDICINE CLINIC | Facility: CLINIC | Age: 69
End: 2024-05-30
Payer: MEDICARE

## 2024-05-30 VITALS
TEMPERATURE: 97.4 F | SYSTOLIC BLOOD PRESSURE: 148 MMHG | DIASTOLIC BLOOD PRESSURE: 60 MMHG | HEART RATE: 87 BPM | OXYGEN SATURATION: 95 % | WEIGHT: 155 LBS | HEIGHT: 68 IN | BODY MASS INDEX: 23.49 KG/M2

## 2024-05-30 DIAGNOSIS — I10 ESSENTIAL HYPERTENSION: ICD-10-CM

## 2024-05-30 DIAGNOSIS — G89.4 CHRONIC PAIN SYNDROME: ICD-10-CM

## 2024-05-30 DIAGNOSIS — E10.42 DIABETIC POLYNEUROPATHY ASSOCIATED WITH TYPE 1 DIABETES MELLITUS (HCC): Primary | ICD-10-CM

## 2024-05-30 DIAGNOSIS — G89.29 OTHER CHRONIC PAIN: ICD-10-CM

## 2024-05-30 DIAGNOSIS — E03.9 ACQUIRED HYPOTHYROIDISM: ICD-10-CM

## 2024-05-30 PROCEDURE — 3078F DIAST BP <80 MM HG: CPT | Performed by: INTERNAL MEDICINE

## 2024-05-30 PROCEDURE — 99214 OFFICE O/P EST MOD 30 MIN: CPT | Performed by: INTERNAL MEDICINE

## 2024-05-30 PROCEDURE — 1123F ACP DISCUSS/DSCN MKR DOCD: CPT | Performed by: INTERNAL MEDICINE

## 2024-05-30 PROCEDURE — 3077F SYST BP >= 140 MM HG: CPT | Performed by: INTERNAL MEDICINE

## 2024-05-30 RX ORDER — HYDROCODONE BITARTRATE AND ACETAMINOPHEN 10; 325 MG/1; MG/1
1 TABLET ORAL EVERY 6 HOURS PRN
Qty: 120 TABLET | Refills: 0 | Status: SHIPPED | OUTPATIENT
Start: 2024-05-30 | End: 2024-06-29

## 2024-05-30 RX ORDER — HYDROCODONE BITARTRATE AND ACETAMINOPHEN 10; 325 MG/1; MG/1
1 TABLET ORAL EVERY 6 HOURS PRN
Qty: 120 TABLET | Refills: 0 | Status: SHIPPED | OUTPATIENT
Start: 2024-06-29 | End: 2024-07-29

## 2024-05-30 RX ORDER — HYDROCODONE BITARTRATE AND ACETAMINOPHEN 10; 325 MG/1; MG/1
1 TABLET ORAL EVERY 6 HOURS PRN
COMMUNITY
End: 2024-05-30 | Stop reason: SDUPTHER

## 2024-05-30 RX ORDER — HYDROCODONE BITARTRATE AND ACETAMINOPHEN 10; 325 MG/1; MG/1
1 TABLET ORAL EVERY 6 HOURS PRN
Qty: 120 TABLET | Refills: 0 | Status: SHIPPED | OUTPATIENT
Start: 2024-07-29 | End: 2024-08-28

## 2024-05-30 NOTE — PROGRESS NOTES
Fariba was seen today for follow-up and medication refill.    Diagnoses and all orders for this visit:    Diabetic polyneuropathy associated with type 1 diabetes mellitus (HCC)  -     Hemoglobin A1C; Future  -     Comprehensive Metabolic Panel; Future  -     CBC with Auto Differential; Future  -     Microalbumin / Creatinine Urine Ratio; Future  -     Lipid Panel; Future  -     TSH; Future    Other chronic pain  -     HYDROcodone-acetaminophen (NORCO)  MG per tablet; Take 1 tablet by mouth every 6 hours as needed for Pain for up to 30 days. Max Daily Amount: 4 tablets  -     HYDROcodone-acetaminophen (NORCO)  MG per tablet; Take 1 tablet by mouth every 6 hours as needed for Pain for up to 30 days. Intended supply: 30 days Max Daily Amount: 4 tablets  -     HYDROcodone-acetaminophen (NORCO)  MG per tablet; Take 1 tablet by mouth every 6 hours as needed for Pain for up to 30 days. Intended supply: 30 days Max Daily Amount: 4 tablets  -     [facility]    Chronic pain syndrome  -     HYDROcodone-acetaminophen (NORCO)  MG per tablet; Take 1 tablet by mouth every 6 hours as needed for Pain for up to 30 days. Max Daily Amount: 4 tablets  -     HYDROcodone-acetaminophen (NORCO)  MG per tablet; Take 1 tablet by mouth every 6 hours as needed for Pain for up to 30 days. Intended supply: 30 days Max Daily Amount: 4 tablets  -     HYDROcodone-acetaminophen (NORCO)  MG per tablet; Take 1 tablet by mouth every 6 hours as needed for Pain for up to 30 days. Intended supply: 30 days Max Daily Amount: 4 tablets  -     [facility]    Acquired hypothyroidism  -     Hemoglobin A1C; Future  -     Comprehensive Metabolic Panel; Future  -     CBC with Auto Differential; Future  -     Microalbumin / Creatinine Urine Ratio; Future  -     Lipid Panel; Future  -     TSH; Future    Essential hypertension  -     Hemoglobin A1C; Future  -     Comprehensive Metabolic Panel;

## 2024-06-05 ENCOUNTER — HOME HEALTH ADMISSION (OUTPATIENT)
Dept: HOME HEALTH SERVICES | Facility: HOME HEALTH | Age: 69
End: 2024-06-05

## 2024-06-14 ENCOUNTER — TELEPHONE (OUTPATIENT)
Dept: ENDOCRINOLOGY | Age: 69
End: 2024-06-14

## 2024-06-14 DIAGNOSIS — E10.42 TYPE 1 DIABETES MELLITUS WITH DIABETIC POLYNEUROPATHY (HCC): ICD-10-CM

## 2024-06-14 RX ORDER — PEN NEEDLE, DIABETIC 31 GX5/16"
NEEDLE, DISPOSABLE MISCELLANEOUS
Qty: 500 EACH | Refills: 3 | Status: SHIPPED | OUTPATIENT
Start: 2024-06-14

## 2024-07-09 ENCOUNTER — TELEPHONE (OUTPATIENT)
Dept: ENDOCRINOLOGY | Age: 69
End: 2024-07-09

## 2024-07-09 NOTE — TELEPHONE ENCOUNTER
Received fax from [employer] stating enrollment for Patient Assistance ends 8/29/24, and if patient would like to continue to receive humalog through the program she will need to re-enroll.  7/10/24  Mailed [employer] application and letter to patient's address on file.

## 2024-07-12 RX ORDER — INSULIN LISPRO 100 [IU]/ML
INJECTION, SOLUTION INTRAVENOUS; SUBCUTANEOUS
Qty: 45 ML | Refills: 0 | OUTPATIENT
Start: 2024-07-12

## 2024-07-15 ENCOUNTER — TELEPHONE (OUTPATIENT)
Dept: UROLOGY | Age: 69
End: 2024-07-15

## 2024-07-15 RX ORDER — CEPHALEXIN 500 MG/1
500 CAPSULE ORAL 3 TIMES DAILY
Qty: 21 CAPSULE | Refills: 0 | Status: SHIPPED | OUTPATIENT
Start: 2024-07-15 | End: 2024-07-22

## 2024-07-15 NOTE — TELEPHONE ENCOUNTER
Called spoke with pt, and notified her that the prescription is at her pharmacy. She expressed understanding and she will call us when she is able to get a ride to the office.

## 2024-07-15 NOTE — TELEPHONE ENCOUNTER
Patient called with UTI sx. Unable to get to the office or hospital for urine specimen due to no transportation. Did well after last course of Keflex. New rx sent.

## 2024-07-18 ENCOUNTER — TELEPHONE (OUTPATIENT)
Dept: ENDOCRINOLOGY | Age: 69
End: 2024-07-18

## 2024-07-18 DIAGNOSIS — E10.42 TYPE 1 DIABETES MELLITUS WITH DIABETIC POLYNEUROPATHY (HCC): ICD-10-CM

## 2024-07-18 RX ORDER — PEN NEEDLE, DIABETIC 31 GX5/16"
NEEDLE, DISPOSABLE MISCELLANEOUS
Qty: 500 EACH | Refills: 3 | Status: SHIPPED | OUTPATIENT
Start: 2024-07-18

## 2024-07-29 ENCOUNTER — HOSPITAL ENCOUNTER (OUTPATIENT)
Dept: MAMMOGRAPHY | Age: 69
Discharge: HOME | End: 2024-08-01
Payer: MEDICARE

## 2024-07-29 VITALS — BODY MASS INDEX: 23.95 KG/M2 | WEIGHT: 158 LBS | HEIGHT: 68 IN

## 2024-07-29 DIAGNOSIS — Z12.31 SCREENING MAMMOGRAM FOR HIGH-RISK PATIENT: ICD-10-CM

## 2024-07-29 PROCEDURE — 77063 BREAST TOMOSYNTHESIS BI: CPT

## 2024-08-13 ENCOUNTER — LAB (OUTPATIENT)
Dept: INTERNAL MEDICINE CLINIC | Facility: CLINIC | Age: 69
End: 2024-08-13

## 2024-08-13 DIAGNOSIS — E10.42 DIABETIC POLYNEUROPATHY ASSOCIATED WITH TYPE 1 DIABETES MELLITUS (HCC): ICD-10-CM

## 2024-08-13 DIAGNOSIS — E03.9 ACQUIRED HYPOTHYROIDISM: ICD-10-CM

## 2024-08-13 DIAGNOSIS — I10 ESSENTIAL HYPERTENSION: ICD-10-CM

## 2024-08-13 LAB
ALBUMIN SERPL-MCNC: 4.1 G/DL (ref 3.2–4.6)
ALBUMIN/GLOB SERPL: 1.3 (ref 1–1.9)
ALP SERPL-CCNC: 65 U/L (ref 35–104)
ALT SERPL-CCNC: 20 U/L (ref 12–65)
ANION GAP SERPL CALC-SCNC: 10 MMOL/L (ref 9–18)
AST SERPL-CCNC: 29 U/L (ref 15–37)
BASOPHILS # BLD: 0 K/UL (ref 0–0.2)
BASOPHILS NFR BLD: 1 % (ref 0–2)
BILIRUB SERPL-MCNC: 0.2 MG/DL (ref 0–1.2)
BUN SERPL-MCNC: 21 MG/DL (ref 8–23)
CALCIUM SERPL-MCNC: 9.8 MG/DL (ref 8.8–10.2)
CHLORIDE SERPL-SCNC: 99 MMOL/L (ref 98–107)
CHOLEST SERPL-MCNC: 249 MG/DL (ref 0–200)
CO2 SERPL-SCNC: 26 MMOL/L (ref 20–28)
CREAT SERPL-MCNC: 0.68 MG/DL (ref 0.6–1.1)
CREAT UR-MCNC: 21.2 MG/DL (ref 28–217)
DIFFERENTIAL METHOD BLD: NORMAL
EOSINOPHIL # BLD: 0.1 K/UL (ref 0–0.8)
EOSINOPHIL NFR BLD: 2 % (ref 0.5–7.8)
ERYTHROCYTE [DISTWIDTH] IN BLOOD BY AUTOMATED COUNT: 13.3 % (ref 11.9–14.6)
EST. AVERAGE GLUCOSE BLD GHB EST-MCNC: 132 MG/DL
GLOBULIN SER CALC-MCNC: 3.2 G/DL (ref 2.3–3.5)
GLUCOSE SERPL-MCNC: 79 MG/DL (ref 70–99)
HBA1C MFR BLD: 6.2 % (ref 0–5.6)
HCT VFR BLD AUTO: 42 % (ref 35.8–46.3)
HDLC SERPL-MCNC: 98 MG/DL (ref 40–60)
HDLC SERPL: 2.6 (ref 0–5)
HGB BLD-MCNC: 13.3 G/DL (ref 11.7–15.4)
IMM GRANULOCYTES # BLD AUTO: 0 K/UL (ref 0–0.5)
IMM GRANULOCYTES NFR BLD AUTO: 0 % (ref 0–5)
LDLC SERPL CALC-MCNC: 135 MG/DL (ref 0–100)
LYMPHOCYTES # BLD: 1.2 K/UL (ref 0.5–4.6)
LYMPHOCYTES NFR BLD: 20 % (ref 13–44)
MCH RBC QN AUTO: 30.5 PG (ref 26.1–32.9)
MCHC RBC AUTO-ENTMCNC: 31.7 G/DL (ref 31.4–35)
MCV RBC AUTO: 96.3 FL (ref 82–102)
MICROALBUMIN UR-MCNC: 6.79 MG/DL (ref 0–20)
MICROALBUMIN/CREAT UR-RTO: 320 MG/G (ref 0–30)
MONOCYTES # BLD: 0.5 K/UL (ref 0.1–1.3)
MONOCYTES NFR BLD: 8 % (ref 4–12)
NEUTS SEG # BLD: 4 K/UL (ref 1.7–8.2)
NEUTS SEG NFR BLD: 69 % (ref 43–78)
NRBC # BLD: 0 K/UL (ref 0–0.2)
PLATELET # BLD AUTO: 320 K/UL (ref 150–450)
PMV BLD AUTO: 9.4 FL (ref 9.4–12.3)
POTASSIUM SERPL-SCNC: 4.2 MMOL/L (ref 3.5–5.1)
PROT SERPL-MCNC: 7.3 G/DL (ref 6.3–8.2)
RBC # BLD AUTO: 4.36 M/UL (ref 4.05–5.2)
SODIUM SERPL-SCNC: 135 MMOL/L (ref 136–145)
TRIGL SERPL-MCNC: 83 MG/DL (ref 0–150)
TSH, 3RD GENERATION: 4.02 UIU/ML (ref 0.27–4.2)
VLDLC SERPL CALC-MCNC: 17 MG/DL (ref 6–23)
WBC # BLD AUTO: 5.8 K/UL (ref 4.3–11.1)

## 2024-08-16 ENCOUNTER — TELEPHONE (OUTPATIENT)
Dept: UROLOGY | Age: 69
End: 2024-08-16

## 2024-08-16 DIAGNOSIS — N39.0 ACUTE UTI: Primary | ICD-10-CM

## 2024-08-16 RX ORDER — CEPHALEXIN 500 MG/1
500 CAPSULE ORAL 3 TIMES DAILY
Qty: 21 CAPSULE | Refills: 0 | Status: SHIPPED | OUTPATIENT
Start: 2024-08-16

## 2024-08-16 NOTE — TELEPHONE ENCOUNTER
Pt called stating that she has no way of getting to the office to leave a urine sample. She says her blood sugars are high, and she knows she has a UTI. Is there anyway she can get an antibiotic to hold her over the weekend until her sister gets back into town to help her get to the office. Pt's pharmacy is [facility] on [address]. Please advise.

## 2024-08-20 ENCOUNTER — OFFICE VISIT (OUTPATIENT)
Dept: INTERNAL MEDICINE CLINIC | Facility: CLINIC | Age: 69
End: 2024-08-20
Payer: MEDICARE

## 2024-08-20 VITALS
TEMPERATURE: 97.6 F | HEART RATE: 89 BPM | HEIGHT: 68 IN | OXYGEN SATURATION: 98 % | BODY MASS INDEX: 23.49 KG/M2 | WEIGHT: 155 LBS | SYSTOLIC BLOOD PRESSURE: 140 MMHG | DIASTOLIC BLOOD PRESSURE: 60 MMHG

## 2024-08-20 DIAGNOSIS — G89.4 CHRONIC PAIN SYNDROME: ICD-10-CM

## 2024-08-20 DIAGNOSIS — E10.42 DIABETIC POLYNEUROPATHY ASSOCIATED WITH TYPE 1 DIABETES MELLITUS (HCC): ICD-10-CM

## 2024-08-20 DIAGNOSIS — E10.21 TYPE 1 DIABETES MELLITUS WITH DIABETIC NEPHROPATHY (HCC): ICD-10-CM

## 2024-08-20 DIAGNOSIS — Z00.00 MEDICARE ANNUAL WELLNESS VISIT, SUBSEQUENT: Primary | ICD-10-CM

## 2024-08-20 DIAGNOSIS — G89.29 OTHER CHRONIC PAIN: ICD-10-CM

## 2024-08-20 DIAGNOSIS — I10 ESSENTIAL HYPERTENSION: ICD-10-CM

## 2024-08-20 PROCEDURE — 1123F ACP DISCUSS/DSCN MKR DOCD: CPT | Performed by: INTERNAL MEDICINE

## 2024-08-20 PROCEDURE — 3078F DIAST BP <80 MM HG: CPT | Performed by: INTERNAL MEDICINE

## 2024-08-20 PROCEDURE — 3044F HG A1C LEVEL LT 7.0%: CPT | Performed by: INTERNAL MEDICINE

## 2024-08-20 PROCEDURE — 99214 OFFICE O/P EST MOD 30 MIN: CPT | Performed by: INTERNAL MEDICINE

## 2024-08-20 PROCEDURE — 3077F SYST BP >= 140 MM HG: CPT | Performed by: INTERNAL MEDICINE

## 2024-08-20 PROCEDURE — G0439 PPPS, SUBSEQ VISIT: HCPCS | Performed by: INTERNAL MEDICINE

## 2024-08-20 RX ORDER — HYDROCODONE BITARTRATE AND ACETAMINOPHEN 10; 325 MG/1; MG/1
1 TABLET ORAL EVERY 6 HOURS PRN
Qty: 120 TABLET | Refills: 0 | Status: SHIPPED | OUTPATIENT
Start: 2024-08-20 | End: 2024-09-19

## 2024-08-20 RX ORDER — HYDROCODONE BITARTRATE AND ACETAMINOPHEN 10; 325 MG/1; MG/1
1 TABLET ORAL EVERY 6 HOURS PRN
Qty: 120 TABLET | Refills: 0 | Status: SHIPPED | OUTPATIENT
Start: 2024-10-19 | End: 2024-11-18

## 2024-08-20 RX ORDER — HYDROCODONE BITARTRATE AND ACETAMINOPHEN 10; 325 MG/1; MG/1
1 TABLET ORAL EVERY 6 HOURS PRN
Qty: 120 TABLET | Refills: 0 | Status: SHIPPED | OUTPATIENT
Start: 2024-09-19 | End: 2024-10-19

## 2024-08-20 RX ORDER — NYSTATIN 100000 [USP'U]/G
POWDER TOPICAL 4 TIMES DAILY
Qty: 60 G | Refills: 11 | Status: SHIPPED | OUTPATIENT
Start: 2024-08-20

## 2024-08-20 RX ORDER — AMLODIPINE BESYLATE 2.5 MG/1
2.5 TABLET ORAL DAILY
Qty: 90 TABLET | Refills: 0 | Status: SHIPPED | OUTPATIENT
Start: 2024-08-20

## 2024-08-20 ASSESSMENT — PATIENT HEALTH QUESTIONNAIRE - PHQ9
SUM OF ALL RESPONSES TO PHQ QUESTIONS 1-9: 0
SUM OF ALL RESPONSES TO PHQ QUESTIONS 1-9: 0
2. FEELING DOWN, DEPRESSED OR HOPELESS: NOT AT ALL
SUM OF ALL RESPONSES TO PHQ QUESTIONS 1-9: 0
SUM OF ALL RESPONSES TO PHQ QUESTIONS 1-9: 0

## 2024-08-20 ASSESSMENT — LIFESTYLE VARIABLES: HOW MANY STANDARD DRINKS CONTAINING ALCOHOL DO YOU HAVE ON A TYPICAL DAY: PATIENT DOES NOT DRINK

## 2024-08-20 NOTE — PROGRESS NOTES
https://www.kidney.org/professionals/kdoqi/gfr_calculatorped    These results are not intended for use in patients <18 years of age.    eGFR results are calculated without a race factor using  the 2021 CKD-EPI equation. Careful clinical correlation is recommended, particularly when comparing to results calculated using previous equations.  The CKD-EPI equation is less accurate in patients with extremes of muscle mass, extra-renal metabolism of creatinine, excessive creatine ingestion, or following therapy that affects renal tubular secretion.      Calcium 08/13/2024 9.8  8.8 - 10.2 MG/DL Final    Total Bilirubin 08/13/2024 0.2  0.0 - 1.2 MG/DL Final    ALT 08/13/2024 20  12 - 65 U/L Final    AST 08/13/2024 29  15 - 37 U/L Final    Alk Phosphatase 08/13/2024 65  35 - 104 U/L Final    Total Protein 08/13/2024 7.3  6.3 - 8.2 g/dL Final    Albumin 08/13/2024 4.1  3.2 - 4.6 g/dL Final    Globulin 08/13/2024 3.2  2.3 - 3.5 g/dL Final    Albumin/Globulin Ratio 08/13/2024 1.3  1.0 - 1.9   Final    Hemoglobin A1C 08/13/2024 6.2 (H)  0 - 5.6 % Final    Comment: Reference Range  Normal       <5.7%  Prediabetes  5.7-6.4%  Diabetes     >6.4%      Estimated Avg Glucose 08/13/2024 132  mg/dL Final    Microalb, Ur 08/13/2024 6.79  0.00 - 20.00 MG/DL Final    Creatinine, Ur 08/13/2024 21.20 (L)  28.00 - 217.00 mg/dL Final    Microalb/Creat Ratio 08/13/2024 320 (H)  0 - 30 mg/g Final        Past Medical History:   Diagnosis Date    Benign paroxysmal positional vertigo     Breast cancer (HCC) 12/2010    Cancer (HCC) 2010    lt breast    Cancer (HCC) 1988    colon    Chronic pain     Chronic renal disease, stage III (HCC) [772987] 6/29/2022    Diabetic polyneuropathy associated with type 1 diabetes mellitus (HCC) 1/13/2020    Gastroparesis     GERD (gastroesophageal reflux disease)     medicaton controlled    Glaucoma     Hiatal hernia     HLD (hyperlipidemia)     Hypertension     medication controlled    Neuropathy, diabetic (HCC)      route as needed for Opioid Reversal    Chronic pain syndrome  -     HYDROcodone-acetaminophen (NORCO)  MG per tablet; Take 1 tablet by mouth every 6 hours as needed for Pain for up to 30 days. Max Daily Amount: 4 tablets  -     HYDROcodone-acetaminophen (NORCO)  MG per tablet; Take 1 tablet by mouth every 6 hours as needed for Pain for up to 30 days. Intended supply: 30 days Max Daily Amount: 4 tablets  -     HYDROcodone-acetaminophen (NORCO)  MG per tablet; Take 1 tablet by mouth every 6 hours as needed for Pain for up to 30 days. Intended supply: 30 days Max Daily Amount: 4 tablets    Diabetic polyneuropathy associated with type 1 diabetes mellitus (HCC)  -     HYDROcodone-acetaminophen (NORCO)  MG per tablet; Take 1 tablet by mouth every 6 hours as needed for Pain for up to 30 days. Max Daily Amount: 4 tablets  -     HYDROcodone-acetaminophen (NORCO)  MG per tablet; Take 1 tablet by mouth every 6 hours as needed for Pain for up to 30 days. Intended supply: 30 days Max Daily Amount: 4 tablets  -     HYDROcodone-acetaminophen (NORCO)  MG per tablet; Take 1 tablet by mouth every 6 hours as needed for Pain for up to 30 days. 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 Intended supply: 30 days Max Daily Amount: 4 tablets  -     HYDROcodone-acetaminophen (NORCO)  MG per tablet; Take 1 tablet by mouth every 6 hours as needed for Pain for up to 30 days. Intended supply: 30 days Max Daily Amount: 4 tablets  -     Microalbumin / Creatinine Urine Ratio; Future  -     Basic Metabolic Panel; Future    Essential hypertension  -     nystatin (MYCOSTATIN) 514007 UNIT/GM powder; Apply topically 4

## 2024-08-20 NOTE — PATIENT INSTRUCTIONS

## 2024-08-20 NOTE — PROGRESS NOTES
Medicare Annual Wellness Visit    Fariba Blum is here for Follow-up (3 month check up and review labs thyroid,HTN) and Medicare AWV    Assessment & Plan   Medicare annual wellness visit, subsequent  Other chronic pain  -     HYDROcodone-acetaminophen (NORCO)  MG per tablet; Take 1 tablet by mouth every 6 hours as needed for Pain for up to 30 days. Max Daily Amount: 4 tablets, Disp-120 tablet, R-0Normal  -     HYDROcodone-acetaminophen (NORCO)  MG per tablet; Take 1 tablet by mouth every 6 hours as needed for Pain for up to 30 days. Intended supply: 30 days Max Daily Amount: 4 tablets, Disp-120 tablet, R-0Normal  -     HYDROcodone-acetaminophen (NORCO)  MG per tablet; Take 1 tablet by mouth every 6 hours as needed for Pain for up to 30 days. Intended supply: 30 days Max Daily Amount: 4 tablets, Disp-120 tablet, R-0Normal  Chronic pain syndrome  -     HYDROcodone-acetaminophen (NORCO)  MG per tablet; Take 1 tablet by mouth every 6 hours as needed for Pain for up to 30 days. 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 Intended supply: 30 days Max Daily Amount: 4 tablets, Disp-120 tablet, R-0Normal  -     HYDROcodone-acetaminophen (NORCO)  MG per tablet; Take 1 tablet by mouth every 6 hours as needed for Pain for up to 30 days. Intended supply: 30 days Max Daily Amount: 4 tablets, Disp-120 tablet, R-0Normal    Recommendations for Preventive Services Due: see orders and patient instructions/AVS.  Recommended screening schedule for the next 5-10 years is provided to the patient in written form: see Patient Instructions/AVS.     Return in 3 months (on 11/20/2024).     Subjective   The following acute and/or chronic  Todd Charles DO   magnesium gluconate (MAGONATE) 500 MG tablet Take 1 tablet by mouth 2 times daily Yes ProviderKrystina MD   levothyroxine (SYNTHROID) 112 MCG tablet Take 1 tablet by mouth every morning (before breakfast) Yes Todd Charles DO   losartan (COZAAR) 100 MG tablet Take 1 tablet by mouth daily Yes Todd Charles DO   alendronate (FOSAMAX) 70 MG tablet Take 1 tablet by mouth every 7 days Yes Todd Charles DO   Methylcobalamin (METHYL B-12 PO) Take 1,000 mcg by mouth daily. Yes ProviderKrystina MD   Contact Lens Care Products (THERATEARS) MISC Take 1 tablet by mouth 3 times daily Yes ProviderKrystina MD   Alpha-Lipoic Acid 600 MG CAPS Take 1 tablet by mouth 2 times daily Yes Automatic Reconciliation, Ar   Calcium Carbonate-Vitamin D (CALCIUM-VITAMIN D) 600-125 MG-UNIT TABS Take 1 tablet by mouth 2 times daily Yes Automatic Reconciliation, Ar   Glucagon (GVOKE HYPOPEN 2-PACK) 1 MG/0.2ML SOAJ Inject 1 mg into the skin as needed Yes Automatic Reconciliation, Ar   Lutein-Zeaxanthin 20-1 MG CAPS Take 25 mg by mouth daily Yes Automatic Reconciliation, Ar   ondansetron (ZOFRAN-ODT) 8 MG TBDP disintegrating tablet Take 1 tablet by mouth every 8 hours as needed for Nausea or Vomiting Yes Automatic Reconciliation, Ar   senna-docusate (PERICOLACE) 8.6-50 MG per tablet Take 1 tablet by mouth daily as needed Yes Automatic Reconciliation, Ar       CareTeam (Including outside providers/suppliers regularly involved in providing care):   Patient Care Team:  Todd Charles DO as PCP - General (Internal Medicine)  Todd Charles DO as PCP - Empaneled Provider  Todd Charles DO as Referring Physician  Tabby Dickerson MD as Physician      Reviewed and updated this visit:  Allergies  Meds  Problems

## 2024-09-11 ENCOUNTER — TELEPHONE (OUTPATIENT)
Dept: ENDOCRINOLOGY | Age: 69
End: 2024-09-11

## 2024-09-11 ENCOUNTER — OFFICE VISIT (OUTPATIENT)
Dept: ENDOCRINOLOGY | Age: 69
End: 2024-09-11
Payer: MEDICARE

## 2024-09-11 VITALS
BODY MASS INDEX: 23.43 KG/M2 | WEIGHT: 154.6 LBS | DIASTOLIC BLOOD PRESSURE: 70 MMHG | HEIGHT: 68 IN | HEART RATE: 68 BPM | OXYGEN SATURATION: 100 % | SYSTOLIC BLOOD PRESSURE: 148 MMHG

## 2024-09-11 DIAGNOSIS — Z86.39 HISTORY OF GRAVES' DISEASE: ICD-10-CM

## 2024-09-11 DIAGNOSIS — Z86.39 HISTORY OF HYPOGLYCEMIA: ICD-10-CM

## 2024-09-11 DIAGNOSIS — I10 ESSENTIAL HYPERTENSION: ICD-10-CM

## 2024-09-11 DIAGNOSIS — E89.0 POST-SURGICAL HYPOTHYROIDISM: ICD-10-CM

## 2024-09-11 DIAGNOSIS — E10.649 TYPE 1 DIABETES MELLITUS WITH HYPOGLYCEMIA WITHOUT COMA, WITH LONG-TERM CURRENT USE OF INSULIN (HCC): Primary | ICD-10-CM

## 2024-09-11 PROCEDURE — 3078F DIAST BP <80 MM HG: CPT | Performed by: PHYSICIAN ASSISTANT

## 2024-09-11 PROCEDURE — 95251 CONT GLUC MNTR ANALYSIS I&R: CPT | Performed by: PHYSICIAN ASSISTANT

## 2024-09-11 PROCEDURE — 99214 OFFICE O/P EST MOD 30 MIN: CPT | Performed by: PHYSICIAN ASSISTANT

## 2024-09-11 PROCEDURE — 3077F SYST BP >= 140 MM HG: CPT | Performed by: PHYSICIAN ASSISTANT

## 2024-09-11 PROCEDURE — 1123F ACP DISCUSS/DSCN MKR DOCD: CPT | Performed by: PHYSICIAN ASSISTANT

## 2024-09-11 PROCEDURE — 3044F HG A1C LEVEL LT 7.0%: CPT | Performed by: PHYSICIAN ASSISTANT

## 2024-09-12 ENCOUNTER — TELEPHONE (OUTPATIENT)
Dept: ENDOCRINOLOGY | Age: 69
End: 2024-09-12

## 2024-09-12 DIAGNOSIS — E10.649 TYPE 1 DIABETES MELLITUS WITH HYPOGLYCEMIA WITHOUT COMA, WITH LONG-TERM CURRENT USE OF INSULIN (HCC): Primary | ICD-10-CM

## 2024-09-13 RX ORDER — BLOOD SUGAR DIAGNOSTIC
STRIP MISCELLANEOUS
Qty: 200 EACH | Refills: 3 | Status: SHIPPED | OUTPATIENT
Start: 2024-09-13

## 2024-09-24 ENCOUNTER — TELEPHONE (OUTPATIENT)
Dept: INTERNAL MEDICINE CLINIC | Facility: CLINIC | Age: 69
End: 2024-09-24

## 2024-09-24 NOTE — TELEPHONE ENCOUNTER
Patient calling about her patient assistance from [employer]. She states she received a letter dated 9/17/2024 and according to letter our office was to receive one too. I do not see anything scanned into patient's chart.     Pt aware Silvia is out of the office this week.

## 2024-10-02 NOTE — TELEPHONE ENCOUNTER
Called and left message on the patient's voice mail. Paper work was faxed and was put in the drawer. Will have scanned into chart.

## 2024-10-10 ENCOUNTER — TELEPHONE (OUTPATIENT)
Dept: UROLOGY | Age: 69
End: 2024-10-10

## 2024-10-10 RX ORDER — CEPHALEXIN 500 MG/1
500 CAPSULE ORAL 3 TIMES DAILY
Qty: 21 CAPSULE | Refills: 0 | Status: SHIPPED | OUTPATIENT
Start: 2024-10-10 | End: 2024-10-17

## 2024-10-10 NOTE — TELEPHONE ENCOUNTER
Pt called stating that she thinks she has another UTI, because her sugars has been running high. She stated that she doesn't have a way to get here, and she doesn't have anyone that can bring her a specimen cup to collect urine and drop it off for her. Her sister is out of town until Monday or Tuesday. I told her that since we sent her antibiotics two times without checking her urine that we need to check it. She wanted me to ask again that she doesn't have a way here. Please advise.

## 2024-10-10 NOTE — TELEPHONE ENCOUNTER
Awa please send keflex 500 mg po tid x 7 days. 21 tabs. 0 refills.    Jenny please let her know this is the last time I will call in antibiotics. It is against best practice to treat without urinalysis and culture. It is not in her best interest.    Abby

## 2024-11-04 DIAGNOSIS — E10.65 TYPE 1 DIABETES MELLITUS WITH HYPERGLYCEMIA (HCC): ICD-10-CM

## 2024-11-04 RX ORDER — INSULIN GLARGINE 100 [IU]/ML
INJECTION, SOLUTION SUBCUTANEOUS
Qty: 10 ADJUSTABLE DOSE PRE-FILLED PEN SYRINGE | Refills: 11 | Status: SHIPPED | OUTPATIENT
Start: 2024-11-04

## 2024-11-04 NOTE — TELEPHONE ENCOUNTER
Completing form and Rx for patient assistance. Will fax to [facility] [phone] after Dr Charles signs Rx. The patient was notified will fax.

## 2024-11-05 ENCOUNTER — TELEPHONE (OUTPATIENT)
Dept: INTERNAL MEDICINE CLINIC | Facility: CLINIC | Age: 69
End: 2024-11-05

## 2024-11-05 NOTE — TELEPHONE ENCOUNTER
Called patient about medication arrived and in the fridge patient states she will  this week her or her sister

## 2024-11-21 ENCOUNTER — OFFICE VISIT (OUTPATIENT)
Dept: INTERNAL MEDICINE CLINIC | Facility: CLINIC | Age: 69
End: 2024-11-21
Payer: MEDICARE

## 2024-11-21 VITALS
WEIGHT: 153 LBS | BODY MASS INDEX: 23.19 KG/M2 | TEMPERATURE: 97.9 F | HEIGHT: 68 IN | HEART RATE: 94 BPM | OXYGEN SATURATION: 97 % | SYSTOLIC BLOOD PRESSURE: 128 MMHG | DIASTOLIC BLOOD PRESSURE: 60 MMHG

## 2024-11-21 DIAGNOSIS — E10.42 DIABETIC POLYNEUROPATHY ASSOCIATED WITH TYPE 1 DIABETES MELLITUS (HCC): ICD-10-CM

## 2024-11-21 DIAGNOSIS — G89.4 CHRONIC PAIN SYNDROME: ICD-10-CM

## 2024-11-21 DIAGNOSIS — K31.84 GASTROPARESIS: ICD-10-CM

## 2024-11-21 DIAGNOSIS — E10.65 TYPE 1 DIABETES MELLITUS WITH HYPERGLYCEMIA (HCC): Primary | ICD-10-CM

## 2024-11-21 PROCEDURE — 3078F DIAST BP <80 MM HG: CPT | Performed by: INTERNAL MEDICINE

## 2024-11-21 PROCEDURE — 3074F SYST BP LT 130 MM HG: CPT | Performed by: INTERNAL MEDICINE

## 2024-11-21 PROCEDURE — 3044F HG A1C LEVEL LT 7.0%: CPT | Performed by: INTERNAL MEDICINE

## 2024-11-21 PROCEDURE — 99214 OFFICE O/P EST MOD 30 MIN: CPT | Performed by: INTERNAL MEDICINE

## 2024-11-21 PROCEDURE — 1160F RVW MEDS BY RX/DR IN RCRD: CPT | Performed by: INTERNAL MEDICINE

## 2024-11-21 PROCEDURE — 1123F ACP DISCUSS/DSCN MKR DOCD: CPT | Performed by: INTERNAL MEDICINE

## 2024-11-21 PROCEDURE — 1159F MED LIST DOCD IN RCRD: CPT | Performed by: INTERNAL MEDICINE

## 2024-11-21 RX ORDER — HYDROCODONE BITARTRATE AND ACETAMINOPHEN 10; 325 MG/1; MG/1
1 TABLET ORAL EVERY 6 HOURS PRN
Qty: 120 TABLET | Refills: 0 | Status: SHIPPED | OUTPATIENT
Start: 2025-01-20 | End: 2025-02-19

## 2024-11-21 RX ORDER — ERYTHROMYCIN 250 MG/1
250 TABLET, COATED ORAL
Qty: 90 TABLET | Refills: 0 | Status: SHIPPED | OUTPATIENT
Start: 2024-11-21 | End: 2024-12-21

## 2024-11-21 RX ORDER — HYDROCODONE BITARTRATE AND ACETAMINOPHEN 10; 325 MG/1; MG/1
1 TABLET ORAL EVERY 6 HOURS PRN
Qty: 120 TABLET | Refills: 0 | Status: SHIPPED | OUTPATIENT
Start: 2024-12-21 | End: 2025-01-20

## 2024-11-21 RX ORDER — HYDROCODONE BITARTRATE AND ACETAMINOPHEN 10; 325 MG/1; MG/1
1 TABLET ORAL EVERY 6 HOURS PRN
Qty: 120 TABLET | Refills: 0 | Status: SHIPPED | OUTPATIENT
Start: 2024-11-21 | End: 2024-12-21

## 2024-11-21 NOTE — PROGRESS NOTES
Fariba was seen today for medication refill.    Diagnoses and all orders for this visit:    Type 1 diabetes mellitus with hyperglycemia (HCC)  -     HYDROcodone-acetaminophen (NORCO)  MG per tablet; Take 1 tablet by mouth every 6 hours as needed for Pain for up to 30 days. Intended supply: 30 days Max Daily Amount: 4 tablets  -     HYDROcodone-acetaminophen (NORCO)  MG per tablet; Take 1 tablet by mouth every 6 hours as needed for Pain for up to 30 days. Intended supply: 30 days Max Daily Amount: 4 tablets  -     HYDROcodone-acetaminophen (NORCO)  MG per tablet; Take 1 tablet by mouth every 6 hours as needed for Pain for up to 30 days. Intended supply: 30 days Max Daily Amount: 4 tablets  -     Hemoglobin A1C; Future  -     Lipid Panel; Future  -     CBC; Future  -     Comprehensive Metabolic Panel; Future  -     TSH with Reflex; Future    Chronic pain syndrome  -     HYDROcodone-acetaminophen (NORCO)  MG per tablet; Take 1 tablet by mouth every 6 hours as needed for Pain for up to 30 days. Intended supply: 30 days Max Daily Amount: 4 tablets  -     HYDROcodone-acetaminophen (NORCO)  MG per tablet; Take 1 tablet by mouth every 6 hours as needed for Pain for up to 30 days. Intended supply: 30 days Max Daily Amount: 4 tablets  -     HYDROcodone-acetaminophen (NORCO)  MG per tablet; Take 1 tablet by mouth every 6 hours as needed for Pain for up to 30 days. Intended supply: 30 days Max Daily Amount: 4 tablets    Diabetic polyneuropathy associated with type 1 diabetes mellitus (HCC)  -     HYDROcodone-acetaminophen (NORCO)  MG per tablet; Take 1 tablet by mouth every 6 hours as needed for Pain for up to 30 days. Intended supply: 30 days Max Daily Amount: 4 tablets  -     HYDROcodone-acetaminophen (NORCO)  MG per tablet; Take 1 tablet by mouth every 6 hours as needed for Pain for up to 30 days. Intended supply: 30 days Max Daily Amount: 4 tablets  -

## 2024-11-22 ENCOUNTER — TELEPHONE (OUTPATIENT)
Dept: INTERNAL MEDICINE CLINIC | Facility: CLINIC | Age: 69
End: 2024-11-22

## 2024-11-22 NOTE — TELEPHONE ENCOUNTER
Called about cost of Erythromycin $95.00. Wanted Dr Charles to carr to Biaxin or [place]. Talked with Dr Charles and he said they are not the same. The patient was notified.

## 2025-01-28 ENCOUNTER — TELEPHONE (OUTPATIENT)
Dept: INTERNAL MEDICINE CLINIC | Facility: CLINIC | Age: 70
End: 2025-01-28

## 2025-01-28 NOTE — TELEPHONE ENCOUNTER
Did you get her patient asst paperwork for this medication?    LANTUS SOLOSTAR 100 UNIT/ML injection pen [[id number]]    Order Details  Dose, Route, Frequency: As Directed   Dispense Quantity: 10 Adjustable Dose Pre-filled Pen Syringe Refills: 11          Sig: Start 30 units and decrease by 1 unit every week until FBG  without hypoglycemia, max daily dose 30       Please call patient back if you have any ?'s

## 2025-02-19 ENCOUNTER — TELEPHONE (OUTPATIENT)
Dept: PHARMACY | Facility: CLINIC | Age: 70
End: 2025-02-19

## 2025-02-19 NOTE — TELEPHONE ENCOUNTER
Todd Charles, DO - Statin Therapy Care Gap    Patient has a visit with you on 2/20/25.  Chart reviewed due to insurer-identified care gap.  Fariba Blum is receiving treatment for diabetes and is not currently prescribed statin therapy.    Per chart review, patient is unable to tolerate statins due to myalgia, which qualifies the patient for an exclusion from this care gap.    CMS is allowing patients to be excluded from statin therapy if certain diagnosis/ICD-10 codes are added annually at a provider visit (diagnosis from visit claim is used to track the exclusion).      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  CPA in place:  No  Recommendation Provided To: Provider: 1 via Note to Provider  Intervention Accepted By: Provider: 0  Gap Closed?: No   Time Spent (min): 20

## 2025-02-20 ENCOUNTER — OFFICE VISIT (OUTPATIENT)
Dept: INTERNAL MEDICINE CLINIC | Facility: CLINIC | Age: 70
End: 2025-02-20

## 2025-02-20 VITALS
HEART RATE: 86 BPM | TEMPERATURE: 97.4 F | SYSTOLIC BLOOD PRESSURE: 120 MMHG | OXYGEN SATURATION: 96 % | WEIGHT: 154 LBS | DIASTOLIC BLOOD PRESSURE: 70 MMHG | BODY MASS INDEX: 23.34 KG/M2 | HEIGHT: 68 IN

## 2025-02-20 DIAGNOSIS — E10.65 TYPE 1 DIABETES MELLITUS WITH HYPERGLYCEMIA (HCC): Primary | ICD-10-CM

## 2025-02-20 DIAGNOSIS — Z00.00 MEDICARE ANNUAL WELLNESS VISIT, SUBSEQUENT: ICD-10-CM

## 2025-02-20 DIAGNOSIS — E03.9 ACQUIRED HYPOTHYROIDISM: ICD-10-CM

## 2025-02-20 DIAGNOSIS — R26.89 BALANCE DISORDER: ICD-10-CM

## 2025-02-20 DIAGNOSIS — F11.20 OPIOID DEPENDENCE WITH CURRENT USE (HCC): ICD-10-CM

## 2025-02-20 DIAGNOSIS — N18.30 STAGE 3 CHRONIC KIDNEY DISEASE, UNSPECIFIED WHETHER STAGE 3A OR 3B CKD (HCC): ICD-10-CM

## 2025-02-20 DIAGNOSIS — E89.0 POST-SURGICAL HYPOTHYROIDISM: ICD-10-CM

## 2025-02-20 DIAGNOSIS — I10 ESSENTIAL HYPERTENSION: ICD-10-CM

## 2025-02-20 DIAGNOSIS — Z91.81 AT HIGH RISK FOR FALLS: ICD-10-CM

## 2025-02-20 DIAGNOSIS — G89.4 CHRONIC PAIN SYNDROME: ICD-10-CM

## 2025-02-20 LAB
ALBUMIN SERPL-MCNC: 4 G/DL (ref 3.2–4.6)
ALBUMIN/GLOB SERPL: 1.3 (ref 1–1.9)
ALP SERPL-CCNC: 65 U/L (ref 35–104)
ALT SERPL-CCNC: 16 U/L (ref 8–45)
ANION GAP SERPL CALC-SCNC: 10 MMOL/L (ref 7–16)
AST SERPL-CCNC: 22 U/L (ref 15–37)
BILIRUB SERPL-MCNC: 0.3 MG/DL (ref 0–1.2)
BUN SERPL-MCNC: 17 MG/DL (ref 8–23)
CALCIUM SERPL-MCNC: 9.2 MG/DL (ref 8.8–10.2)
CHLORIDE SERPL-SCNC: 103 MMOL/L (ref 98–107)
CHOLEST SERPL-MCNC: 201 MG/DL (ref 0–200)
CO2 SERPL-SCNC: 25 MMOL/L (ref 20–29)
CREAT SERPL-MCNC: 0.68 MG/DL (ref 0.6–1.1)
CREAT UR-MCNC: 18.3 MG/DL (ref 28–217)
ERYTHROCYTE [DISTWIDTH] IN BLOOD BY AUTOMATED COUNT: 13.2 % (ref 11.9–14.6)
EST. AVERAGE GLUCOSE BLD GHB EST-MCNC: 128 MG/DL
GLOBULIN SER CALC-MCNC: 3.1 G/DL (ref 2.3–3.5)
GLUCOSE SERPL-MCNC: 64 MG/DL (ref 70–99)
HBA1C MFR BLD: 6.1 % (ref 0–5.6)
HCT VFR BLD AUTO: 39.1 % (ref 35.8–46.3)
HDLC SERPL-MCNC: 93 MG/DL (ref 40–60)
HDLC SERPL: 2.2 (ref 0–5)
HGB BLD-MCNC: 12.8 G/DL (ref 11.7–15.4)
LDLC SERPL CALC-MCNC: 95 MG/DL (ref 0–100)
MCH RBC QN AUTO: 30.1 PG (ref 26.1–32.9)
MCHC RBC AUTO-ENTMCNC: 32.7 G/DL (ref 31.4–35)
MCV RBC AUTO: 92 FL (ref 82–102)
MICROALBUMIN UR-MCNC: 4.33 MG/DL (ref 0–20)
MICROALBUMIN/CREAT UR-RTO: 237 MG/G (ref 0–30)
NRBC # BLD: 0 K/UL (ref 0–0.2)
PLATELET # BLD AUTO: 324 K/UL (ref 150–450)
PMV BLD AUTO: 9.8 FL (ref 9.4–12.3)
POTASSIUM SERPL-SCNC: 4.1 MMOL/L (ref 3.5–5.1)
PROT SERPL-MCNC: 7 G/DL (ref 6.3–8.2)
RBC # BLD AUTO: 4.25 M/UL (ref 4.05–5.2)
SODIUM SERPL-SCNC: 137 MMOL/L (ref 136–145)
TRIGL SERPL-MCNC: 67 MG/DL (ref 0–150)
TSH W FREE THYROID IF ABNORMAL: 1.24 UIU/ML (ref 0.27–4.2)
VLDLC SERPL CALC-MCNC: 13 MG/DL (ref 6–23)
WBC # BLD AUTO: 6.5 K/UL (ref 4.3–11.1)

## 2025-02-20 RX ORDER — LEVOTHYROXINE SODIUM 112 UG/1
112 TABLET ORAL
Qty: 90 TABLET | Refills: 3 | Status: SHIPPED | OUTPATIENT
Start: 2025-02-20

## 2025-02-20 RX ORDER — HYDROCODONE BITARTRATE AND ACETAMINOPHEN 10; 325 MG/1; MG/1
1 TABLET ORAL EVERY 6 HOURS PRN
Qty: 120 TABLET | Refills: 0 | Status: SHIPPED | OUTPATIENT
Start: 2025-02-20 | End: 2025-03-22

## 2025-02-20 RX ORDER — LOSARTAN POTASSIUM 100 MG/1
100 TABLET ORAL DAILY
Qty: 90 TABLET | Refills: 3 | Status: SHIPPED | OUTPATIENT
Start: 2025-02-20

## 2025-02-20 RX ORDER — HYDROCODONE BITARTRATE AND ACETAMINOPHEN 10; 325 MG/1; MG/1
1 TABLET ORAL EVERY 6 HOURS PRN
COMMUNITY
Start: 2025-02-07 | End: 2025-02-20 | Stop reason: SDUPTHER

## 2025-02-20 SDOH — ECONOMIC STABILITY: FOOD INSECURITY: WITHIN THE PAST 12 MONTHS, THE FOOD YOU BOUGHT JUST DIDN'T LAST AND YOU DIDN'T HAVE MONEY TO GET MORE.: NEVER TRUE

## 2025-02-20 SDOH — ECONOMIC STABILITY: FOOD INSECURITY: WITHIN THE PAST 12 MONTHS, YOU WORRIED THAT YOUR FOOD WOULD RUN OUT BEFORE YOU GOT MONEY TO BUY MORE.: NEVER TRUE

## 2025-02-20 ASSESSMENT — PATIENT HEALTH QUESTIONNAIRE - PHQ9
SUM OF ALL RESPONSES TO PHQ9 QUESTIONS 1 & 2: 0
SUM OF ALL RESPONSES TO PHQ QUESTIONS 1-9: 0
SUM OF ALL RESPONSES TO PHQ QUESTIONS 1-9: 0
1. LITTLE INTEREST OR PLEASURE IN DOING THINGS: NOT AT ALL
SUM OF ALL RESPONSES TO PHQ QUESTIONS 1-9: 0
2. FEELING DOWN, DEPRESSED OR HOPELESS: NOT AT ALL
SUM OF ALL RESPONSES TO PHQ QUESTIONS 1-9: 0

## 2025-02-20 ASSESSMENT — LIFESTYLE VARIABLES
HOW OFTEN DO YOU HAVE A DRINK CONTAINING ALCOHOL: NEVER
HOW MANY STANDARD DRINKS CONTAINING ALCOHOL DO YOU HAVE ON A TYPICAL DAY: PATIENT DOES NOT DRINK

## 2025-02-20 NOTE — PROGRESS NOTES
Fariba Blum (: 1955) is a 69 y.o. female, here for evaluation of the following chief complaint(s):  Follow-up (3 month), Medicare AWV, and Other (Wants order for in home PT)   Fariba was seen today for follow-up, medicare awv and other.    Diagnoses and all orders for this visit:    Type 1 diabetes mellitus with hyperglycemia ([facility])    Acquired hypothyroidism  -     levothyroxine (SYNTHROID) 112 MCG tablet; Take 1 tablet by mouth every morning (before breakfast)  -     TSH reflex to FT4; Future    Essential hypertension  -     losartan (COZAAR) 100 MG tablet; Take 1 tablet by mouth daily  -     Albumin/Creatinine Ratio, Urine; Future    Chronic pain syndrome  -     HYDROcodone-acetaminophen (NORCO)  MG per tablet; Take 1 tablet by mouth every 6 hours as needed for Pain for up to 30 days. Max Daily Amount: 4 tablets  -     HYDROcodone-acetaminophen (NORCO)  MG per tablet; Take 1 tablet by mouth every 6 hours as needed for Pain for up to 30 days. Max Daily Amount: 4 tablets  -     HYDROcodone-acetaminophen (NORCO)  MG per tablet; Take 1 tablet by mouth every 6 hours as needed for Pain for up to 30 days. Max Daily Amount: 4 tablets    Post-surgical hypothyroidism    Opioid dependence with current use ([facility])    Stage 3 chronic kidney disease, unspecified whether stage 3a or 3b CKD ([facility])    Medicare annual wellness visit, subsequent    Balance disorder  -     [facility] by Yamile Jansen    At high risk for falls  -     [facility]    Other orders  -     Hemoglobin A1C; Future  -     Lipid Panel; Future  -     CBC; Future  -     Comprehensive Metabolic Panel; Future        Assessment & Plan  1. Overactive bladder.  The patient reports significant distress due to overactive bladder symptoms. She is considering Botox injections and vaginal cream as potential treatments. She will discuss these options with her urologist, Abby, at her upcoming

## 2025-02-20 NOTE — PATIENT INSTRUCTIONS

## 2025-03-05 ENCOUNTER — TELEPHONE (OUTPATIENT)
Dept: UROLOGY | Age: 70
End: 2025-03-05

## 2025-03-06 ENCOUNTER — TELEPHONE (OUTPATIENT)
Dept: INTERNAL MEDICINE CLINIC | Facility: CLINIC | Age: 70
End: 2025-03-06

## 2025-03-06 NOTE — TELEPHONE ENCOUNTER
Justyna called to let us know patient missed PT appointment today d/t not feeling well, however, rescheduled for tomorrow.

## 2025-03-12 ENCOUNTER — TELEPHONE (OUTPATIENT)
Dept: ENDOCRINOLOGY | Age: 70
End: 2025-03-12

## 2025-03-12 ENCOUNTER — OFFICE VISIT (OUTPATIENT)
Dept: ENDOCRINOLOGY | Age: 70
End: 2025-03-12
Payer: MEDICARE

## 2025-03-12 VITALS
WEIGHT: 155.4 LBS | HEIGHT: 68 IN | SYSTOLIC BLOOD PRESSURE: 122 MMHG | OXYGEN SATURATION: 98 % | HEART RATE: 82 BPM | DIASTOLIC BLOOD PRESSURE: 78 MMHG | BODY MASS INDEX: 23.55 KG/M2

## 2025-03-12 DIAGNOSIS — E10.42 DIABETIC POLYNEUROPATHY ASSOCIATED WITH TYPE 1 DIABETES MELLITUS (HCC): ICD-10-CM

## 2025-03-12 DIAGNOSIS — E10.649 TYPE 1 DIABETES MELLITUS WITH HYPOGLYCEMIA WITHOUT COMA, WITH LONG-TERM CURRENT USE OF INSULIN (HCC): Primary | ICD-10-CM

## 2025-03-12 DIAGNOSIS — E89.0 POST-SURGICAL HYPOTHYROIDISM: ICD-10-CM

## 2025-03-12 DIAGNOSIS — Z86.39 HISTORY OF GRAVES' DISEASE: ICD-10-CM

## 2025-03-12 DIAGNOSIS — Z86.39 HISTORY OF HYPOGLYCEMIA: ICD-10-CM

## 2025-03-12 DIAGNOSIS — I10 ESSENTIAL HYPERTENSION: ICD-10-CM

## 2025-03-12 PROCEDURE — 1159F MED LIST DOCD IN RCRD: CPT | Performed by: PHYSICIAN ASSISTANT

## 2025-03-12 PROCEDURE — 3078F DIAST BP <80 MM HG: CPT | Performed by: PHYSICIAN ASSISTANT

## 2025-03-12 PROCEDURE — 3044F HG A1C LEVEL LT 7.0%: CPT | Performed by: PHYSICIAN ASSISTANT

## 2025-03-12 PROCEDURE — 3074F SYST BP LT 130 MM HG: CPT | Performed by: PHYSICIAN ASSISTANT

## 2025-03-12 PROCEDURE — 99214 OFFICE O/P EST MOD 30 MIN: CPT | Performed by: PHYSICIAN ASSISTANT

## 2025-03-12 PROCEDURE — 1160F RVW MEDS BY RX/DR IN RCRD: CPT | Performed by: PHYSICIAN ASSISTANT

## 2025-03-12 PROCEDURE — 95251 CONT GLUC MNTR ANALYSIS I&R: CPT | Performed by: PHYSICIAN ASSISTANT

## 2025-03-12 PROCEDURE — 1123F ACP DISCUSS/DSCN MKR DOCD: CPT | Performed by: PHYSICIAN ASSISTANT

## 2025-03-12 RX ORDER — INSULIN ASPART 100 [IU]/ML
INJECTION, SOLUTION INTRAVENOUS; SUBCUTANEOUS
Qty: 45 ML | Refills: 3 | Status: SHIPPED | OUTPATIENT
Start: 2025-03-12

## 2025-03-12 NOTE — TELEPHONE ENCOUNTER
Patient came in today for office visit with Елена. Per patient Елена is changing her medication from Fiasp to Novolog. Patient said that she is already approved through [employer] Assistance for Fiasp through Dr. Charles's office. She said Dr. Charles told her that he could not write RX for Novolog because he did not see a note from Елена stating the change.    Can you send Dr. Charles's office a message stating that the medication has changed so he can possibly do a medication change form for Novolog since she is already approved through the program through them.

## 2025-03-12 NOTE — PROGRESS NOTES
[facility] ENDOCRINOLOGY   AND   THYROID NODULE CLINIC    Елена Valdez PA-C  [facility]ocrin[facility]  [address] [place] [address], [address], [place] [address]  Phone [phone]  Facsimile [phone]          Fariba Blum is a 69 y.o. female seen 3/12/2025 for follow up evaluation of of type 1 diabetes on MDI with CGM            Assessment and Plan:            Interpretation of 72 hour glucose monitor:   At least 72 hours of data were reviewed.  The patient utilizes a dexcom G6 continuous glucose monitoring system.  The average glucose during the reviewed timeframe was 109 with a standard deviation of 35.  There is a pattern of frequent overnight and intermittant postprandial hypoglycemia.    Assessment & Plan      This note was impacted by AI downtime and generated from memory, please excuse any errors    1. Type 1 diabetes mellitus with hypoglycemia without coma, with long-term current use of insulin (HCC)  Hypoglycemia has somewhat improved but remains patient greatest barrier to control.  Patient is experiencing 10% hypoglycemia and has increased her basal insulin there is a large disparity between her basal insulin use and her prandial insulin use.  Patient does not appear willing to down titrate her basal insulin however she will lower it from 32 to at least 30 she is able to switch from using Fiasp to NovoLog.  Change Fiasp to NovoLog and consider based unit of 3 units before meals.  However, I do not believe patient will tolerate 3 units before meals unless she is able to significantly down titrate her Lantus dose.    Risk of hypoglycemia reviewed at length.  Will of 15's reviewed.  Importance of checking fingerstick due to CGM delay discussed.  Patient declines glucagon prescription.    Patient given information about free diabetes education both through our hospital system and online.  Patient not interested    Risk associated with uncontrolled diabetes in the context

## 2025-03-13 NOTE — TELEPHONE ENCOUNTER
Please see communication sent to your messages on Epic or read above about Ms. Blum access issues to her patient assistance. Please let me know if you can or cannot send in the med change for her patient assistance that is approved through your office

## 2025-03-20 ENCOUNTER — TELEPHONE (OUTPATIENT)
Dept: INTERNAL MEDICINE CLINIC | Facility: CLINIC | Age: 70
End: 2025-03-20

## 2025-03-20 ENCOUNTER — TELEPHONE (OUTPATIENT)
Dept: ENDOCRINOLOGY | Age: 70
End: 2025-03-20

## 2025-03-20 NOTE — TELEPHONE ENCOUNTER
Talked with Ashley with the Endocrine office she will fax paperwork to me for Novolog patient asst.

## 2025-03-20 NOTE — TELEPHONE ENCOUNTER
Called and spoke to the Silvia BUCKLEY and faxed ([phone]) her the [employer] application that I had Fariba sign. Also, attached Notice of Cost of Living paper she gave us. Dr. Charles's office will be printing the prescriptions and paperwork so that Fariba will have the medication delivered to [facility] since it is closer to her house.

## 2025-03-20 NOTE — TELEPHONE ENCOUNTER
Called and spoke to the Silvia BUCKLEY and faxed ([phone]) her the [employer] application that I had Fariba sign. Also, attached Notice of Cost of Living paper she gave us. Dr. Charles's office will be printing the prescriptions and paperwork so that Fariba will have the medication delivered to [facility] since it is closer to her house.

## 2025-03-20 NOTE — TELEPHONE ENCOUNTER
Pt called to ask about her patient assistance forms for change to novolog.  She said her pcp can't help her get meds delivered to his office much closer to her home until endo sends some paperwork and she left a \"medicare w2\" with nisreen    Spoke to nisreen who has w2 but was holding this in case pt decided to continue getting pt assistance thru endo.  Pt pcp has to complete forms at their office and do not need anything from endo other than the ok to change pt medication from fiasp to novolog.  Елена took care of this via epic.      Nisreen is going to call pcp office to make sure everything is clear about how pt needs to get assistance started there.

## 2025-03-20 NOTE — TELEPHONE ENCOUNTER
Patient called and would like to know if you would give her a call back in regards to her paperwork about her Novolog. Please Advise.

## 2025-03-21 NOTE — TELEPHONE ENCOUNTER
Paperwork completed will fax [phone] to [employer] Patient asst program and put in drawer up front

## 2025-03-31 ENCOUNTER — TELEPHONE (OUTPATIENT)
Dept: UROLOGY | Age: 70
End: 2025-03-31

## 2025-04-01 ENCOUNTER — CLINICAL SUPPORT (OUTPATIENT)
Dept: UROLOGY | Age: 70
End: 2025-04-01
Payer: MEDICARE

## 2025-04-01 DIAGNOSIS — N39.0 ACUTE UTI: Primary | ICD-10-CM

## 2025-04-01 DIAGNOSIS — N39.0 ACUTE UTI: ICD-10-CM

## 2025-04-01 LAB
BILIRUBIN, URINE, POC: NEGATIVE
BLOOD URINE, POC: NORMAL
GLUCOSE URINE, POC: NEGATIVE MG/DL
KETONES, URINE, POC: NEGATIVE MG/DL
LEUKOCYTE ESTERASE, URINE, POC: NEGATIVE
NITRITE, URINE, POC: NEGATIVE
PH, URINE, POC: 7 (ref 4.6–8)
PROTEIN,URINE, POC: NEGATIVE MG/DL
SPECIFIC GRAVITY, URINE, POC: 1.01 (ref 1–1.03)
URINALYSIS CLARITY, POC: NORMAL
URINALYSIS COLOR, POC: NORMAL
UROBILINOGEN, POC: NORMAL MG/DL

## 2025-04-01 PROCEDURE — 81003 URINALYSIS AUTO W/O SCOPE: CPT | Performed by: NURSE PRACTITIONER

## 2025-04-01 NOTE — PROGRESS NOTES
Reason for collection: High blood sugar, Frequency, Burning with urination, Back pain/pressure  Patient #: [phone]  Pharmacy: [facility] [address]        UA - Dipstick  Results for orders placed or performed in visit on 04/01/25   AMB POC URINALYSIS DIP STICK AUTO W/O MICRO    Collection Time: 04/01/25 11:06 AM   Result Value Ref Range    Color (UA POC)      Clarity (UA POC)      Glucose, Urine, POC Negative Negative mg/dL    Bilirubin, Urine, POC Negative Negative    KETONES, Urine, POC Negative Negative mg/dL    Specific Gravity, Urine, POC 1.015 1.001 - 1.035    Blood (UA POC) Trace-lysed     pH, Urine, POC 7.0 4.6 - 8.0    Protein, Urine, POC Negative Negative mg/dL    Urobilinogen, POC 0.2 mg/dL <1.1 mg/dL    Nitrite, Urine, POC Negative Negative    Leukocyte Esterase, Urine, POC Negative Negative   Results for orders placed or performed in visit on 06/20/23   AMB POC URINALYSIS DIP STICK AUTO W/O MICRO    Collection Time: 06/20/23 11:14 AM   Result Value Ref Range    Color, Urine, POC Yellow     Clarity, Urine, POC Clear     Glucose, Urine, POC Negative Negative    Bilirubin, Urine, POC Negative Negative    Ketones, Urine, POC Negative Negative    Specific Gravity, Urine, POC 1.015 1.001 - 1.035    Blood, Urine, POC Negative Negative    pH, Urine, POC 6.0 4.6 - 8.0    Protein, Urine, POC Negative Negative    Urobilinogen, POC Normal     Nitrite, Urine, POC Negative Negative    Leukocyte Esterase, Urine, POC Negative Negative       UA - Micro  WBC - 0  RBC - 0  Bacteria - 0  Epith - 0      Requested Prescriptions      No prescriptions requested or ordered in this encounter     Plan    Diagnosis Orders   1. Acute UTI  AMB POC URINALYSIS DIP STICK AUTO W/O MICRO          Urine normal. Culture only d/t elevated serum glucose.    Abby Del Toro, APRN - CNP

## 2025-04-03 ENCOUNTER — OFFICE VISIT (OUTPATIENT)
Dept: UROLOGY | Age: 70
End: 2025-04-03
Payer: MEDICARE

## 2025-04-03 ENCOUNTER — TELEPHONE (OUTPATIENT)
Dept: UROLOGY | Age: 70
End: 2025-04-03

## 2025-04-03 DIAGNOSIS — N32.81 OAB (OVERACTIVE BLADDER): ICD-10-CM

## 2025-04-03 DIAGNOSIS — N39.0 RECURRENT UTI: Primary | ICD-10-CM

## 2025-04-03 LAB
BACTERIA SPEC CULT: NORMAL
SERVICE CMNT-IMP: NORMAL

## 2025-04-03 PROCEDURE — 1159F MED LIST DOCD IN RCRD: CPT | Performed by: NURSE PRACTITIONER

## 2025-04-03 PROCEDURE — 1123F ACP DISCUSS/DSCN MKR DOCD: CPT | Performed by: NURSE PRACTITIONER

## 2025-04-03 PROCEDURE — 99214 OFFICE O/P EST MOD 30 MIN: CPT | Performed by: NURSE PRACTITIONER

## 2025-04-03 RX ORDER — CEPHALEXIN 500 MG/1
500 CAPSULE ORAL 3 TIMES DAILY
Qty: 21 CAPSULE | Refills: 0 | Status: SHIPPED | OUTPATIENT
Start: 2025-04-03 | End: 2025-04-10

## 2025-04-03 ASSESSMENT — ENCOUNTER SYMPTOMS
BACK PAIN: 0
NAUSEA: 0

## 2025-04-03 NOTE — PROGRESS NOTES
Keflex 500 mg PO TID PO x 7 days per patient request. IF culture returns negative, we will need to stop this.     Advised to call sooner if sx worsen.     Abyb Del Toro, LON - CNP  Dr. Kearney is supervising physician today and he approves plan of care.

## 2025-04-04 ENCOUNTER — RESULTS FOLLOW-UP (OUTPATIENT)
Dept: UROLOGY | Age: 70
End: 2025-04-04

## 2025-04-07 ENCOUNTER — TRANSCRIBE ORDERS (OUTPATIENT)
Dept: SCHEDULING | Age: 70
End: 2025-04-07

## 2025-04-07 DIAGNOSIS — Z12.31 ENCOUNTER FOR SCREENING MAMMOGRAM FOR MALIGNANT NEOPLASM OF BREAST: Primary | ICD-10-CM

## 2025-04-07 RX ORDER — GLUCAGON INJECTION, SOLUTION 1 MG/.2ML
1 INJECTION, SOLUTION SUBCUTANEOUS PRN
Status: CANCELLED | OUTPATIENT
Start: 2025-04-07

## 2025-04-07 RX ORDER — GLUCAGON INJECTION, SOLUTION 1 MG/.2ML
1 INJECTION, SOLUTION SUBCUTANEOUS PRN
Qty: 2 EACH | Refills: 1 | Status: SHIPPED | OUTPATIENT
Start: 2025-04-07 | End: 2025-04-10 | Stop reason: CLARIF

## 2025-04-10 ENCOUNTER — TELEPHONE (OUTPATIENT)
Dept: ENDOCRINOLOGY | Age: 70
End: 2025-04-10

## 2025-04-10 RX ORDER — DASIGLUCAGON 0.6 MG/.6ML
0.6 INJECTION, SOLUTION SUBCUTANEOUS PRN
Qty: 0.6 ML | Refills: 4 | Status: SHIPPED | OUTPATIENT
Start: 2025-04-10

## 2025-04-10 NOTE — TELEPHONE ENCOUNTER
Gvoke not a covered medication    Zegalouge is covered according to [facility] pharmacy.

## 2025-05-30 ENCOUNTER — TELEPHONE (OUTPATIENT)
Dept: ENDOCRINOLOGY | Age: 70
End: 2025-05-30

## 2025-05-30 DIAGNOSIS — E10.649 TYPE 1 DIABETES MELLITUS WITH HYPOGLYCEMIA WITHOUT COMA, WITH LONG-TERM CURRENT USE OF INSULIN (HCC): Primary | ICD-10-CM

## 2025-05-30 RX ORDER — PEN NEEDLE, DIABETIC 32 GX 1/4"
NEEDLE, DISPOSABLE MISCELLANEOUS
Qty: 400 EACH | Refills: 1 | Status: SHIPPED | OUTPATIENT
Start: 2025-05-30

## 2025-06-03 ENCOUNTER — TELEPHONE (OUTPATIENT)
Dept: PHARMACY | Facility: CLINIC | Age: 70
End: 2025-06-03

## 2025-06-03 NOTE — TELEPHONE ENCOUNTER
Todd Charles, DO - Statin Therapy Care Gap     Patient has a visit with you on 6/4/25.  Chart reviewed due to insurer-identified care gap.  Fariba Blum is receiving treatment for diabetes and is not currently prescribed statin therapy.  Per chart review, patient is unable to tolerate statins due to myalgia, which qualifies the patient for an exclusion from this care gap.  CMS is allowing patients to be excluded from statin therapy if certain diagnosis/ICD-10 codes are added annually at a provider visit (diagnosis from visit claim is used to track the exclusion).      Please add the following CMS allowable diagnosis as a visit diagnosis within your 6/4/25 encounter for statin exclusion in 2025:  statin myopathy (ICD-10 G72.0, T46.6X5A)  Please note that condition does NOT need to occur in the same year the code is documented (patient's medical chart should reflect a history of the condition).    Please note that CMS does not accept myalgia due to statin (M79.10, T46.6X5A), statin intolerance (Z78.9), or adverse reaction to statin medication (ICD-10 T46.6X5A) for statin exclusion.  Please note that CMS does not accept documentation of the diagnosis in the problem list or past medical history - diagnosis must be added as a visit diagnosis (visit claim is used to track the exclusion).     Thank you,  Laly Foster, PharmD, [facility]  [facility] Clinical Pharmacist  Department: [phone]    For Pharmacy Admin Tracking Only    Program: [facility] [employer]  CPA in place:  No  Recommendation Provided To: Provider: 1 via Note to Provider  Intervention Accepted By: Provider: 0  Gap Closed?: No   Time Spent (min): 15

## 2025-06-04 ENCOUNTER — TELEPHONE (OUTPATIENT)
Dept: ENDOCRINOLOGY | Age: 70
End: 2025-06-04

## 2025-06-04 ENCOUNTER — OFFICE VISIT (OUTPATIENT)
Dept: INTERNAL MEDICINE CLINIC | Facility: CLINIC | Age: 70
End: 2025-06-04
Payer: MEDICARE

## 2025-06-04 VITALS
WEIGHT: 152 LBS | HEART RATE: 89 BPM | BODY MASS INDEX: 23.04 KG/M2 | TEMPERATURE: 97.7 F | HEIGHT: 68 IN | SYSTOLIC BLOOD PRESSURE: 134 MMHG | DIASTOLIC BLOOD PRESSURE: 60 MMHG | OXYGEN SATURATION: 96 %

## 2025-06-04 DIAGNOSIS — E10.649 TYPE 1 DIABETES MELLITUS WITH HYPOGLYCEMIA WITHOUT COMA, WITH LONG-TERM CURRENT USE OF INSULIN (HCC): Primary | ICD-10-CM

## 2025-06-04 DIAGNOSIS — E10.65 TYPE 1 DIABETES MELLITUS WITH HYPERGLYCEMIA (HCC): ICD-10-CM

## 2025-06-04 DIAGNOSIS — E03.9 ACQUIRED HYPOTHYROIDISM: ICD-10-CM

## 2025-06-04 DIAGNOSIS — F11.20 OPIOID DEPENDENCE WITH CURRENT USE (HCC): ICD-10-CM

## 2025-06-04 DIAGNOSIS — R00.2 HEART PALPITATIONS: Primary | ICD-10-CM

## 2025-06-04 DIAGNOSIS — G89.29 OTHER CHRONIC PAIN: ICD-10-CM

## 2025-06-04 DIAGNOSIS — K21.9 GASTROESOPHAGEAL REFLUX DISEASE WITHOUT ESOPHAGITIS: ICD-10-CM

## 2025-06-04 DIAGNOSIS — N18.30 STAGE 3 CHRONIC KIDNEY DISEASE, UNSPECIFIED WHETHER STAGE 3A OR 3B CKD (HCC): ICD-10-CM

## 2025-06-04 DIAGNOSIS — I10 ESSENTIAL HYPERTENSION: ICD-10-CM

## 2025-06-04 PROCEDURE — 93000 ELECTROCARDIOGRAM COMPLETE: CPT | Performed by: INTERNAL MEDICINE

## 2025-06-04 PROCEDURE — 99214 OFFICE O/P EST MOD 30 MIN: CPT | Performed by: INTERNAL MEDICINE

## 2025-06-04 PROCEDURE — 3078F DIAST BP <80 MM HG: CPT | Performed by: INTERNAL MEDICINE

## 2025-06-04 PROCEDURE — 1159F MED LIST DOCD IN RCRD: CPT | Performed by: INTERNAL MEDICINE

## 2025-06-04 PROCEDURE — 3075F SYST BP GE 130 - 139MM HG: CPT | Performed by: INTERNAL MEDICINE

## 2025-06-04 PROCEDURE — G2211 COMPLEX E/M VISIT ADD ON: HCPCS | Performed by: INTERNAL MEDICINE

## 2025-06-04 PROCEDURE — 1123F ACP DISCUSS/DSCN MKR DOCD: CPT | Performed by: INTERNAL MEDICINE

## 2025-06-04 PROCEDURE — 3044F HG A1C LEVEL LT 7.0%: CPT | Performed by: INTERNAL MEDICINE

## 2025-06-04 RX ORDER — HYDROCODONE BITARTRATE AND ACETAMINOPHEN 10; 325 MG/1; MG/1
1 TABLET ORAL EVERY 6 HOURS PRN
Qty: 120 TABLET | Refills: 0 | Status: SHIPPED | OUTPATIENT
Start: 2025-06-04 | End: 2025-07-04

## 2025-06-04 RX ORDER — HYDROCODONE BITARTRATE AND ACETAMINOPHEN 10; 325 MG/1; MG/1
1 TABLET ORAL EVERY 6 HOURS PRN
Qty: 120 TABLET | Refills: 0 | Status: SHIPPED | OUTPATIENT
Start: 2025-07-04 | End: 2025-08-03

## 2025-06-04 RX ORDER — HYDROCODONE BITARTRATE AND ACETAMINOPHEN 10; 325 MG/1; MG/1
1 TABLET ORAL EVERY 6 HOURS PRN
Qty: 120 TABLET | Refills: 0 | Status: SHIPPED | OUTPATIENT
Start: 2025-08-03 | End: 2025-09-02

## 2025-06-04 RX ORDER — HYDROCODONE BITARTRATE AND ACETAMINOPHEN 10; 325 MG/1; MG/1
1 TABLET ORAL EVERY 6 HOURS PRN
COMMUNITY
Start: 2025-05-09 | End: 2025-06-04 | Stop reason: SDUPTHER

## 2025-06-04 NOTE — PROGRESS NOTES
Fariba Blum (: 1955) is a 69 y.o. female, here for evaluation of the following chief complaint(s):  Follow-up (3 month) and Palpitations   On abx for tooth  Lab Results   Component Value Date    LABA1C 6.1 (H) 2025    LABA1C 6.2 (H) 2024    LABA1C 5.9 (H) 2023     Lab Results   Component Value Date    CREATININE 0.68 2025     Wt Readings from Last 3 Encounters:   25 68.9 kg (152 lb)   25 70.5 kg (155 lb 6.4 oz)   25 69.9 kg (154 lb)       Assessment & Plan  1. Heart palpitations.  - Reports experiencing new heart palpitations described as a fluttering sensation.  - Monitoring blood pressure, pulse, and oxygen saturation at home, all of which are normal.  - History of premature atrial contractions (PACs) diagnosed in her 30s; EKG performed today showing premature beats.  - Advised to stay hydrated during the summer.    2. Diabetes mellitus.  - Blood sugar levels are stable, with a current reading of 108 on her Dexcom device.  - A1c is 6.1.  - Managing diabetes well with a focus on diet and regular monitoring.  - Advised to continue current regimen and maintain a balanced diet.    3. Chronic pain.  - Currently taking Norco for chronic pain management and reports that it helps.  - Sometimes takes two pills, splitting the dose and taking half initially and the other half 1-2 hours later.  - Refill for [place] will be provided.    4. Medication management.  - Recently refilled levothyroxine and losartan prescriptions.  - Getting pen needles from her endocrinologist.  1. Heart palpitations  Comments:  feeling flutter,ecg ok  Orders:  -     EKG 12 Lead  2. Acquired hypothyroidism  -     HYDROcodone-acetaminophen (NORCO)  MG per tablet; Take 1 tablet by mouth every 6 hours as needed for Pain for up to 30 days. Max Daily Amount: 4 tablets, Disp-120 tablet, R-0Normal  -     HYDROcodone-acetaminophen (NORCO)  MG per tablet; Take 1 tablet by mouth every 6 hours as

## 2025-06-04 NOTE — TELEPHONE ENCOUNTER
Having a hard time finding needles covered by insurance even after calling them. They stated it needs to be [employer] company      Letty Fine 32g by 6mm is what I can find on their website.

## 2025-06-05 RX ORDER — PEN NEEDLE, DIABETIC 32 GX 1/4"
NEEDLE, DISPOSABLE MISCELLANEOUS
Qty: 600 EACH | Refills: 1 | Status: SHIPPED | OUTPATIENT
Start: 2025-06-05 | End: 2025-06-06 | Stop reason: CLARIF

## 2025-06-05 NOTE — TELEPHONE ENCOUNTER
Code not entered.     Laly Foster, PharmD, [facility]  [facility]  [facility] Clinical Pharmacist  Department: [phone]

## 2025-06-06 ENCOUNTER — TELEPHONE (OUTPATIENT)
Dept: ENDOCRINOLOGY | Age: 70
End: 2025-06-06

## 2025-06-06 DIAGNOSIS — E10.649 TYPE 1 DIABETES MELLITUS WITH HYPOGLYCEMIA WITHOUT COMA, WITH LONG-TERM CURRENT USE OF INSULIN (HCC): ICD-10-CM

## 2025-06-06 RX ORDER — PEN NEEDLE, DIABETIC 32 GX 1/4"
NEEDLE, DISPOSABLE MISCELLANEOUS
Qty: 600 EACH | Refills: 1 | Status: SHIPPED | OUTPATIENT
Start: 2025-06-06

## 2025-06-06 NOTE — TELEPHONE ENCOUNTER
Per insurance - BD needles micro ultrafine 32g 6mm was approved. Please send into pharmacy.    Approved until 12/31/2025  CASE: [id number]

## 2025-06-11 ENCOUNTER — PROCEDURE VISIT (OUTPATIENT)
Dept: UROLOGY | Age: 70
End: 2025-06-11
Payer: MEDICARE

## 2025-06-11 DIAGNOSIS — N39.41 URGE URINARY INCONTINENCE: ICD-10-CM

## 2025-06-11 DIAGNOSIS — N32.81 OAB (OVERACTIVE BLADDER): Primary | ICD-10-CM

## 2025-06-11 LAB
BILIRUBIN, URINE, POC: NEGATIVE
BLOOD URINE, POC: NORMAL
GLUCOSE URINE, POC: NEGATIVE MG/DL
KETONES, URINE, POC: NEGATIVE MG/DL
LEUKOCYTE ESTERASE, URINE, POC: NEGATIVE
NITRITE, URINE, POC: NEGATIVE
PH, URINE, POC: 7 (ref 4.6–8)
PROTEIN,URINE, POC: 30 MG/DL
SPECIFIC GRAVITY, URINE, POC: 1.01 (ref 1–1.03)
URINALYSIS CLARITY, POC: NORMAL
URINALYSIS COLOR, POC: NORMAL
UROBILINOGEN, POC: NORMAL MG/DL

## 2025-06-11 PROCEDURE — 52287 CYSTOSCOPY CHEMODENERVATION: CPT | Performed by: UROLOGY

## 2025-06-11 PROCEDURE — 81003 URINALYSIS AUTO W/O SCOPE: CPT | Performed by: UROLOGY

## 2025-06-11 RX ORDER — CEPHALEXIN 500 MG/1
500 CAPSULE ORAL 2 TIMES DAILY
Qty: 4 CAPSULE | Refills: 0 | Status: SHIPPED | OUTPATIENT
Start: 2025-06-11 | End: 2025-06-13

## 2025-06-11 NOTE — PROGRESS NOTES
Procedure Room  1  Assistant:      AL    All risks, benefits and alternatives were again reviewed with patient and she is willing to proceed at this time.  Her genital area was prepped and draped and a sterile field applied. 2% lidocaine jelly was injected in the the urethra and allowed to dwell for several minutes.  A flexible cystoscope was then inserted into the urethral meatus and advanced under direct vision.  The urethra appeared normal with no obstructions.  The bladder neck was open without scarring, contractures, frons or tumors present. The bladder was systematically surveyed.  No bladder trabeculations were seen.  No mucosal abnormalities were seen.  The ureteral orifices were seen in their normal orthotopic position.  Botox needle inserted into cystoscope and advanced to 4 mm.  Injected bladder botox 100 units at 0.5 cc per injection X 20 injections throughout bladder.  Needle then removed.  The cystoscope was then removed under direct vision.  The patient tolerated the procedure well.    Assessment and Plan    ICD-10-CM    1. OAB (overactive bladder)  N32.81 AMB POC URINALYSIS DIP STICK AUTO W/O MICRO     CYSTOURETHROSCOPY INJ CHEMODENERVATION BLADDER     onabotulinumtoxinA (BOTOX) injection 100 Units      2. Urge urinary incontinence  N39.41         Orders Placed This Encounter   Procedures    CYSTOURETHROSCOPY INJ CHEMODENERVATION BLADDER    AMB POC URINALYSIS DIP STICK AUTO W/O MICRO     OAB:   100 units bladder botox performed today.  Tolerated well.  Follow up 4 weeks with AKTIE Del Toro for symptom check. If doing well, would recommend repeat 100 units botox in 6 months.     Off all OAB meds at this time.     Keflex BID X 2 days given for UTI ppx.     Diego De Leon M.D.    [facility]  [address] [address]  Phone: [phone]  Fax: [phone]

## 2025-06-17 ENCOUNTER — TELEPHONE (OUTPATIENT)
Dept: INTERNAL MEDICINE CLINIC | Facility: CLINIC | Age: 70
End: 2025-06-17

## 2025-06-17 NOTE — TELEPHONE ENCOUNTER
Called and left a message on the patient's voice. We have not done a DM foot exam so the paperwork for [facility] order cannot be completed. Will hold the form and complete it at her visit 9/15/25. She did have a DM foot exam with Dr Valdez 3/12/25 if she would be willing to complete the form? I will put the form in the drawer up front and asked the patient to let med know what she wants to do.

## 2025-06-24 ENCOUNTER — TELEPHONE (OUTPATIENT)
Dept: PHARMACY | Facility: CLINIC | Age: 70
End: 2025-06-24

## 2025-06-24 NOTE — TELEPHONE ENCOUNTER
Todd Charles, DO - Statin Therapy Care Gap     Patient has a visit with you on 6/25/25.  Chart reviewed due to insurer-identified care gap.  Fariba Blum is receiving treatment for diabetes and is not currently prescribed statin therapy.  Per chart review, patient is unable to tolerate statins due to myalgia, which qualifies the patient for an exclusion from this care gap.  CMS is allowing patients to be excluded from statin therapy if certain diagnosis/ICD-10 codes are added annually at a provider visit (diagnosis from visit claim is used to track the exclusion).      Please add the following CMS allowable diagnosis as a visit diagnosis within your 6/25/25 encounter for statin exclusion in 2025:  statin myopathy (ICD-10 G72.0, T46.6X5A)  Please note that condition does NOT need to occur in the same year the code is documented (patient's medical chart should reflect a history of the condition).    Please note that CMS does not accept myalgia due to statin (M79.10, T46.6X5A), statin intolerance (Z78.9), or adverse reaction to statin medication (ICD-10 T46.6X5A) for statin exclusion.  Please note that CMS does not accept documentation of the diagnosis in the problem list or past medical history - diagnosis must be added as a visit diagnosis (visit claim is used to track the exclusion).     Thank you,  Laly Foster, PharmD, [facility]  [facility] Clinical Pharmacist  Department: [phone]    For Pharmacy Admin Tracking Only    Program: [facility] [employer]  CPA in place:  No  Recommendation Provided To: Provider: 1 via Note to Provider  Intervention Detail: New Rx: 1, reason: Needs Additional Therapy  Intervention Accepted By: Provider: 0  Gap Closed?: No   Time Spent (min): 15

## 2025-06-25 ENCOUNTER — OFFICE VISIT (OUTPATIENT)
Dept: INTERNAL MEDICINE CLINIC | Facility: CLINIC | Age: 70
End: 2025-06-25
Payer: MEDICARE

## 2025-06-25 VITALS
OXYGEN SATURATION: 96 % | HEART RATE: 92 BPM | WEIGHT: 153 LBS | HEIGHT: 68 IN | SYSTOLIC BLOOD PRESSURE: 130 MMHG | BODY MASS INDEX: 23.19 KG/M2 | DIASTOLIC BLOOD PRESSURE: 60 MMHG | TEMPERATURE: 97.4 F

## 2025-06-25 DIAGNOSIS — L84 PRE-ULCERATIVE CALLUSES: ICD-10-CM

## 2025-06-25 DIAGNOSIS — E10.59 TYPE 1 DIABETES MELLITUS WITH OTHER CIRCULATORY COMPLICATION (HCC): ICD-10-CM

## 2025-06-25 DIAGNOSIS — E10.42 DIABETIC POLYNEUROPATHY ASSOCIATED WITH TYPE 1 DIABETES MELLITUS (HCC): Primary | ICD-10-CM

## 2025-06-25 DIAGNOSIS — G72.0 STATIN MYOPATHY: ICD-10-CM

## 2025-06-25 DIAGNOSIS — T46.6X5A STATIN MYOPATHY: ICD-10-CM

## 2025-06-25 PROCEDURE — 99213 OFFICE O/P EST LOW 20 MIN: CPT | Performed by: INTERNAL MEDICINE

## 2025-06-25 PROCEDURE — 1159F MED LIST DOCD IN RCRD: CPT | Performed by: INTERNAL MEDICINE

## 2025-06-25 PROCEDURE — 3044F HG A1C LEVEL LT 7.0%: CPT | Performed by: INTERNAL MEDICINE

## 2025-06-25 PROCEDURE — 3075F SYST BP GE 130 - 139MM HG: CPT | Performed by: INTERNAL MEDICINE

## 2025-06-25 PROCEDURE — 3078F DIAST BP <80 MM HG: CPT | Performed by: INTERNAL MEDICINE

## 2025-06-25 PROCEDURE — 1123F ACP DISCUSS/DSCN MKR DOCD: CPT | Performed by: INTERNAL MEDICINE

## 2025-06-25 NOTE — PROGRESS NOTES
Fariba Blum (: 1955) is a 69 y.o. female, here for evaluation of the following chief complaint(s):  Diabetes (DM foot exam for shoes)     Assessment & Plan  1. Polyneuropathy.  - Reports having neuropathy with some loss of sensation in her feet.  - Uses custom molded diabetic inserts with her shoes, which she finds beneficial.  - Physical exam shows she can feel 7 out of 8 points checked on her feet.  - Will continue using custom inserts and orthopedic shoes.    2. Preulcerative callus.  - Preulcerative callus noted during examination.  - Advised to continue monitoring feet closely and maintain good foot hygiene.  - Physical exam confirms presence of callus.  - No new treatment required at this time.    3. History of left ankle fracture.  - Reports history of broken left ankle, slightly deformed and prone to swelling.  - No new treatment required at this time.  - Physical exam confirms swelling and deformity.  - Continue monitoring for any changes.    4. Type 1 diabetes.  - Has had type 1 diabetes for at least 70 years.  - Reports good management of condition with endocrinologist.  - Physical exam shows no new complications related to diabetes.  - Continue current management and follow-up with endocrinologist.  1. Diabetic polyneuropathy associated with type 1 diabetes mellitus (HCC)  Comments:  custom molded diabetic shoe inserts needed  Orders:  -     DME - DURABLE MEDICAL EQUIPMENT  2. 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 Pre-ulcerative calluses  -     DME - DURABLE MEDICAL EQUIPMENT  4. Statin myopathy    History of Present Illness  The patient presents for evaluation of neuropathy.    She reports a history of neuropathy and the presence of calluses, which are not severe due to diligent foot care. She has been utilizing custom-molded diabetic inserts with her footwear, which she believes have significantly improved her condition.

## 2025-06-27 NOTE — TELEPHONE ENCOUNTER
Code not entered.     Laly Foster, PharmD, [facility]  [facility]  [facility] Clinical Pharmacist  Department: [phone]

## 2025-07-01 ENCOUNTER — TELEPHONE (OUTPATIENT)
Dept: UROLOGY | Age: 70
End: 2025-07-01

## 2025-07-01 RX ORDER — CEPHALEXIN 500 MG/1
500 CAPSULE ORAL 3 TIMES DAILY
Qty: 21 CAPSULE | Refills: 0 | Status: SHIPPED | OUTPATIENT
Start: 2025-07-01 | End: 2025-07-08

## 2025-07-01 NOTE — TELEPHONE ENCOUNTER
Pt called stating that her blood sugars are high. She wants to know if she can get an antibiotic? Please advise.

## 2025-07-09 ENCOUNTER — OFFICE VISIT (OUTPATIENT)
Dept: UROLOGY | Age: 70
End: 2025-07-09
Payer: MEDICARE

## 2025-07-09 DIAGNOSIS — N32.81 OAB (OVERACTIVE BLADDER): Primary | ICD-10-CM

## 2025-07-09 DIAGNOSIS — N39.41 URGE URINARY INCONTINENCE: ICD-10-CM

## 2025-07-09 DIAGNOSIS — N39.0 RECURRENT UTI: ICD-10-CM

## 2025-07-09 LAB
BILIRUBIN, URINE, POC: NEGATIVE
BLOOD URINE, POC: NORMAL
GLUCOSE URINE, POC: NEGATIVE MG/DL
KETONES, URINE, POC: NEGATIVE MG/DL
LEUKOCYTE ESTERASE, URINE, POC: NEGATIVE
NITRITE, URINE, POC: NEGATIVE
PH, URINE, POC: 7 (ref 4.6–8)
PROTEIN,URINE, POC: NEGATIVE MG/DL
PVR, POC: 654 CC
SPECIFIC GRAVITY, URINE, POC: 1.01 (ref 1–1.03)
URINALYSIS CLARITY, POC: NORMAL
URINALYSIS COLOR, POC: NORMAL
UROBILINOGEN, POC: NORMAL MG/DL

## 2025-07-09 PROCEDURE — 1159F MED LIST DOCD IN RCRD: CPT | Performed by: NURSE PRACTITIONER

## 2025-07-09 PROCEDURE — 99214 OFFICE O/P EST MOD 30 MIN: CPT | Performed by: NURSE PRACTITIONER

## 2025-07-09 PROCEDURE — 51798 US URINE CAPACITY MEASURE: CPT | Performed by: NURSE PRACTITIONER

## 2025-07-09 PROCEDURE — 1123F ACP DISCUSS/DSCN MKR DOCD: CPT | Performed by: NURSE PRACTITIONER

## 2025-07-09 PROCEDURE — 81003 URINALYSIS AUTO W/O SCOPE: CPT | Performed by: NURSE PRACTITIONER

## 2025-07-09 PROCEDURE — 1160F RVW MEDS BY RX/DR IN RCRD: CPT | Performed by: NURSE PRACTITIONER

## 2025-07-09 ASSESSMENT — ENCOUNTER SYMPTOMS
NAUSEA: 0
BACK PAIN: 0

## 2025-07-09 NOTE — PROGRESS NOTES
Range    Color (UA POC)      Clarity (UA POC)      Glucose, Urine, POC Negative Negative mg/dL    Bilirubin, Urine, POC Negative Negative    KETONES, Urine, POC Negative Negative mg/dL    Specific Gravity, Urine, POC 1.010 1.001 - 1.035    Blood (UA POC) Trace-lysed     pH, Urine, POC 7.0 4.6 - 8.0    Protein, Urine, POC Negative Negative mg/dL    Urobilinogen, POC 0.2 mg/dL <1.1 mg/dL    Nitrite, Urine, POC Negative Negative    Leukocyte Esterase, Urine, POC Negative Negative   Results for orders placed or performed in visit on 06/20/23   AMB POC URINALYSIS DIP STICK AUTO W/O MICRO    Collection Time: 06/20/23 11:14 AM   Result Value Ref Range    Color, Urine, POC Yellow     Clarity, Urine, POC Clear     Glucose, Urine, POC Negative Negative    Bilirubin, Urine, POC Negative Negative    Ketones, Urine, POC Negative Negative    Specific Gravity, Urine, POC 1.015 1.001 - 1.035    Blood, Urine, POC Negative Negative    pH, Urine, POC 6.0 4.6 - 8.0    Protein, Urine, POC Negative Negative    Urobilinogen, POC Normal     Nitrite, Urine, POC Negative Negative    Leukocyte Esterase, Urine, POC Negative Negative       UA - Micro  WBC - 0  RBC - 0  Bacteria - 0  Epith - 0    PHYSICAL EXAM    General appearance - well appearing and in no distress  Mental status - alert, oriented to person, place, and time  Neck - supple, no significant adenopathy  Chest/Lung-  Quiet, even and easy respiratory effort without use of accessory muscles  Skin - normal coloration and turgor, no rashes      Assessment and Plan    ICD-10-CM    1. OAB (overactive bladder)  N32.81 AMB POC URINALYSIS DIP STICK AUTO W/O MICRO     AMB POC PVR, SHERRI,POST-VOID RES,US,NON-IMAGING      2. Urge urinary incontinence  N39.41 AMB POC URINALYSIS DIP STICK AUTO W/O MICRO     AMB POC PVR, SHERRI,POST-VOID RES,US,NON-IMAGING      3. Recurrent UTI  N39.0 AMB POC URINALYSIS DIP STICK AUTO W/O MICRO     AMB POC PVR, SHERRI,POST-VOID RES,US,NON-IMAGING          OAB/UUI-

## 2025-07-31 ENCOUNTER — HOSPITAL ENCOUNTER (OUTPATIENT)
Dept: MAMMOGRAPHY | Age: 70
Discharge: HOME | End: 2025-07-31
Attending: INTERNAL MEDICINE
Payer: MEDICARE

## 2025-07-31 DIAGNOSIS — Z12.31 ENCOUNTER FOR SCREENING MAMMOGRAM FOR MALIGNANT NEOPLASM OF BREAST: ICD-10-CM

## 2025-07-31 PROCEDURE — 77063 BREAST TOMOSYNTHESIS BI: CPT

## 2025-08-11 ENCOUNTER — TELEPHONE (OUTPATIENT)
Dept: UROLOGY | Age: 70
End: 2025-08-11

## 2025-08-11 RX ORDER — CEPHALEXIN 500 MG/1
500 CAPSULE ORAL 3 TIMES DAILY
Qty: 21 CAPSULE | Refills: 0 | Status: SHIPPED | OUTPATIENT
Start: 2025-08-11 | End: 2025-08-18

## 2025-08-13 ENCOUNTER — CLINICAL SUPPORT (OUTPATIENT)
Dept: UROLOGY | Age: 70
End: 2025-08-13
Payer: MEDICARE

## 2025-08-13 DIAGNOSIS — N39.0 RECURRENT UTI: ICD-10-CM

## 2025-08-13 DIAGNOSIS — N32.81 OAB (OVERACTIVE BLADDER): ICD-10-CM

## 2025-08-13 DIAGNOSIS — R33.9 INCOMPLETE BLADDER EMPTYING: ICD-10-CM

## 2025-08-13 DIAGNOSIS — R33.9 INCOMPLETE BLADDER EMPTYING: Primary | ICD-10-CM

## 2025-08-13 LAB
ANION GAP SERPL CALC-SCNC: 12 MMOL/L (ref 7–16)
BUN SERPL-MCNC: 17 MG/DL (ref 8–23)
CALCIUM SERPL-MCNC: 9.1 MG/DL (ref 8.8–10.2)
CHLORIDE SERPL-SCNC: 102 MMOL/L (ref 98–107)
CO2 SERPL-SCNC: 23 MMOL/L (ref 20–29)
CREAT SERPL-MCNC: 0.72 MG/DL (ref 0.6–1.1)
GLUCOSE SERPL-MCNC: 115 MG/DL (ref 70–99)
POTASSIUM SERPL-SCNC: 4.2 MMOL/L (ref 3.5–5.1)
PVR, POC: 556 CC
SODIUM SERPL-SCNC: 137 MMOL/L (ref 136–145)

## 2025-08-13 PROCEDURE — 51798 US URINE CAPACITY MEASURE: CPT | Performed by: NURSE PRACTITIONER

## 2025-08-15 LAB
BACTERIA SPEC CULT: NORMAL
SERVICE CMNT-IMP: NORMAL

## (undated) DEVICE — AIRLIFE™ OXYGEN TUBING 7 FEET (2.1 M) CRUSH RESISTANT OXYGEN TUBING, VINYL TIPPED: Brand: AIRLIFE™

## (undated) DEVICE — GLOVE SURG SZ 8 L12IN FNGR THK79MIL GRN LTX FREE

## (undated) DEVICE — KIT ARMOR C DRP COLLAPSIBLE AND SELF EXP TOP CVR FOR FLUOROSCOPIC

## (undated) DEVICE — GUIDEWIRE ORTH L150MM DIA1.25MM S STL THRD FOR 4MM CANN SCR

## (undated) DEVICE — KENDALL RADIOLUCENT FOAM MONITORING ELECTRODE RECTANGULAR SHAPE: Brand: KENDALL

## (undated) DEVICE — 7 DAY SILVER-COATED ANTIMICROBIAL BARRIER DRESSING: Brand: ACTICOAT 7  4" X 5"

## (undated) DEVICE — CANNULA NSL ORAL AD FOR CAPNOFLEX CO2 O2 AIRLFE

## (undated) DEVICE — CONNECTOR TBNG OD5-7MM O2 END DISP

## (undated) DEVICE — INTENDED FOR TISSUE SEPARATION, AND OTHER PROCEDURES THAT REQUIRE A SHARP SURGICAL BLADE TO PUNCTURE OR CUT.: Brand: BARD-PARKER ® STAINLESS STEEL BLADES

## (undated) DEVICE — PAD,ABDOMINAL,5"X9",ST,LF,25/BX: Brand: MEDLINE INDUSTRIES, INC.

## (undated) DEVICE — BIT DRL 230/200MM CALIB DIA2.5MM 3 FLUT QUIK CPL

## (undated) DEVICE — LOWER EXTREMITY: Brand: MEDLINE INDUSTRIES, INC.

## (undated) DEVICE — GLOVE SURG SZ 7 L12IN FNGR THK79MIL GRN LTX FREE